# Patient Record
Sex: MALE | Race: WHITE | NOT HISPANIC OR LATINO | URBAN - METROPOLITAN AREA
[De-identification: names, ages, dates, MRNs, and addresses within clinical notes are randomized per-mention and may not be internally consistent; named-entity substitution may affect disease eponyms.]

---

## 2021-12-06 ENCOUNTER — EMERGENCY (EMERGENCY)
Facility: HOSPITAL | Age: 66
LOS: 1 days | Discharge: HOME | End: 2021-12-06
Attending: EMERGENCY MEDICINE
Payer: COMMERCIAL

## 2021-12-06 VITALS
OXYGEN SATURATION: 95 % | HEART RATE: 98 BPM | HEIGHT: 74 IN | SYSTOLIC BLOOD PRESSURE: 184 MMHG | RESPIRATION RATE: 16 BRPM | TEMPERATURE: 100 F | WEIGHT: 240.08 LBS | DIASTOLIC BLOOD PRESSURE: 102 MMHG

## 2021-12-06 PROCEDURE — 73140 X-RAY EXAM OF FINGER(S): CPT

## 2021-12-06 PROCEDURE — 12001 RPR S/N/AX/GEN/TRNK 2.5CM/<: CPT

## 2021-12-06 PROCEDURE — 73140 X-RAY EXAM OF FINGER(S): CPT | Mod: 26,LT

## 2021-12-06 PROCEDURE — 99283 EMERGENCY DEPT VISIT LOW MDM: CPT | Mod: 25

## 2021-12-06 NOTE — ED STATDOCS - PATIENT PORTAL LINK FT
You can access the FollowMyHealth Patient Portal offered by [facility] by registering at the following website: [email or web address]. By joining [email or web address]’s FollowMyHealth portal, you will also be able to view your health information using other applications (apps) compatible with our system.

## 2021-12-06 NOTE — ED ADULT TRIAGE NOTE - NS ED NURSE BANDS TYPE
-s/p CABG  -NSTEMI c/b V Fib arrest s/p ROSC after 20mins of CPR.  -Continue Amiodarone  -cont medical management as above Name band;

## 2021-12-06 NOTE — ED STATDOCS - OBJECTIVE STATEMENT
crush injury to left 5th finger resulting in laceration earlier today.  RHD.  last tet <10 yrs.  Denies numbness of fingertip

## 2021-12-20 ENCOUNTER — EMERGENCY (EMERGENCY)
Facility: HOSPITAL | Age: 66
LOS: 1 days | Discharge: HOME | End: 2021-12-20
Attending: STUDENT IN AN ORGANIZED HEALTH CARE EDUCATION/TRAINING PROGRAM
Payer: COMMERCIAL

## 2021-12-20 VITALS
WEIGHT: 240.08 LBS | HEIGHT: 74 IN | TEMPERATURE: 99 F | OXYGEN SATURATION: 97 % | HEART RATE: 85 BPM | RESPIRATION RATE: 18 BRPM | DIASTOLIC BLOOD PRESSURE: 94 MMHG | SYSTOLIC BLOOD PRESSURE: 186 MMHG

## 2021-12-20 PROCEDURE — 99053 MED SERV 10PM-8AM 24 HR FAC: CPT

## 2021-12-20 PROCEDURE — 99281 EMR DPT VST MAYX REQ PHY/QHP: CPT

## 2021-12-20 PROCEDURE — 99282 EMERGENCY DEPT VISIT SF MDM: CPT

## 2021-12-20 NOTE — ED PROVIDER NOTE - PHYSICAL EXAMINATION
Const: Awake, alert and oriented. In no acute distress. Well appearing.  HEENT: NC/AT. Moist mucous membranes.  Eyes: No scleral icterus. EOMI.  Neck:. Soft and supple. Full ROM without pain.  Cardiac: +S1/S2. No murmurs. Peripheral pulses 2+ and symmetric. No LE edema.  Resp: Speaking in full sentences. No evidence of respiratory distress. No wheezes, rales or rhonchi.  Abd: Soft, non-tender, non-distended. Normal bowel sounds in all 4 quadrants. No guarding or rebound.  Back: Spine midline and non-tender. No CVAT.  MSK: FROM in all extremities neurovasculary intact radial pulse 2+ hand  5/5   Skin: Wound noted to left 5th finger 2. 5 cm - 7 sutures in place, C/D/I, no erythema or sign of infection no drainage   Lymph: No cervical lymphadenopathy.  Neuro: Awake, alert & oriented x 3. Moves all extremities symmetrically.

## 2021-12-20 NOTE — ED PROVIDER NOTE - PATIENT PORTAL LINK FT
You can access the FollowMyHealth Patient Portal offered by [facility] by registering at the following website: [email or web address]. By joining [facility]’s FollowMyHealth portal, you will also be able to view your health information using other applications (apps) compatible with our system.

## 2021-12-20 NOTE — ED PROVIDER NOTE - ATTENDING CONTRIBUTION TO CARE
67 yo male presents for suture removal. Wound appears to be healing well. I personally saw the patient with the PA, and completed the key components of the history and physical exam. I then discussed the management plan with the PA.

## 2021-12-20 NOTE — ED PROVIDER NOTE - OBJECTIVE STATEMENT
pt is a 65 y/o male presenting to the ed for suture removal. pt stating had laceration to his left 5th finger - sutured at [facility] on december 6th. pt states wound is healing well, no pus drainage. pt denies fevers. pt denies cp sob abd pain back pain numbness or loss of sensation

## 2024-11-24 ENCOUNTER — INPATIENT (INPATIENT)
Facility: HOSPITAL | Age: 69
LOS: 15 days | Discharge: NURSING, REHAB OR LONG-TERM CARE | DRG: 282 | End: 2024-12-10
Attending: STUDENT IN AN ORGANIZED HEALTH CARE EDUCATION/TRAINING PROGRAM | Admitting: STUDENT IN AN ORGANIZED HEALTH CARE EDUCATION/TRAINING PROGRAM
Payer: COMMERCIAL

## 2024-11-24 VITALS
DIASTOLIC BLOOD PRESSURE: 83 MMHG | SYSTOLIC BLOOD PRESSURE: 137 MMHG | HEART RATE: 73 BPM | OXYGEN SATURATION: 96 % | RESPIRATION RATE: 18 BRPM | WEIGHT: 164.91 LBS | TEMPERATURE: 98 F | HEIGHT: 65 IN

## 2024-11-24 DIAGNOSIS — Z98.890 OTHER SPECIFIED POSTPROCEDURAL STATES: Chronic | ICD-10-CM

## 2024-11-24 DIAGNOSIS — R79.89 OTHER SPECIFIED ABNORMAL FINDINGS OF BLOOD CHEMISTRY: ICD-10-CM

## 2024-11-24 DIAGNOSIS — I21.4 NON-ST ELEVATION (NSTEMI) MYOCARDIAL INFARCTION: ICD-10-CM

## 2024-11-24 LAB
ALBUMIN SERPL ELPH-MCNC: 3.4 G/DL — SIGNIFICANT CHANGE UP (ref 3.3–5.2)
ALP SERPL-CCNC: 86 U/L — SIGNIFICANT CHANGE UP (ref 40–120)
ALT FLD-CCNC: 27 U/L — SIGNIFICANT CHANGE UP
ANION GAP SERPL CALC-SCNC: 11 MMOL/L — SIGNIFICANT CHANGE UP (ref 5–17)
APPEARANCE UR: ABNORMAL
APTT BLD: 28 SEC — SIGNIFICANT CHANGE UP (ref 24.5–35.6)
AST SERPL-CCNC: 26 U/L — SIGNIFICANT CHANGE UP
BACTERIA # UR AUTO: NEGATIVE /HPF — SIGNIFICANT CHANGE UP
BASOPHILS # BLD AUTO: 0.04 K/UL — SIGNIFICANT CHANGE UP (ref 0–0.2)
BASOPHILS NFR BLD AUTO: 0.5 % — SIGNIFICANT CHANGE UP (ref 0–2)
BILIRUB SERPL-MCNC: 0.7 MG/DL — SIGNIFICANT CHANGE UP (ref 0.4–2)
BILIRUB UR-MCNC: NEGATIVE — SIGNIFICANT CHANGE UP
BUN SERPL-MCNC: 17.1 MG/DL — SIGNIFICANT CHANGE UP (ref 8–20)
CALCIUM SERPL-MCNC: 8.9 MG/DL — SIGNIFICANT CHANGE UP (ref 8.4–10.5)
CAST: 2 /LPF — SIGNIFICANT CHANGE UP (ref 0–4)
CHLORIDE SERPL-SCNC: 103 MMOL/L — SIGNIFICANT CHANGE UP (ref 96–108)
CO2 SERPL-SCNC: 26 MMOL/L — SIGNIFICANT CHANGE UP (ref 22–29)
COLOR SPEC: YELLOW — SIGNIFICANT CHANGE UP
CREAT SERPL-MCNC: 0.61 MG/DL — SIGNIFICANT CHANGE UP (ref 0.5–1.3)
DIFF PNL FLD: ABNORMAL
EGFR: 105 ML/MIN/1.73M2 — SIGNIFICANT CHANGE UP
EOSINOPHIL # BLD AUTO: 0.06 K/UL — SIGNIFICANT CHANGE UP (ref 0–0.5)
EOSINOPHIL NFR BLD AUTO: 0.7 % — SIGNIFICANT CHANGE UP (ref 0–6)
GLUCOSE SERPL-MCNC: 121 MG/DL — HIGH (ref 70–99)
GLUCOSE UR QL: NEGATIVE MG/DL — SIGNIFICANT CHANGE UP
HCT VFR BLD CALC: 36 % — LOW (ref 39–50)
HGB BLD-MCNC: 12.1 G/DL — LOW (ref 13–17)
IMM GRANULOCYTES NFR BLD AUTO: 0.6 % — SIGNIFICANT CHANGE UP (ref 0–0.9)
INR BLD: 1.07 RATIO — SIGNIFICANT CHANGE UP (ref 0.85–1.16)
KETONES UR-MCNC: NEGATIVE MG/DL — SIGNIFICANT CHANGE UP
LEUKOCYTE ESTERASE UR-ACNC: NEGATIVE — SIGNIFICANT CHANGE UP
LYMPHOCYTES # BLD AUTO: 1.3 K/UL — SIGNIFICANT CHANGE UP (ref 1–3.3)
LYMPHOCYTES # BLD AUTO: 14.7 % — SIGNIFICANT CHANGE UP (ref 13–44)
MCHC RBC-ENTMCNC: 30 PG — SIGNIFICANT CHANGE UP (ref 27–34)
MCHC RBC-ENTMCNC: 33.6 G/DL — SIGNIFICANT CHANGE UP (ref 32–36)
MCV RBC AUTO: 89.3 FL — SIGNIFICANT CHANGE UP (ref 80–100)
MONOCYTES # BLD AUTO: 0.7 K/UL — SIGNIFICANT CHANGE UP (ref 0–0.9)
MONOCYTES NFR BLD AUTO: 7.9 % — SIGNIFICANT CHANGE UP (ref 2–14)
NEUTROPHILS # BLD AUTO: 6.67 K/UL — SIGNIFICANT CHANGE UP (ref 1.8–7.4)
NEUTROPHILS NFR BLD AUTO: 75.6 % — SIGNIFICANT CHANGE UP (ref 43–77)
NITRITE UR-MCNC: NEGATIVE — SIGNIFICANT CHANGE UP
PH UR: 7.5 — SIGNIFICANT CHANGE UP (ref 5–8)
PLATELET # BLD AUTO: 307 K/UL — SIGNIFICANT CHANGE UP (ref 150–400)
POTASSIUM SERPL-MCNC: 3.3 MMOL/L — LOW (ref 3.5–5.3)
POTASSIUM SERPL-SCNC: 3.3 MMOL/L — LOW (ref 3.5–5.3)
PROT SERPL-MCNC: 6.1 G/DL — LOW (ref 6.6–8.7)
PROT UR-MCNC: SIGNIFICANT CHANGE UP MG/DL
PROTHROM AB SERPL-ACNC: 12.1 SEC — SIGNIFICANT CHANGE UP (ref 9.9–13.4)
RBC # BLD: 4.03 M/UL — LOW (ref 4.2–5.8)
RBC # FLD: 13.4 % — SIGNIFICANT CHANGE UP (ref 10.3–14.5)
RBC CASTS # UR COMP ASSIST: 9 /HPF — HIGH (ref 0–4)
SODIUM SERPL-SCNC: 140 MMOL/L — SIGNIFICANT CHANGE UP (ref 135–145)
SP GR SPEC: >1.03 — HIGH (ref 1–1.03)
SQUAMOUS # UR AUTO: 0 /HPF — SIGNIFICANT CHANGE UP (ref 0–5)
TROPONIN T, HIGH SENSITIVITY RESULT: 51 NG/L — SIGNIFICANT CHANGE UP (ref 0–51)
TROPONIN T, HIGH SENSITIVITY RESULT: 53 NG/L — HIGH (ref 0–51)
TROPONIN T, HIGH SENSITIVITY RESULT: 56 NG/L — HIGH (ref 0–51)
UROBILINOGEN FLD QL: 1 MG/DL — SIGNIFICANT CHANGE UP (ref 0.2–1)
WBC # BLD: 8.82 K/UL — SIGNIFICANT CHANGE UP (ref 3.8–10.5)
WBC # FLD AUTO: 8.82 K/UL — SIGNIFICANT CHANGE UP (ref 3.8–10.5)
WBC UR QL: 7 /HPF — HIGH (ref 0–5)

## 2024-11-24 PROCEDURE — 99285 EMERGENCY DEPT VISIT HI MDM: CPT

## 2024-11-24 PROCEDURE — 70486 CT MAXILLOFACIAL W/O DYE: CPT | Mod: 26,MC

## 2024-11-24 PROCEDURE — 70498 CT ANGIOGRAPHY NECK: CPT | Mod: 26,MC

## 2024-11-24 PROCEDURE — 99223 1ST HOSP IP/OBS HIGH 75: CPT

## 2024-11-24 PROCEDURE — 70496 CT ANGIOGRAPHY HEAD: CPT | Mod: 26,MC

## 2024-11-24 PROCEDURE — 70450 CT HEAD/BRAIN W/O DYE: CPT | Mod: 26,59,MC

## 2024-11-24 PROCEDURE — 99283 EMERGENCY DEPT VISIT LOW MDM: CPT

## 2024-11-24 PROCEDURE — 72125 CT NECK SPINE W/O DYE: CPT | Mod: 26,MC

## 2024-11-24 PROCEDURE — 71045 X-RAY EXAM CHEST 1 VIEW: CPT | Mod: 26

## 2024-11-24 RX ORDER — ACETAMINOPHEN 500MG 500 MG/1
650 TABLET, COATED ORAL EVERY 6 HOURS
Refills: 0 | Status: DISCONTINUED | OUTPATIENT
Start: 2024-11-24 | End: 2024-12-10

## 2024-11-24 RX ORDER — POTASSIUM CHLORIDE 600 MG/1
10 TABLET, EXTENDED RELEASE ORAL
Refills: 0 | Status: COMPLETED | OUTPATIENT
Start: 2024-11-24 | End: 2024-11-24

## 2024-11-24 RX ORDER — TETANUS TOXOID, REDUCED DIPHTHERIA TOXOID AND ACELLULAR PERTUSSIS VACCINE, ADSORBED 5; 2.5; 8; 8; 2.5 [IU]/.5ML; [IU]/.5ML; UG/.5ML; UG/.5ML; UG/.5ML
0.5 SUSPENSION INTRAMUSCULAR ONCE
Refills: 0 | Status: COMPLETED | OUTPATIENT
Start: 2024-11-24 | End: 2024-11-24

## 2024-11-24 RX ORDER — OLANZAPINE 20 MG/1
5 TABLET ORAL EVERY 6 HOURS
Refills: 0 | Status: DISCONTINUED | OUTPATIENT
Start: 2024-11-24 | End: 2024-12-10

## 2024-11-24 RX ORDER — OLANZAPINE 20 MG/1
5 TABLET ORAL ONCE
Refills: 0 | Status: COMPLETED | OUTPATIENT
Start: 2024-11-24 | End: 2024-11-24

## 2024-11-24 RX ORDER — MAGNESIUM, ALUMINUM HYDROXIDE 200-225/5
30 SUSPENSION, ORAL (FINAL DOSE FORM) ORAL EVERY 4 HOURS
Refills: 0 | Status: DISCONTINUED | OUTPATIENT
Start: 2024-11-24 | End: 2024-12-10

## 2024-11-24 RX ORDER — KETOROLAC TROMETHAMINE 30 MG/ML
30 INJECTION INTRAMUSCULAR; INTRAVENOUS ONCE
Refills: 0 | Status: DISCONTINUED | OUTPATIENT
Start: 2024-11-24 | End: 2024-11-24

## 2024-11-24 RX ORDER — SODIUM CHLORIDE 9 MG/ML
1000 INJECTION, SOLUTION INTRAMUSCULAR; INTRAVENOUS; SUBCUTANEOUS
Refills: 0 | Status: DISCONTINUED | OUTPATIENT
Start: 2024-11-24 | End: 2024-11-29

## 2024-11-24 RX ORDER — ACETAMINOPHEN, DIPHENHYDRAMINE HCL, PHENYLEPHRINE HCL 325; 25; 5 MG/1; MG/1; MG/1
3 TABLET ORAL AT BEDTIME
Refills: 0 | Status: DISCONTINUED | OUTPATIENT
Start: 2024-11-24 | End: 2024-12-10

## 2024-11-24 RX ORDER — ACETAMINOPHEN 500MG 500 MG/1
1000 TABLET, COATED ORAL ONCE
Refills: 0 | Status: COMPLETED | OUTPATIENT
Start: 2024-11-24 | End: 2024-11-24

## 2024-11-24 RX ORDER — ONDANSETRON HYDROCHLORIDE 4 MG/1
4 TABLET, FILM COATED ORAL EVERY 8 HOURS
Refills: 0 | Status: DISCONTINUED | OUTPATIENT
Start: 2024-11-24 | End: 2024-12-10

## 2024-11-24 RX ADMIN — KETOROLAC TROMETHAMINE 30 MILLIGRAM(S): 30 INJECTION INTRAMUSCULAR; INTRAVENOUS at 15:32

## 2024-11-24 RX ADMIN — OLANZAPINE 5 MILLIGRAM(S): 20 TABLET ORAL at 20:41

## 2024-11-24 RX ADMIN — TETANUS TOXOID, REDUCED DIPHTHERIA TOXOID AND ACELLULAR PERTUSSIS VACCINE, ADSORBED 0.5 MILLILITER(S): 5; 2.5; 8; 8; 2.5 SUSPENSION INTRAMUSCULAR at 15:14

## 2024-11-24 RX ADMIN — KETOROLAC TROMETHAMINE 30 MILLIGRAM(S): 30 INJECTION INTRAMUSCULAR; INTRAVENOUS at 15:45

## 2024-11-24 RX ADMIN — Medication 2 SPRAY(S): at 16:11

## 2024-11-24 RX ADMIN — ACETAMINOPHEN 500MG 1000 MILLIGRAM(S): 500 TABLET, COATED ORAL at 15:43

## 2024-11-24 RX ADMIN — POTASSIUM CHLORIDE 100 MILLIEQUIVALENT(S): 600 TABLET, EXTENDED RELEASE ORAL at 23:18

## 2024-11-24 RX ADMIN — ACETAMINOPHEN 500MG 400 MILLIGRAM(S): 500 TABLET, COATED ORAL at 15:13

## 2024-11-24 RX ADMIN — POTASSIUM CHLORIDE 100 MILLIEQUIVALENT(S): 600 TABLET, EXTENDED RELEASE ORAL at 20:02

## 2024-11-24 RX ADMIN — POTASSIUM CHLORIDE 100 MILLIEQUIVALENT(S): 600 TABLET, EXTENDED RELEASE ORAL at 18:15

## 2024-11-24 RX ADMIN — OLANZAPINE 5 MILLIGRAM(S): 20 TABLET ORAL at 17:50

## 2024-11-24 RX ADMIN — SODIUM CHLORIDE 70 MILLILITER(S): 9 INJECTION, SOLUTION INTRAMUSCULAR; INTRAVENOUS; SUBCUTANEOUS at 18:15

## 2024-11-24 NOTE — H&P ADULT - NSHPLABSRESULTS_GEN_ALL_CORE
12.1   8.82  )-----------( 307      ( 24 Nov 2024 09:18 )             36.0         11-24    140  |  103  |  17.1  ----------------------------<  121[H]  3.3[L]   |  26.0  |  0.61    Ca    8.9      24 Nov 2024 09:18    TPro  6.1[L]  /  Alb  3.4  /  TBili  0.7  /  DBili  x   /  AST  26  /  ALT  27  /  AlkPhos  86  11-24        < from: CT Angio Neck w/ IV Cont (11.24.24 @ 09:39) >    IMPRESSION:  HEAD CT: No evidence of an acute traumatic intracranial injury.  CERVICAL SPINE CT:  No evidence of an acute cervical spine fracture.   Large anterior bridging osteophytes. 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 Left-sided thyroid nodules measuring up to 2.2 cm. Correlate   with nonemergent ultrasonography.  FACIAL CT: Acute nondisplaced bilateral nasal bone fractures. Soft tissue   swelling involves the nose and cartilaginous nasal septum.  NECK CTA: No hemodynamic significant narowing within the neck.  BRAIN CTA: No proximal large vessel occlusion. 2 overlapping stents   within the mid and distal basilar artery which appears to treat a 1.9 x   2.4 x 2.0 cm aneurysm. Tracefilling of the aneurysm towards the upper   aspect of the stent. Correlate with history and prior outside imaging   findings.    --- End of Report ---    < end of copied text >

## 2024-11-24 NOTE — ED PROVIDER NOTE - OBJECTIVE STATEMENT
69-year-old male, history of recent stroke, delirium, significant cerebral dysfunction; presenting for fall and change in mental status.  Patient is nonverbal, but discussed with facility staff at [facility] and stable.  Over the last 24 hours, patient has become increasingly agitated, threw feces at 1 point.  Multiple attempts to get out of wheelchair and bed without proper supervision.  Patient is normally nonverbal and with significant resistance to care at baseline.  However was more agitated last night.  Received a as needed dose of Ativan, but proceeded to fall and strike his head earlier this morning.  Patient arrives to the ED unable to contribute to history.  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  Staff at outpatient facility also note that patient was started on Zosyn 24 hours ago for an unclear leukocytosis.

## 2024-11-24 NOTE — CONSULT NOTE ADULT - TIME BILLING
History, vitals, exam, meds, labs, cardiac images, ECG, and tele were reviewed.  Patient was agreeable and appreciative of the care provided.

## 2024-11-24 NOTE — H&P ADULT - HISTORY OF PRESENT ILLNESS
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  Pt is a poor historian but as per family he has had worsening decine in mentation since stroke and significantly agitated and impulsive the past few days.  He was restless trying to get out of wheelchair yesterday and fell landing on his face/head this morning and was subsequently sent to hospital for evaluation.

## 2024-11-24 NOTE — CONSULT NOTE ADULT - SUBJECTIVE AND OBJECTIVE BOX
[facility]                                              CARDIOLOGY AT [facility]                                                   [address], [place] [address]                                             Telephone: [phone]. Fax:[phone]                                                       CARDIOLOGY CONSULTATION NOTE                                                                                             History obtained by: Patient and medical record  Community Cardiologist:  Ross Germain (doctor unknown)   obtained: Yes [  ] No [x  ]  Reason for Consultation:    Elevated trops  Available out pt records reviewed: Yes [x  ] No [  ]    Chief complaint:    Patient is a 69y old  Male who presents with a chief complaint of s/p fall    HPI:  69-yo, M/  PMH:  Hypertension, R eye implant (not MR compatible), recent hospital admission at [facility] (6/14-7/17/24) for stroke, 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    Patient is nonverbal, but discussed that over the last 24 hours, patient has become increasingly agitated, threw feces at 1 point.  Multiple attempts to get out of wheelchair and bed without proper supervision.    Patient is normally nonverbal and with significant resistance to care at baseline.    However was more agitated last night.  Received a as needed dose of Ativan, but proceeded to fall and strike his head earlier this morning. .    A cardiac consult was placed due to elevated troponins.    Patient complaining of jaw pain.  EKG has TWI in leads v2 - v6.    Denies dizziness palpitations, sob, pnd, orthopnea, n/v/d/c/abd pain or any other pain.        CARDIAC TESTING   ECHO:  7/21 Echo - EF 75%, normal LVSF/RVSF, mild MR, trace TR  STRESS:    CATH:     ELECTROPHYSIOLOGY:     PAST MEDICAL HISTORY      PAST SURGICAL HISTORY      SOCIAL HISTORY:  Denies smoking/alcohol/drugs  CIGARETTES:     ALCOHOL:  DRUGS:    FAMILY HISTORY:    Family History of Cardiovascular Disease:  Yes [  ] No [  ]  Coronary Artery Disease in first degree relative: Yes [  ] No [  ]  Sudden Cardiac Death in First degree relative: Yes [  ] No [  ]    CURRENT OTHER MEDICATIONS:  acetaminophen   IVPB .. 1000 milliGRAM(s) IV Intermittent Once, Stop order after: 1 Doses  ketorolac   Injectable 30 milliGRAM(s) IV Push Once, Stop order after: 1 Doses  OLANZapine Injectable 5 milliGRAM(s) IntraMuscular Once, Stop order after: 1 Doses  diphtheria/tetanus/pertussis (acellular) Vaccine (Adacel) 0.5 milliLiter(s) IntraMuscular once, Stop order after: 1 Doses  oxymetazoline 0.05% Nasal Spray 2 Spray(s) Alternating Nostrils Once, Stop order after: 1 Doses    ALLERGIES:   No Known Allergies    REVIEW OF SYMPTOMS:   CONSTITUTIONAL: No fever, no chills, no weight loss, no weight gain, no fatigue   ENMT:  No vertigo; No sinus or throat pain  NECK: No pain or stiffness  CARDIOVASCULAR: No chest pain, no dyspnea, no syncope/presyncope, no palpitations, no dizziness, no Orthopnea, no Paroxsymal nocturnal dyspnea  RESPIRATORY: no Shortness of breath, no cough, no wheezing  : No dysuria, no hematuria   GI: No dark color stool, no nausea, no diarrhea, no constipation, no abdominal pain   NEURO: No headache, no slurred speech   MUSCULOSKELETAL: No joint pain or swelling; No muscle, back, or extremity pain  PSYCH: No agitation, no anxiety.    ALL OTHER REVIEW OF SYSTEMS ARE NEGATIVE.    VITAL SIGNS:  T(C): 36.6 (11-24-24 @ 09:06), Max: 36.6 (11-24-24 @ 09:06)  T(F): 97.8 (11-24-24 @ 09:06), Max: 97.8 (11-24-24 @ 09:06)  HR: 73 (11-24-24 @ 09:06) (73 - 73)  BP: 137/83 (11-24-24 @ 09:06) (137/83 - 137/83)  RR: 18 (11-24-24 @ 09:06) (18 - 18)  SpO2: 96% (11-24-24 @ 09:06) (96% - 96%)    INTAKE AND OUTPUT:  PHYSICAL EXAM:  Constitutional: Comfortable. No acute distress.   Makes needs known with nodding head.    HEENT: Facial bruising and normocephalic , neck is supple . no JVD. No carotid bruit.  CNS:   Awake and follows commands, nonverbal, paralyzed on the right.    Respiratory: CTAB, unlabored   Cardiovascular: RRR normal s1 s2. No murmur. No rubs or gallop.  Gastrointestinal: Soft, non-tender. +Bowel sounds.   Extremities:+ Peripheral Pulses, No clubbing, cyanosis, or edema  Psychiatric: Calm . no agitation.   Skin: Warm and dry, no ulcers on extremities     LABS:                         12.1   8.82  )-----------( 307      ( 24 Nov 2024 09:18 )             36.0     11-24    140  |  103  |  17.1  ----------------------------<  121[H]  3.3[L]   |  26.0  |  0.61    Ca    8.9      24 Nov 2024 09:18    TPro  6.1[L]  /  Alb  3.4  /  TBili  0.7  /  DBili  x   /  AST  26  /  ALT  27  /  AlkPhos  86  11-24    PT/INR - ( 24 Nov 2024 09:18 )   PT: 12.1 sec;   INR: 1.07 ratio         PTT - ( 24 Nov 2024 09:18 )  PTT:28.0 sec  Urinalysis Basic - ( 24 Nov 2024 09:18 )    Color: x / Appearance: x / SG: x / pH: x  Gluc: 121 mg/dL / Ketone: x  / Bili: x / Urobili: x   Blood: x / Protein: x / Nitrite: x   Leuk Esterase: x / RBC: x / WBC x   Sq Epi: x / Non Sq Epi: x / Bacteria: x    INTERPRETATION OF TELEMETRY:   Sr/SB TWI v2-v5    ECG:   Ventricular Rate 57 BPM  Atrial Rate 57 BPM  P-R Interval 182 ms  QRS Duration 100 ms  Q-T Interval 448 ms  QTC Calculation(Bazett) 436 ms  P Axis 55 degrees  R Axs 25 degrees  T Axis 120 degrees  Diagnosis Line Sinus bradycardia with marked sinus arrhythmia  ST & T wave abnormality, consider anterolateral ischemia  Abnormal ECG    Prior ECG: Yes [  ] No [x  ]                                                  [facility]                                              CARDIOLOGY AT [facility]                                                   [address], [place] [address]                                             Telephone: [phone]. Fax:[phone]                                                       CARDIOLOGY CONSULTATION NOTE                                                                                             History obtained by: Patient and medical record  Community Cardiologist:  Ross Germain (doctor unknown)   obtained: Yes [  ] No [x  ]  Reason for Consultation:    Elevated trops  Available out pt records reviewed: Yes [x  ] No [  ]    Chief complaint:    Patient is a 69y old  Male who presents with a chief complaint of s/p fall    HPI:  69-yo, M/  PMH:  Hypertension, R eye implant (not MR compatible), recent hospital admission at [facility] (6/14-7/17/24) for stroke, 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    Patient is nonverbal, but discussed that over the last 24 hours, patient has become increasingly agitated, threw feces at 1 point.  Multiple attempts to get out of wheelchair and bed without proper supervision.    Patient is normally nonverbal and with significant resistance to care at baseline.    However was more agitated last night.  Received a as needed dose of Ativan, but proceeded to fall and strike his head earlier this morning. .    A cardiac consult was placed due to elevated troponins.    Patient complaining of jaw pain.  EKG has TWI in leads v2 - v6.    Denies dizziness palpitations, sob, pnd, orthopnea, n/v/d/c/abd pain or any other pain.      CARDIAC TESTING   ECHO:  7/21 Echo - EF 75%, normal LVSF/RVSF, mild MR, trace TR  STRESS:    CATH:     ELECTROPHYSIOLOGY:     PAST MEDICAL HISTORY      PAST SURGICAL HISTORY      SOCIAL HISTORY:  Denies smoking/alcohol/drugs  CIGARETTES:     ALCOHOL:  DRUGS:    FAMILY HISTORY:    Family History of Cardiovascular Disease:  Yes [  ] No [  ]  Coronary Artery Disease in first degree relative: Yes [  ] No [  ]  Sudden Cardiac Death in First degree relative: Yes [  ] No [  ]    CURRENT OTHER MEDICATIONS:  acetaminophen   IVPB .. 1000 milliGRAM(s) IV Intermittent Once, Stop order after: 1 Doses  ketorolac   Injectable 30 milliGRAM(s) IV Push Once, Stop order after: 1 Doses  OLANZapine Injectable 5 milliGRAM(s) IntraMuscular Once, Stop order after: 1 Doses  diphtheria/tetanus/pertussis (acellular) Vaccine (Adacel) 0.5 milliLiter(s) IntraMuscular once, Stop order after: 1 Doses  oxymetazoline 0.05% Nasal Spray 2 Spray(s) Alternating Nostrils Once, Stop order after: 1 Doses    ALLERGIES:   No Known Allergies    REVIEW OF SYMPTOMS:   CONSTITUTIONAL: No fever, no chills, no weight loss, no weight gain, no fatigue   ENMT:  No vertigo; No sinus or throat pain  NECK: No pain or stiffness  CARDIOVASCULAR: No chest pain, no dyspnea, no syncope/presyncope, no palpitations, no dizziness, no Orthopnea, no Paroxsymal nocturnal dyspnea  RESPIRATORY: no Shortness of breath, no cough, no wheezing  : No dysuria, no hematuria   GI: No dark color stool, no nausea, no diarrhea, no constipation, no abdominal pain   NEURO: No headache, no slurred speech   MUSCULOSKELETAL: No joint pain or swelling; No muscle, back, or extremity pain  PSYCH: No agitation, no anxiety.    ALL OTHER REVIEW OF SYSTEMS ARE NEGATIVE.    VITAL SIGNS:  T(C): 36.6 (11-24-24 @ 09:06), Max: 36.6 (11-24-24 @ 09:06)  T(F): 97.8 (11-24-24 @ 09:06), Max: 97.8 (11-24-24 @ 09:06)  HR: 73 (11-24-24 @ 09:06) (73 - 73)  BP: 137/83 (11-24-24 @ 09:06) (137/83 - 137/83)  RR: 18 (11-24-24 @ 09:06) (18 - 18)  SpO2: 96% (11-24-24 @ 09:06) (96% - 96%)    INTAKE AND OUTPUT:  PHYSICAL EXAM:  Constitutional: Comfortable. No acute distress.   Makes needs known with nodding head.    HEENT: Facial bruising and normocephalic , neck is supple . no JVD. No carotid bruit.  CNS:   Awake and follows commands, nonverbal, paralyzed on the right.    Respiratory: CTAB, unlabored   Cardiovascular: RRR normal s1 s2. Grade II/VI systolic murmur. No rubs or gallop.  Gastrointestinal: Soft, non-tender. +Bowel sounds.   Extremities:+ Peripheral Pulses, No clubbing, cyanosis, or edema  Psychiatric: Calm . no agitation.   Skin: Warm and dry, no ulcers on extremities     LABS:                         12.1   8.82  )-----------( 307      ( 24 Nov 2024 09:18 )             36.0     11-24    140  |  103  |  17.1  ----------------------------<  121[H]  3.3[L]   |  26.0  |  0.61    Ca    8.9      24 Nov 2024 09:18    TPro  6.1[L]  /  Alb  3.4  /  TBili  0.7  /  DBili  x   /  AST  26  /  ALT  27  /  AlkPhos  86  11-24    PT/INR - ( 24 Nov 2024 09:18 )   PT: 12.1 sec;   INR: 1.07 ratio         PTT - ( 24 Nov 2024 09:18 )  PTT:28.0 sec  Urinalysis Basic - ( 24 Nov 2024 09:18 )    Color: x / Appearance: x / SG: x / pH: x  Gluc: 121 mg/dL / Ketone: x  / Bili: x / Urobili: x   Blood: x / Protein: x / Nitrite: x   Leuk Esterase: x / RBC: x / WBC x   Sq Epi: x / Non Sq Epi: x / Bacteria: x    INTERPRETATION OF TELEMETRY:   Sr/SB TWI v2-v5    ECG:   Ventricular Rate 57 BPM  Atrial Rate 57 BPM  P-R Interval 182 ms  QRS Duration 100 ms  Q-T Interval 448 ms  QTC Calculation(Bazett) 436 ms  P Axis 55 degrees  R Axs 25 degrees  T Axis 120 degrees  Diagnosis Line Sinus bradycardia with marked sinus arrhythmia  ST & T wave abnormality, consider anterolateral ischemia  Abnormal ECG    Prior ECG: Yes [  ] No [x  ]

## 2024-11-24 NOTE — ED ADULT NURSE NOTE - NSFALLRISKINTERV_ED_ALL_ED
Assistance OOB with selected safe patient handling equipment if applicable/Assistance with ambulation/Communicate fall risk and risk factors to all staff, patient, and family/Monitor gait and stability/Monitor for mental status changes and reorient to person, place, and time, as needed/Move patient closer to nursing station/within visual sight of ED staff/Provide visual cue: yellow wristband, yellow gown, etc/Reinforce activity limits and safety measures with patient and family/Toileting schedule using arm’s reach rule for commode and bathroom/Use of alarms - bed, stretcher, chair and/or video monitoring/Call bell, personal items and telephone in reach/Instruct patient to call for assistance before getting out of bed/chair/stretcher/Non-slip footwear applied when patient is off stretcher/[place] to call system/Physically safe environment - no spills, clutter or unnecessary equipment/Purposeful Proactive Rounding/Room/bathroom lighting operational, light cord in reach

## 2024-11-24 NOTE — ED ADULT NURSE REASSESSMENT NOTE - NS ED NURSE REASSESS COMMENT FT1
Assumed care of pt at this time. Patient arousable to stimulation, no acute distress, resp nonlabored, resting comfortably in bed.  C collar removed by MD Barriga. Pt remains on continuos cardiac monitoring NSR HR 68,  97% RA. Pt refusing to answer RN questions at this time. Safety maintained with bed locked and in lowest position.

## 2024-11-24 NOTE — PATIENT PROFILE ADULT - FUNCTIONAL ASSESSMENT - BASIC MOBILITY 6.
1-calculated by average/Not able to assess (calculate score using [facility] averaging method)

## 2024-11-24 NOTE — ED PROVIDER NOTE - SECONDARY DIAGNOSIS.
Primary Care - Behavioral Health Intake     Vania Boggs MD as PCP - General (Family Practice)    Referral Information:   Patient referred to [facility] by her primary care physician, Vania Boggs MD, on October 31, 2023 for concerns related to anxious mood, concentration issues, excessive over-eating/snacking, grief, motivation to things that would normally do, socialization and work/work schedule.    Contacted patient via phone to discuss the integration of behavioral health services into the primary care clinic and the provision of care coordination and/or short-term, brief interventions to improve overall health and functioning.     BHI program was explained and patient agreed.  Reason for decline: N/A    Current Outpatient Therapist/Psychiatry:  No    Behavioral Health Medication Review:  Current: Yes  History:  Yes  Side effects:  none at this time    Assessment:  Patient was normal and alert. Mood was normal and affect appropriate. Thought process appeared Appropriate and Normal.     PHQ 9 Scores Last four PHQ 2/9 Test Results  Last four PHQ 2/9 Test Results  0: Not at all  1: Several days  2: More than half the days  3: Nearly every day          11/1/2023     9:58 AM 10/30/2023     3:42 PM 8/17/2023     3:43 PM 6/5/2023     8:36 AM   PHQ 2 Score   Adult PHQ 2 Score 1 0 0 0   Adult PHQ 2 Interpretation No further screening needed No further screening needed No further screening needed No further screening needed   Little interest or pleasure in activity? 1 0 0 0         11/1/2023     9:58 AM 4/4/2022     2:34 PM 2/2/2021     9:48 PM   PHQ 9 Score   Adult PHQ 9 Score 8 0 0   Adult PHQ 9 Interpretation Mild Depression               CARLOTA 7 Scores Last four GAD7 Assessments           11/1/2023     1:00 PM 11/1/2023    12:00 PM 10/30/2023     3:30 PM 8/17/2023     3:30 PM   GAD7 Screening   GAD7 Score  15 16 10   Feeling nervous, anxious or on edge  Nearly every day Nearly every day Not at all   Not being able to stop  or control worrying  Nearly every day Nearly every day More than half the days   Worrying too much about different things  Nearly every day Nearly every day More than half the days   Trouble relaxing  More than half the days Nearly every day More than half the days   Being so restless that it's hard to sit still  Several days Several days More than half the days   Becoming easily annoyed or irritable  More than half the days Nearly every day More than half the days   Feeling afraid as if something awful might happen  Several days Not at all Not at all   Ability to handle work, home and other people  Very difficult Very difficult Very difficult   Date/time completed by patient via [email or web address] 11/1/2023 12:07 PM 11/1/2023  9:57 AM    11/1/2023  9:57 AM    11/1/2023  9:58 AM         SDOH Screen completed in Epic No    Resources given: Yes, crisis    Patient Goal (one thing patient would change):  Patient would like to gain tools to increase concentration. Gain coping skills, gain confidence and tools to manage anxiety.     Recommendations:   Patient was referred to the Integrated Behavioral Health Clinician, Johnathon Augustine LPC, for evaluation and treatment recommendations    Provided patient with contact information for this writer as well as the [facility] emergency after hours contact number [phone]. Patient was encouraged to contact Behavioral Health Integration team with any questions or concerns. Will continue collaboration with patient's primary care provider, Vania Boggs MD, regarding patient's care and treatment plan.   Nasal bone fracture Antibiotics received only prior to arrival

## 2024-11-24 NOTE — ED ADULT NURSE REASSESSMENT NOTE - NS ED NURSE REASSESS COMMENT FT1
Pt desat to 75%. MD Barriga made aware and at bedside. Pt placed on nonrebreather and O2 sat back up to 98%.

## 2024-11-24 NOTE — PATIENT PROFILE ADULT - FALL HARM RISK - HARM RISK INTERVENTIONS
Assistance with ambulation/Assistance OOB with selected safe patient handling equipment/Communicate Risk of Fall with Harm to all staff/Discuss with provider need for PT consult/Monitor for mental status changes/Monitor gait and stability/Move patient closer to nurses' station/Provide patient with walking aids - walker, cane, crutches/Reinforce activity limits and safety measures with patient and family/Reorient to person, place and time as needed/Tailored Fall Risk Interventions/Toileting schedule using arm’s reach rule for commode and bathroom/Use of alarms - bed, chair and/or voice tab/Visual Cue: Yellow wristband and red socks/Bed in lowest position, wheels locked, appropriate side rails in place/Call bell, personal items and telephone in reach/Instruct patient to call for assistance before getting out of bed or chair/Non-slip footwear when patient is out of bed/[place] to call system/Physically safe environment - no spills, clutter or unnecessary equipment/Purposeful Proactive Rounding/Room/bathroom lighting operational, light cord in reach

## 2024-11-24 NOTE — ED PROVIDER NOTE - CARE PLAN
Principal Discharge DX:	NSTEMI (non-ST elevation myocardial infarction)  Secondary Diagnosis:	Nasal bone fracture   1 Principal Discharge DX:	NSTEMI (non-ST elevation myocardial infarction)  Secondary Diagnosis:	Nasal bone fracture  Secondary Diagnosis:	Antibiotics received only prior to arrival

## 2024-11-24 NOTE — ED ADULT NURSE REASSESSMENT NOTE - NS ED NURSE REASSESS COMMENT FT1
Pt HR 51. Patient placed on 2L nasal cannula and EKG currently in progress. MD Barriga made aware and no further orders at this time.

## 2024-11-24 NOTE — PATIENT PROFILE ADULT - DO YOU EVER NEED HELP READING HOSPITAL MATERIALS?
Patient ID:   Connor Morgan no

## 2024-11-24 NOTE — H&P ADULT - ASSESSMENT
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  Pt reported by family to have continued decline in mentation since stroke but significantly agitated the past few days.  VS stable overall but had 1 short episode of desaturation reportedly in to 70's but quickly resolved and currenly satting >94% ORA.  Clinically appears dehydrated, is altered, restless and only intermittently follows simple commands but not answering questions appropriately.  Initial w/u significant for Hb 12.1, trops 56-->53, EKG w/ TWI in V2-V6.  CT maxillofacial significant for acute nondisplaced bilateral nasal bone fractures.  CTH and Cspine negative.  CTA neck and brain negative for acute findings.  s/p zyprexa for restlessness/agitaiton in ED.  Cardiology consulted.  Will admit for type II MI and mechanical fall.       Type II MI, possibley demand ischemia   - Trops 56-->53  - EKG has TWIs in V2 - V6  - Will order repeat EKG to assess for dynamic changes  - TTE ordered to assess LVfxn, valves and WMA  - Check lipid panel and A1c   - Per  first pt is on ASA and brillinta likely from recent intracranial stent placement but on hold awaiting repeat CTH is done to ensure no ICH and pending SLP clearance for diet   - Monitor on telemetry   - Cardiology consulted       Mechanical fall w/ subsequent nasal bone fractures  - Pt reported to be agitated at facility and fell out of bed   - CTH and C-spine negative   - CTA neck w/ no hemodynamic significant narrowing within the neck  - CTA brain w/ no large vessel occlusion but noted to have 2 overlapping stents within the mid and distal basilar artery which appears to treat a 1.9 x 2.4 x 2.0 cm aneurysm.  Trace filling of the aneurysm towards the upper aspect of the stent noted.   - CT maxillofacial significant for acute nondisplaced bilateral nasal bone fractures   - Family reports pt is on blood thinners therefore will order 2nd CTH to ensure no ICH   - Out pt follow up w/ oral facial maxially surgery but no acute interventions required   - Analgesics as needed   - Fall and aspiration precautions       Worsening mentation w/ behavioral disturbances  - Suspect part of decline in mental status related to recent stroke/aneurysm repair  - Per family pts mental status has been declining over the past few weeks but significantly worse recently   - No evidence of acute infection and UA negative for UTI   - Pt s/p fall w/ head trauma and initial CTH negative for ICH   - Stat repeat CTH ordered as pt is on blood thinners and requires 2 CTHs at least 6hrs appart to ensure no ICH   - CXR w/out consolidation/infiltrate   - Dehydrated on exam which can contribute to delirium therefore will give gentle IVFs   - Concern for dysphagia given mental status  - Will make NPO, place on IVFs and consult SLP for evaluation   - Once cleared by speech for diet resume home meds  - Fall and aspiration precautions   - PRN IM zyprexa if needed for severe agitation but reorient when possible and can consider starting depakote IV as well if remains very agitated   - Would avoid benzo's if possible   - Given concern for impulsivity and fall risk will place on 1:1 for now but would benefit from camera room   - Once cleared for diet resume home sertraline  -  consulted for further recs       Hypokalemia  - Will supplement  - Maintain K>4      Mild normocytic anemia  - Baseline H/H unknown   - Reported to be on 3 blood thinners as per family but awaiting med rec from [facility] to be fax'ed over to confirm (per  first pt is on ASA and brillinta)  - Dry blood in and around nares noted   - Hemodynamically stable   - Will repeat CBC in AM to reassess  - Transfuse for Hb<8      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  Trace filling of the aneurysm towards the upper aspect of the stent noted.   - Can not have MRI as per family bc pt has Rt eye implant that is not MRI compatible  - CXR negative  - As per family pt is on 3 blood thinners but do not know which ones or doses (per Dr. balbuena pt is on ASA and brillinta)  [facility]         VTE ppx:  SCDs pending repeat CTH to ensure no ICH.      Dispo: Acute.  Anticipate will need MAURICIO on d/c and will need PT consult but awaiting repeat CTH prior to ordering consult.

## 2024-11-24 NOTE — CONSULT NOTE ADULT - NS ATTEND AMEND GEN_ALL_CORE FT
68 yo, M PMH basilar artery aneurysm with angio and thrombolysis at [facility] in June, had a second stroke also treated at [facility], htn, right hemiparesis, presents today for fall out of wheel chair and broke his nose.  ?Severe AS    Outside records 7/21 Echo LVEF 75%, normal LVSF/RVSF, mild MR, trace TR. [facility] 9/2022, mild to moderate nonobstructive CAD 50% ostial circumflex stenosis, 45% pLAD, serve AS, mildly elevated capillary wedge pressure.     A cardiac consult placed due to elevated troponin levels 53,58    -Ct trend trops.  -Repeat TTE to assess aortic valve and LVEF. We will follow up on the results.  -Ct tele.

## 2024-11-24 NOTE — CONSULT NOTE ADULT - ASSESSMENT
69-yo, M/  PMH:  Hypertension, R eye implant (not MR compatible), recent hospital admission at [facility] (6/14-7/17/24) for stroke, 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    Patient is nonverbal, but discussed that over the last 24 hours, patient has become increasingly agitated, threw feces at 1 point.  Multiple attempts to get out of wheelchair and bed without proper supervision.    Patient is normally nonverbal and with significant resistance to care at baseline.    However was more agitated last night.  Received a as needed dose of Ativan, but proceeded to fall and strike his head earlier this morning. .    A cardiac consult was placed due to elevated troponins.    Patient complaining of jaw pain.  EKG has TWI in leads v2 - v6.    Denies dizziness palpitations, sob, pnd, orthopnea, n/v/d/c/abd pain or any other pain.

## 2024-11-24 NOTE — ED ADULT NURSE NOTE - OBJECTIVE STATEMENT
Pt A&Ox4 presenting to the ED s/p fall at nursing home. PMH stroke in June, brain aneurysm, HTN. Patient is nonverbal at baseline. As per family, pt has gotten increasingly more agitated and confused over the past few days. As per family, pt was sitting in a chair and slid out at [facility]. Pt +use of blood thinners and +head strike. Patient remains on continuos cardiac monitoring NSR HR 70,  97% RA.

## 2024-11-24 NOTE — CONSULT NOTE ADULT - PROBLEM SELECTOR RECOMMENDATION 9
68 yo, M PMH basilar artery aneurysm with angio and thrombolysis at [facility] in June, had a second stroke also treated at [facility], htn, right hemiparesis, presents today for fall out of wheel chair and broke his nose.      A cardiac consult placed due to elevated troponins 53,58  Ct trend trops  7/21 Echo - EF 75%, normal LVSF/RVSF, mild MR, trace TR  [facility] September 2022, mild to moderate nonobstructive CAD 50% ostial circumflex stenosis, 45% p LAD,  AS, mildly elevated capillary wedge pressure.  Repeat TTE  Ct telemetry and pulse monitoring.

## 2024-11-24 NOTE — ED ADULT TRIAGE NOTE - CHIEF COMPLAINT QUOTE
pt BIBA from NH s/p fall. EMS reports + head stirke, no LOC. + Ac use. pt alerted in ED, lac noted to nose, bleeding controlled, MD Linus at bedside for eval, priority CT

## 2024-11-24 NOTE — ED PROVIDER NOTE - ENMT, MLM
right eye chronic pupilary deformity; tenderness and swelling to nasal bridge with abrasion, but no charlene laceration; dried blood in nares, but no septal hematoma

## 2024-11-24 NOTE — H&P ADULT - NSHPPHYSICALEXAM_GEN_ALL_CORE
GENERAL: pt examined bedside, restless and uncomfortable appearing  HEENT: NC/AT, dry oral mucosa, clear conjunctiva, sclera nonicteric  RESPIRATORY: Normal respiratory effort, no wheezing, rhonchi, rales  CARDIOVASCULAR: RRR, normal S1 and S2, no murmur/rub/gallop  ABDOMEN: soft, NT/ND, + bowel sounds, no rebound/guarding  MSK: No joint deformities, edema, erythema  EXTREMITIES: No cynaosis, no clubbing, no lower extremity edema  PSYCH: agitated, restless, impulsive, intermittently cooperative but overall not following commands well   NEUROLOGY: Awake but unable to assess if oriented, pt nonverbal, Rt sided paralysis, moving LUE and LLE spontaneously, strength difficult to assess as only intermittently following simple commands  SKIN: poor turgor

## 2024-11-24 NOTE — ED PROVIDER NOTE - CLINICAL SUMMARY MEDICAL DECISION MAKING FREE TEXT BOX
elevated troponin; admit for tele, serial trops, echo;    consult for ongoing agitation in setting of chronic brain injury  d/w hospitalist for admission  pending straight cath for urine.

## 2024-11-25 ENCOUNTER — RESULT REVIEW (OUTPATIENT)
Age: 69
End: 2024-11-25

## 2024-11-25 LAB
A1C WITH ESTIMATED AVERAGE GLUCOSE RESULT: 5.9 % — HIGH (ref 4–5.6)
ALBUMIN SERPL ELPH-MCNC: 3.4 G/DL — SIGNIFICANT CHANGE UP (ref 3.3–5.2)
ALP SERPL-CCNC: 91 U/L — SIGNIFICANT CHANGE UP (ref 40–120)
ALT FLD-CCNC: 23 U/L — SIGNIFICANT CHANGE UP
ANION GAP SERPL CALC-SCNC: 12 MMOL/L — SIGNIFICANT CHANGE UP (ref 5–17)
AST SERPL-CCNC: 19 U/L — SIGNIFICANT CHANGE UP
BASOPHILS # BLD AUTO: 0.05 K/UL — SIGNIFICANT CHANGE UP (ref 0–0.2)
BASOPHILS NFR BLD AUTO: 0.5 % — SIGNIFICANT CHANGE UP (ref 0–2)
BILIRUB SERPL-MCNC: 0.8 MG/DL — SIGNIFICANT CHANGE UP (ref 0.4–2)
BUN SERPL-MCNC: 12.5 MG/DL — SIGNIFICANT CHANGE UP (ref 8–20)
CALCIUM SERPL-MCNC: 9.1 MG/DL — SIGNIFICANT CHANGE UP (ref 8.4–10.5)
CHLORIDE SERPL-SCNC: 106 MMOL/L — SIGNIFICANT CHANGE UP (ref 96–108)
CHOLEST SERPL-MCNC: 88 MG/DL — SIGNIFICANT CHANGE UP
CO2 SERPL-SCNC: 23 MMOL/L — SIGNIFICANT CHANGE UP (ref 22–29)
CREAT SERPL-MCNC: 0.45 MG/DL — LOW (ref 0.5–1.3)
EGFR: 115 ML/MIN/1.73M2 — SIGNIFICANT CHANGE UP
EOSINOPHIL # BLD AUTO: 0.07 K/UL — SIGNIFICANT CHANGE UP (ref 0–0.5)
EOSINOPHIL NFR BLD AUTO: 0.7 % — SIGNIFICANT CHANGE UP (ref 0–6)
ESTIMATED AVERAGE GLUCOSE: 123 MG/DL — HIGH (ref 68–114)
GLUCOSE SERPL-MCNC: 80 MG/DL — SIGNIFICANT CHANGE UP (ref 70–99)
HCT VFR BLD CALC: 37.8 % — LOW (ref 39–50)
HDLC SERPL-MCNC: 34 MG/DL — LOW
HGB BLD-MCNC: 12.3 G/DL — LOW (ref 13–17)
IMM GRANULOCYTES NFR BLD AUTO: 0.3 % — SIGNIFICANT CHANGE UP (ref 0–0.9)
LIPID PNL WITH DIRECT LDL SERPL: 35 MG/DL — SIGNIFICANT CHANGE UP
LYMPHOCYTES # BLD AUTO: 1.7 K/UL — SIGNIFICANT CHANGE UP (ref 1–3.3)
LYMPHOCYTES # BLD AUTO: 17.8 % — SIGNIFICANT CHANGE UP (ref 13–44)
MAGNESIUM SERPL-MCNC: 2.1 MG/DL — SIGNIFICANT CHANGE UP (ref 1.6–2.6)
MCHC RBC-ENTMCNC: 30.2 PG — SIGNIFICANT CHANGE UP (ref 27–34)
MCHC RBC-ENTMCNC: 32.5 G/DL — SIGNIFICANT CHANGE UP (ref 32–36)
MCV RBC AUTO: 92.9 FL — SIGNIFICANT CHANGE UP (ref 80–100)
MONOCYTES # BLD AUTO: 0.78 K/UL — SIGNIFICANT CHANGE UP (ref 0–0.9)
MONOCYTES NFR BLD AUTO: 8.2 % — SIGNIFICANT CHANGE UP (ref 2–14)
NEUTROPHILS # BLD AUTO: 6.91 K/UL — SIGNIFICANT CHANGE UP (ref 1.8–7.4)
NEUTROPHILS NFR BLD AUTO: 72.5 % — SIGNIFICANT CHANGE UP (ref 43–77)
NON HDL CHOLESTEROL: 54 MG/DL — SIGNIFICANT CHANGE UP
PHOSPHATE SERPL-MCNC: 3 MG/DL — SIGNIFICANT CHANGE UP (ref 2.4–4.7)
PLATELET # BLD AUTO: 301 K/UL — SIGNIFICANT CHANGE UP (ref 150–400)
POTASSIUM SERPL-MCNC: 3.5 MMOL/L — SIGNIFICANT CHANGE UP (ref 3.5–5.3)
POTASSIUM SERPL-SCNC: 3.5 MMOL/L — SIGNIFICANT CHANGE UP (ref 3.5–5.3)
PROT SERPL-MCNC: 6.2 G/DL — LOW (ref 6.6–8.7)
RBC # BLD: 4.07 M/UL — LOW (ref 4.2–5.8)
RBC # FLD: 13.2 % — SIGNIFICANT CHANGE UP (ref 10.3–14.5)
SODIUM SERPL-SCNC: 141 MMOL/L — SIGNIFICANT CHANGE UP (ref 135–145)
TRIGL SERPL-MCNC: 93 MG/DL — SIGNIFICANT CHANGE UP
WBC # BLD: 9.54 K/UL — SIGNIFICANT CHANGE UP (ref 3.8–10.5)
WBC # FLD AUTO: 9.54 K/UL — SIGNIFICANT CHANGE UP (ref 3.8–10.5)

## 2024-11-25 PROCEDURE — 93306 TTE W/DOPPLER COMPLETE: CPT | Mod: 26

## 2024-11-25 PROCEDURE — 99232 SBSQ HOSP IP/OBS MODERATE 35: CPT | Mod: 25

## 2024-11-25 PROCEDURE — 70450 CT HEAD/BRAIN W/O DYE: CPT | Mod: 26

## 2024-11-25 PROCEDURE — 99233 SBSQ HOSP IP/OBS HIGH 50: CPT

## 2024-11-25 RX ORDER — TICAGRELOR 90 MG/1
90 TABLET ORAL EVERY 12 HOURS
Refills: 0 | Status: DISCONTINUED | OUTPATIENT
Start: 2024-11-25 | End: 2024-12-10

## 2024-11-25 RX ORDER — AMLODIPINE BESYLATE 10 MG/1
5 TABLET ORAL DAILY
Refills: 0 | Status: DISCONTINUED | OUTPATIENT
Start: 2024-11-25 | End: 2024-11-30

## 2024-11-25 RX ORDER — OLANZAPINE 20 MG/1
2.5 TABLET ORAL ONCE
Refills: 0 | Status: COMPLETED | OUTPATIENT
Start: 2024-11-25 | End: 2024-11-25

## 2024-11-25 RX ORDER — PANTOPRAZOLE SODIUM 40 MG/1
40 TABLET, DELAYED RELEASE ORAL
Refills: 0 | Status: DISCONTINUED | OUTPATIENT
Start: 2024-11-25 | End: 2024-12-10

## 2024-11-25 RX ORDER — OXYCODONE HYDROCHLORIDE 30 MG/1
5 TABLET ORAL
Refills: 0 | Status: DISCONTINUED | OUTPATIENT
Start: 2024-11-25 | End: 2024-11-28

## 2024-11-25 RX ORDER — FAMOTIDINE 20 MG/1
20 TABLET, FILM COATED ORAL
Refills: 0 | Status: DISCONTINUED | OUTPATIENT
Start: 2024-11-25 | End: 2024-12-10

## 2024-11-25 RX ORDER — BACLOFEN 100 %
5 POWDER (GRAM) MISCELLANEOUS EVERY 12 HOURS
Refills: 0 | Status: DISCONTINUED | OUTPATIENT
Start: 2024-11-25 | End: 2024-12-10

## 2024-11-25 RX ORDER — LOSARTAN POTASSIUM 100 MG/1
25 TABLET, FILM COATED ORAL DAILY
Refills: 0 | Status: DISCONTINUED | OUTPATIENT
Start: 2024-11-25 | End: 2024-12-10

## 2024-11-25 RX ORDER — METOPROLOL TARTRATE 100 MG/1
25 TABLET, FILM COATED ORAL DAILY
Refills: 0 | Status: DISCONTINUED | OUTPATIENT
Start: 2024-11-25 | End: 2024-12-10

## 2024-11-25 RX ORDER — SERTRALINE HYDROCHLORIDE 100 MG/1
50 TABLET, FILM COATED ORAL DAILY
Refills: 0 | Status: DISCONTINUED | OUTPATIENT
Start: 2024-11-25 | End: 2024-12-10

## 2024-11-25 RX ORDER — AMLODIPINE BESYLATE 10 MG/1
1 TABLET ORAL
Refills: 0 | DISCHARGE

## 2024-11-25 RX ADMIN — AMLODIPINE BESYLATE 5 MILLIGRAM(S): 10 TABLET ORAL at 13:37

## 2024-11-25 RX ADMIN — METOPROLOL TARTRATE 25 MILLIGRAM(S): 100 TABLET, FILM COATED ORAL at 13:42

## 2024-11-25 RX ADMIN — SERTRALINE HYDROCHLORIDE 50 MILLIGRAM(S): 100 TABLET, FILM COATED ORAL at 13:37

## 2024-11-25 RX ADMIN — TICAGRELOR 90 MILLIGRAM(S): 90 TABLET ORAL at 18:48

## 2024-11-25 RX ADMIN — OXYCODONE HYDROCHLORIDE 5 MILLIGRAM(S): 30 TABLET ORAL at 14:13

## 2024-11-25 RX ADMIN — ACETAMINOPHEN 500MG 650 MILLIGRAM(S): 500 TABLET, COATED ORAL at 21:28

## 2024-11-25 RX ADMIN — OXYCODONE HYDROCHLORIDE 5 MILLIGRAM(S): 30 TABLET ORAL at 13:43

## 2024-11-25 RX ADMIN — ACETAMINOPHEN 500MG 650 MILLIGRAM(S): 500 TABLET, COATED ORAL at 21:58

## 2024-11-25 RX ADMIN — OLANZAPINE 2.5 MILLIGRAM(S): 20 TABLET ORAL at 00:22

## 2024-11-25 RX ADMIN — Medication 5 MILLIGRAM(S): at 18:49

## 2024-11-25 RX ADMIN — FAMOTIDINE 20 MILLIGRAM(S): 20 TABLET, FILM COATED ORAL at 23:05

## 2024-11-25 RX ADMIN — Medication 81 MILLIGRAM(S): at 13:37

## 2024-11-25 RX ADMIN — Medication 40 MILLIGRAM(S): at 21:29

## 2024-11-25 NOTE — SWALLOW BEDSIDE ASSESSMENT ADULT - ORAL PHASE
piecemeal deglutition suspected/Decreased anterior-posterior movement of the bolus/Delayed oral transit time trace lingual stasis/Delayed oral transit time

## 2024-11-25 NOTE — PROGRESS NOTE ADULT - SUBJECTIVE AND OBJECTIVE BOX
[facility]                                                         CARDIOLOGY AT [facility]                                                                  [address], [place] [address]                                                         Telephone: [phone]. Fax:[phone]                                                                             PROGRESS NOTE    Reason for follow up:   Elevated trops  Update:   Resting comfortably arousalable to verbal stimuli.      Review of symptoms:   Cardiac:  No chest pain. No dyspnea. No palpitations.  Respiratory: no cough. No dyspnea  Gastrointestinal: No diarrhea. No abdominal pain. No bleeding.   Neuro: No focal neuro complaints.    Vitals:  T(C): 36.7 (11-25-24 @ 08:09), Max: 37.2 (11-24-24 @ 20:37)  HR: 92 (11-25-24 @ 08:09) (75 - 92)  BP: 167/97 (11-25-24 @ 08:09) (160/78 - 184/113)  RR: 19 (11-25-24 @ 08:09) (19 - 21)  SpO2: 100% (11-25-24 @ 08:09) (95% - 100%)  I&O's Summary    Weight (kg): 74.8 (11-24 @ 09:06)    PHYSICAL EXAM:  Appearance: Comfortable. No acute distress  HEENT:  Atraumatic. Normocephalic.  Normal oral mucosa  Neurologic: A & O x 1, responsive to tactile and verbal stimuli.  Right weakness noted.    Cardiovascular: RRR S1 S2, No murmur, no rubs/gallops. No JVD  Respiratory: Lungs clear to auscultation, unlabored   Gastrointestinal:  Soft, Non-tender, + BS  Lower Extremities: + Peripheral Pulses, No clubbing, cyanosis, or edema  Psychiatry: Patient is calm. No agitation.   Skin: warm and dry.    CURRENT OTHER MEDICATIONS:  acetaminophen     Tablet .. 650 milliGRAM(s) Oral every 6 hours PRN Temp greater or equal to 38C (100.4F), Mild Pain (1 - 3)  melatonin 3 milliGRAM(s) Oral at bedtime PRN Insomnia  OLANZapine Injectable 5 milliGRAM(s) IntraMuscular every 6 hours PRN agitation  ondansetron Injectable 4 milliGRAM(s) IV Push every 8 hours PRN Nausea and/or Vomiting  aluminum hydroxide/magnesium hydroxide/simethicone Suspension 30 milliLiter(s) Oral every 4 hours PRN Dyspepsia  sodium chloride 0.9%. 1000 milliLiter(s) (70 mL/Hr) IV Continuous <Continuous>, Stop order after: 24 Hours    LABS:	 	                          12.3   9.54  )-----------( 301      ( 25 Nov 2024 06:33 )             37.8     11-25    141  |  106  |  12.5  ----------------------------<  80  3.5   |  23.0  |  0.45[L]    Ca    9.1      25 Nov 2024 06:33  Phos  3.0     11-25  Mg     2.1     11-25    TPro  6.2[L]  /  Alb  3.4  /  TBili  0.8  /  DBili  x   /  AST  19  /  ALT  23  /  AlkPhos  91  11-25    PT/INR/PTT ( 24 Nov 2024 09:18 )                       :                       :      12.1         :       28.0                  .        .                   .              .           .       1.07        .                                       Lipid Profile: Date: 11-25 @ 06:33  Total cholesterol 88; Direct LDL: --; HDL: 34; Triglycerides:93    TELEMETRY:   Sr 80's no acute alarms noted

## 2024-11-25 NOTE — PROGRESS NOTE ADULT - ASSESSMENT
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  Pt reported by family to have continued decline in mentation since stroke but significantly agitated the past few days.  VS stable overall but had 1 short episode of desaturation reportedly in to 70's but quickly resolved and currenly satting >94% ORA.  Clinically appears dehydrated, is altered, restless and only intermittently follows simple commands but not answering questions appropriately.  Initial w/u significant for Hb 12.1, trops 56-->53, EKG w/ TWI in V2-V6.  CT maxillofacial significant for acute nondisplaced bilateral nasal bone fractures.  CTH and Cspine negative.  CTA neck and brain negative for acute findings.  s/p zyprexa for restlessness/agitaiton in ED.  Cardiology consulted.  Will admit for type II MI and mechanical fall.       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  - Suspect part of decline in mental status related to recent stroke/aneurysm repair  - Per family pts mental status has been declining over the past few weeks but significantly worse recently   - No evidence of acute infection and UA negative for UTI   - Pt s/p fall w/ head trauma and initial CTH negative for ICH   - CXR w/out consolidation/infiltrate   - Dehydrated on exam which can contribute to delirium therefore will give gentle IVFs   - Concern for dysphagia given mental status  - puree diet  - Fall and aspiration precautions   - PRN IM zyprexa if needed for severe agitation but reorient when possible and can consider starting depakote IV as well if remains very agitated   - Would avoid benzo's if possible   - adhere to strict hospital delirium precautions, escalate admission to room  - Once cleared for diet resume home sertraline  -  consultation      Hypokalemia  - resolved      Mild normocytic anemia  - Baseline H/H unknown   - Reported to be on 3 blood thinners as per family but awaiting med rec from [place] to be fax'ed over to confirm (per Dr. 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  Trace filling of the aneurysm towards the upper aspect of the stent noted.   - Can not have MRI as per family bc pt has Rt eye implant that is not MRI compatible  - CXR negative  - restarted DAPT  - home meds restarted        VTE ppx:  SCDs pending repeat CTH to ensure no ICH.      Dispo: Medically acute, needs 3-4 days prior to DC. pending PT, likely needs MAURICIO

## 2024-11-25 NOTE — PROGRESS NOTE ADULT - SUBJECTIVE AND OBJECTIVE BOX
Hospitalist Progress Note    Chief Complaint:  AMS    SUBJECTIVE / OVERNIGHT EVENTS:  No events overnight, patient seen at bedside, in NAD, unable to obtain ROS due to mental status.    MEDICATIONS  (STANDING):  amLODIPine   Tablet 5 milliGRAM(s) Oral daily  aspirin  chewable 81 milliGRAM(s) Oral daily  atorvastatin 40 milliGRAM(s) Oral at bedtime  baclofen 5 milliGRAM(s) Oral every 12 hours  famotidine    Tablet 20 milliGRAM(s) Oral two times a day  losartan 25 milliGRAM(s) Oral daily  metoprolol succinate ER 25 milliGRAM(s) Oral daily  pantoprazole    Tablet 40 milliGRAM(s) Oral before breakfast  sertraline 50 milliGRAM(s) Oral daily  sodium chloride 0.9%. 1000 milliLiter(s) (70 mL/Hr) IV Continuous <Continuous>  ticagrelor 90 milliGRAM(s) Oral every 12 hours    MEDICATIONS  (PRN):  acetaminophen     Tablet .. 650 milliGRAM(s) Oral every 6 hours PRN Temp greater or equal to 38C (100.4F), Mild Pain (1 - 3)  aluminum hydroxide/magnesium hydroxide/simethicone Suspension 30 milliLiter(s) Oral every 4 hours PRN Dyspepsia  melatonin 3 milliGRAM(s) Oral at bedtime PRN Insomnia  OLANZapine Injectable 5 milliGRAM(s) IntraMuscular every 6 hours PRN agitation  ondansetron Injectable 4 milliGRAM(s) IV Push every 8 hours PRN Nausea and/or Vomiting  oxyCODONE    IR 5 milliGRAM(s) Oral four times a day PRN for severe pain        I&O's Summary      PHYSICAL EXAM:  Vital Signs Last 24 Hrs  T(C): 36.3 (25 Nov 2024 11:44), Max: 37.2 (24 Nov 2024 20:37)  T(F): 97.4 (25 Nov 2024 11:44), Max: 99 (24 Nov 2024 20:37)  HR: 88 (25 Nov 2024 11:44) (75 - 92)  BP: 172/85 (25 Nov 2024 11:44) (160/78 - 184/113)  BP(mean): --  RR: 18 (25 Nov 2024 11:44) (18 - 21)  SpO2: 98% (25 Nov 2024 11:44) (95% - 100%)    Parameters below as of 25 Nov 2024 11:44  Patient On (Oxygen Delivery Method): room air            GENERAL: pt examined bedside, restless and uncomfortable appearing  HEENT: NC/AT, dry oral mucosa, clear conjunctiva, sclera nonicteric  RESPIRATORY: Normal respiratory effort, no wheezing, rhonchi, rales  CARDIOVASCULAR: RRR, normal S1 and S2, no murmur/rub/gallop  ABDOMEN: soft, NT/ND, + bowel sounds, no rebound/guarding  MSK: No joint deformities, edema, erythema  EXTREMITIES: No cynaosis, no clubbing, no lower extremity edema  PSYCH: agitated, restless, impulsive, intermittently cooperative but overall not following commands well   NEUROLOGY: Awake but unable to assess if oriented, pt nonverbal, Rt sided paralysis, moving LUE and LLE spontaneously, strength difficult to assess as only intermittently following simple commands  SKIN: poor turgor    LABS:                        12.3   9.54  )-----------( 301      ( 25 Nov 2024 06:33 )             37.8     11-25    141  |  106  |  12.5  ----------------------------<  80  3.5   |  23.0  |  0.45[L]    Ca    9.1      25 Nov 2024 06:33  Phos  3.0     11-25  Mg     2.1     11-25    TPro  6.2[L]  /  Alb  3.4  /  TBili  0.8  /  DBili  x   /  AST  19  /  ALT  23  /  AlkPhos  91  11-25    PT/INR - ( 24 Nov 2024 09:18 )   PT: 12.1 sec;   INR: 1.07 ratio         PTT - ( 24 Nov 2024 09:18 )  PTT:28.0 sec      Urinalysis Basic - ( 25 Nov 2024 06:33 )    Color: x / Appearance: x / SG: x / pH: x  Gluc: 80 mg/dL / Ketone: x  / Bili: x / Urobili: x   Blood: x / Protein: x / Nitrite: x   Leuk Esterase: x / RBC: x / WBC x   Sq Epi: x / Non Sq Epi: x / Bacteria: x        CAPILLARY BLOOD GLUCOSE            RADIOLOGY & ADDITIONAL TESTS:  Results Reviewed: Y  Imaging Personally Reviewed: N  Electrocardiogram Personally Reviewed: ALBINO

## 2024-11-25 NOTE — PROGRESS NOTE ADULT - PROBLEM SELECTOR PLAN 1
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      A cardiac consult placed due to elevated troponin 53,58, 51   Ct trend trops  7/21 Echo - EF 75%, normal LVSF/RVSF, mild MR, trace TR  [facility] September 2022, mild to moderate nonobstructive CAD 50% ostial circumflex stenosis, 45% p LAD,  AS, mildly elevated capillary wedge pressure.  Repeat TTE pending  Ct telemetry and pulse monitoring.

## 2024-11-25 NOTE — SWALLOW BEDSIDE ASSESSMENT ADULT - COMMENTS
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  Pt reported by family to have continued decline in mentation since stroke but significantly agitated the past few days.  VS stable overall but had 1 short episode of desaturation reportedly in to 70's but quickly resolved and currenly satting >94% ORA.  Clinically appears dehydrated, is altered, restless and only intermittently follows simple commands but not answering questions appropriately.  Initial w/u significant for Hb 12.1, trops 56-->53, EKG w/ TWI in V2-V6.  CT maxillofacial significant for acute nondisplaced bilateral nasal bone fractures.  CTH and Cspine negative.  CTA neck and brain negative for acute findings.  s/p zyprexa for restlessness/agitaiton in ED.  Cardiology consulted.  Will admit for type II MI and mechanical fall."

## 2024-11-25 NOTE — SWALLOW BEDSIDE ASSESSMENT ADULT - SWALLOW EVAL: DIAGNOSIS
Mild to moderate oral dysphagia for assessed trials. Pharyngeal stage of swallow clinically unremarkable for puree. Pharyngeal dysphagia suspected for moderately thick fluids with +overt s/s aspiration noted post intake

## 2024-11-25 NOTE — SWALLOW BEDSIDE ASSESSMENT ADULT - SLP GENERAL OBSERVATIONS
Pt received & seen seated upright in bed, awake/alert, dysarthric with reduced speech intelligibility, decreased cognition, 0/10 pain pre/post

## 2024-11-26 LAB
ANION GAP SERPL CALC-SCNC: 13 MMOL/L — SIGNIFICANT CHANGE UP (ref 5–17)
BUN SERPL-MCNC: 10.2 MG/DL — SIGNIFICANT CHANGE UP (ref 8–20)
CALCIUM SERPL-MCNC: 8.9 MG/DL — SIGNIFICANT CHANGE UP (ref 8.4–10.5)
CHLORIDE SERPL-SCNC: 104 MMOL/L — SIGNIFICANT CHANGE UP (ref 96–108)
CO2 SERPL-SCNC: 25 MMOL/L — SIGNIFICANT CHANGE UP (ref 22–29)
CREAT SERPL-MCNC: 0.54 MG/DL — SIGNIFICANT CHANGE UP (ref 0.5–1.3)
CULTURE RESULTS: NO GROWTH — SIGNIFICANT CHANGE UP
EGFR: 109 ML/MIN/1.73M2 — SIGNIFICANT CHANGE UP
GLUCOSE SERPL-MCNC: 92 MG/DL — SIGNIFICANT CHANGE UP (ref 70–99)
HCT VFR BLD CALC: 37.1 % — LOW (ref 39–50)
HGB BLD-MCNC: 12.7 G/DL — LOW (ref 13–17)
LACTATE SERPL-SCNC: 1.3 MMOL/L — SIGNIFICANT CHANGE UP (ref 0.5–2)
MAGNESIUM SERPL-MCNC: 2 MG/DL — SIGNIFICANT CHANGE UP (ref 1.6–2.6)
MCHC RBC-ENTMCNC: 30.5 PG — SIGNIFICANT CHANGE UP (ref 27–34)
MCHC RBC-ENTMCNC: 34.2 G/DL — SIGNIFICANT CHANGE UP (ref 32–36)
MCV RBC AUTO: 89 FL — SIGNIFICANT CHANGE UP (ref 80–100)
PHOSPHATE SERPL-MCNC: 3.3 MG/DL — SIGNIFICANT CHANGE UP (ref 2.4–4.7)
PLATELET # BLD AUTO: 346 K/UL — SIGNIFICANT CHANGE UP (ref 150–400)
POTASSIUM SERPL-MCNC: 4.5 MMOL/L — SIGNIFICANT CHANGE UP (ref 3.5–5.3)
POTASSIUM SERPL-SCNC: 4.5 MMOL/L — SIGNIFICANT CHANGE UP (ref 3.5–5.3)
RBC # BLD: 4.17 M/UL — LOW (ref 4.2–5.8)
RBC # FLD: 13.4 % — SIGNIFICANT CHANGE UP (ref 10.3–14.5)
SODIUM SERPL-SCNC: 141 MMOL/L — SIGNIFICANT CHANGE UP (ref 135–145)
SPECIMEN SOURCE: SIGNIFICANT CHANGE UP
TROPONIN T, HIGH SENSITIVITY RESULT: 50 NG/L — SIGNIFICANT CHANGE UP (ref 0–51)
WBC # BLD: 8.29 K/UL — SIGNIFICANT CHANGE UP (ref 3.8–10.5)
WBC # FLD AUTO: 8.29 K/UL — SIGNIFICANT CHANGE UP (ref 3.8–10.5)

## 2024-11-26 PROCEDURE — 99232 SBSQ HOSP IP/OBS MODERATE 35: CPT | Mod: 25

## 2024-11-26 PROCEDURE — 99232 SBSQ HOSP IP/OBS MODERATE 35: CPT

## 2024-11-26 RX ORDER — MINERAL OIL
133 OIL (ML) ORAL ONCE
Refills: 0 | Status: COMPLETED | OUTPATIENT
Start: 2024-11-26 | End: 2024-11-26

## 2024-11-26 RX ORDER — OLANZAPINE 20 MG/1
2.5 TABLET ORAL ONCE
Refills: 0 | Status: COMPLETED | OUTPATIENT
Start: 2024-11-26 | End: 2024-11-26

## 2024-11-26 RX ADMIN — Medication 133 MILLILITER(S): at 16:37

## 2024-11-26 RX ADMIN — METOPROLOL TARTRATE 25 MILLIGRAM(S): 100 TABLET, FILM COATED ORAL at 06:44

## 2024-11-26 RX ADMIN — OXYCODONE HYDROCHLORIDE 5 MILLIGRAM(S): 30 TABLET ORAL at 02:03

## 2024-11-26 RX ADMIN — Medication 5 MILLIGRAM(S): at 06:45

## 2024-11-26 RX ADMIN — ACETAMINOPHEN 500MG 650 MILLIGRAM(S): 500 TABLET, COATED ORAL at 11:55

## 2024-11-26 RX ADMIN — SERTRALINE HYDROCHLORIDE 50 MILLIGRAM(S): 100 TABLET, FILM COATED ORAL at 11:56

## 2024-11-26 RX ADMIN — Medication 40 MILLIGRAM(S): at 21:33

## 2024-11-26 RX ADMIN — FAMOTIDINE 20 MILLIGRAM(S): 20 TABLET, FILM COATED ORAL at 17:21

## 2024-11-26 RX ADMIN — OXYCODONE HYDROCHLORIDE 5 MILLIGRAM(S): 30 TABLET ORAL at 03:00

## 2024-11-26 RX ADMIN — FAMOTIDINE 20 MILLIGRAM(S): 20 TABLET, FILM COATED ORAL at 06:44

## 2024-11-26 RX ADMIN — AMLODIPINE BESYLATE 5 MILLIGRAM(S): 10 TABLET ORAL at 06:44

## 2024-11-26 RX ADMIN — OLANZAPINE 5 MILLIGRAM(S): 20 TABLET ORAL at 02:03

## 2024-11-26 RX ADMIN — Medication 5 MILLIGRAM(S): at 17:20

## 2024-11-26 RX ADMIN — PANTOPRAZOLE SODIUM 40 MILLIGRAM(S): 40 TABLET, DELAYED RELEASE ORAL at 06:56

## 2024-11-26 RX ADMIN — TICAGRELOR 90 MILLIGRAM(S): 90 TABLET ORAL at 06:47

## 2024-11-26 RX ADMIN — ACETAMINOPHEN 500MG 650 MILLIGRAM(S): 500 TABLET, COATED ORAL at 12:55

## 2024-11-26 RX ADMIN — LOSARTAN POTASSIUM 25 MILLIGRAM(S): 100 TABLET, FILM COATED ORAL at 06:44

## 2024-11-26 RX ADMIN — TICAGRELOR 90 MILLIGRAM(S): 90 TABLET ORAL at 17:20

## 2024-11-26 RX ADMIN — Medication 81 MILLIGRAM(S): at 11:56

## 2024-11-26 NOTE — PROGRESS NOTE ADULT - SUBJECTIVE AND OBJECTIVE BOX
SUBJECTIVE  BRIEF HOSPITAL COURSE SUMMARY: Pt is a 69yMale admitted with chief complaint of     LAST 24 HOURS: Patient nodded in response to chest pain, later denied having chest pain. EKG ordered but not taken due to patient non compliance.   TODAY: Seen and examined bedside. Patient can speak in 2-3 word sentences. When asked where he is states he does not know. States he does not know where he is. Patient nodded when asked if he had chest pain, but later shook his head in response to the question. Patient unable to complete review of systems.     OBJECTIVE  PHYSICAL EXAM:  GENERAL: no acute distress, comfortably in bed  HEAD: NC/AT  EYES: PERRLA, EOMI, Non-icteric  ENT: Mucous membranes moist, neck supple  NEURO: No focal deficits, moving left extremities spontaneously, A&Ox0, slurred speach, 2-3 words at a time. Patient with deceits in right side with left facial droop.   PSYCH: Non fluent speech  RESP: CTAB, no wrr, non-labored breathing  CVS: RRR, no murmur appreciated  GI: Soft, non-tender, non-distended, no organomegaly, no appreciable masses, +bs all 4 quadrants  : No salmeron, no suprapubic tenderness  EXTREMITIES: Nontender, no clubbing, cyanosis, or edema  SKIN: No rashes or lesions   Vital Signs Last 24 Hrs  T(C): 36.9 (26 Nov 2024 09:32), Max: 36.9 (26 Nov 2024 09:32)  T(F): 98.4 (26 Nov 2024 09:32), Max: 98.4 (26 Nov 2024 09:32)  HR: 68 (26 Nov 2024 09:32) (57 - 83)  BP: 156/99 (26 Nov 2024 09:32) (137/66 - 156/99)  BP(mean): --  RR: 18 (26 Nov 2024 09:32) (17 - 18)  SpO2: 95% (26 Nov 2024 09:32) (95% - 99%)    Parameters below as of 26 Nov 2024 09:32  Patient On (Oxygen Delivery Method): room air            MEDICATIONS  (STANDING):  amLODIPine   Tablet 5 milliGRAM(s) Oral daily  aspirin  chewable 81 milliGRAM(s) Oral daily  atorvastatin 40 milliGRAM(s) Oral at bedtime  baclofen 5 milliGRAM(s) Oral every 12 hours  famotidine    Tablet 20 milliGRAM(s) Oral two times a day  losartan 25 milliGRAM(s) Oral daily  metoprolol succinate ER 25 milliGRAM(s) Oral daily  pantoprazole    Tablet 40 milliGRAM(s) Oral before breakfast  sertraline 50 milliGRAM(s) Oral daily  sodium chloride 0.9%. 1000 milliLiter(s) (70 mL/Hr) IV Continuous <Continuous>  ticagrelor 90 milliGRAM(s) Oral every 12 hours    MEDICATIONS  (PRN):  acetaminophen     Tablet .. 650 milliGRAM(s) Oral every 6 hours PRN Temp greater or equal to 38C (100.4F), Mild Pain (1 - 3)  aluminum hydroxide/magnesium hydroxide/simethicone Suspension 30 milliLiter(s) Oral every 4 hours PRN Dyspepsia  melatonin 3 milliGRAM(s) Oral at bedtime PRN Insomnia  OLANZapine Injectable 5 milliGRAM(s) IntraMuscular every 6 hours PRN agitation  ondansetron Injectable 4 milliGRAM(s) IV Push every 8 hours PRN Nausea and/or Vomiting  oxyCODONE    IR 5 milliGRAM(s) Oral four times a day PRN for severe pain    Allergies    No Known Allergies    Intolerances        LABS:                        12.7   8.29  )-----------( 346      ( 26 Nov 2024 08:49 )             37.1     11-26    141  |  104  |  10.2  ----------------------------<  92  4.5   |  25.0  |  0.54    Ca    8.9      26 Nov 2024 08:49  Phos  3.3     11-26  Mg     2.0     11-26    TPro  6.2[L]  /  Alb  3.4  /  TBili  0.8  /  DBili  x   /  AST  19  /  ALT  23  /  AlkPhos  91  11-25      Urinalysis Basic - ( 26 Nov 2024 08:49 )    Color: x / Appearance: x / SG: x / pH: x  Gluc: 92 mg/dL / Ketone: x  / Bili: x / Urobili: x   Blood: x / Protein: x / Nitrite: x   Leuk Esterase: x / RBC: x / WBC x   Sq Epi: x / Non Sq Epi: x / Bacteria: x      CAPILLARY BLOOD GLUCOSE          CULTURE DATA:    Culture - Urine (collected 11-24-24 @ 14:50)  Source: Clean Catch Clean Catch (Midstream)  Final Report (11-26-24 @ 08:02):    No growth        RADIOLOGY & ADDITIONAL TESTS:

## 2024-11-26 NOTE — PROGRESS NOTE ADULT - ATTENDING COMMENTS
69M with HTN, Rt eye implant, Recent CVA with right side paralysis presenting from [facility] with fall a, nasal fracture, t2 nstemi      T2 NSTEMI  No indication for LHC    Dysphagia   puree no liquid pending slp re-eval

## 2024-11-26 NOTE — PROGRESS NOTE ADULT - NS ATTEND AMEND GEN_ALL_CORE FT
Patient seen and examined by me.    Awaiting repeat TTE  I have discussed my recommendation with the PA which are outlined above.  Will follow.
Patient seen and examined by me.  Patients echo shows normal EF, No RWMA  No AS  Patients is not able to report any symtpoms because of his neuro event  Mental status- He is not able comprhend and understand.  Elevated trop is unlikely from NSTEMI  Patient is not a candidate for any stress given his poor mental condition and he is best served with continued medical therapy  Discussed with Dr Collins  I have discussed my recommendation with the PA which are outlined above.  Will sign off.

## 2024-11-26 NOTE — PROGRESS NOTE ADULT - ASSESSMENT
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  Pt reported by family to have continued decline in mentation since stroke but significantly agitated the past few days.  VS stable overall but had 1 short episode of desaturation reportedly in to 70's but quickly resolved and currenly satting >94% ORA.  Clinically appears dehydrated, is altered, restless and only intermittently follows simple commands but not answering questions appropriately.  Initial w/u significant for Hb 12.1, trops 56-->53, EKG w/ TWI in V2-V6.  CT maxillofacial significant for acute nondisplaced bilateral nasal bone fractures.  CTH and Cspine negative.  CTA neck and brain negative for acute findings.  s/p zyprexa for restlessness/agitaiton in ED.  Cardiology consulted.  Will admit for type II MI and mechanical fall.       Type II MI, possibley demand ischemia   - Trops 56-->53 --> 51 --> 50  - EKG shows changes in T wave in V2-V6 t wave inversion only seen in 1 complex on EKG not seen consistently. Possible PVC during QRS complex given appearance of T wave inversion. QRS complex in beats with T wave inversion look different than other QRS complexes in same lead.   - TTE shows EF of 54% with no wall motion abnormalities and grade 1 dystolic dysfunction. Previous Echo in 7.24 with EF of 74 %  - Check lipid panel and A1c   - restarted home cardiac meds   - Monitor on telemetry   - Cardiology on board - repeat TTE      Mechanical fall w/ subsequent nasal bone fractures  - CT reviewed  - PT  - Analgesics as needed   - Fall and aspiration precautions       Acute metabolic encephalopathy likely 2/2 Vascular Dementia  - Suspect part of decline in mental status related to recent stroke/aneurysm repair  - Per family pts mental status has been declining over the past few weeks but significantly worse recently   - No evidence of acute infection and UA negative for UTI   - Pt s/p fall w/ head trauma and initial CTH negative for ICH   - CXR w/out consolidation/infiltrate   - Dehydrated on exam which can contribute to delirium therefore will give gentle IVFs   - Concern for dysphagia given mental status  - puree diet  - Fall and aspiration precautions   - PRN IM zyprexa if needed for severe agitation but reorient when possible and can consider starting depakote IV as well if remains very agitated   - Would avoid benzo's if possible   - adhere to strict hospital delirium precautions, escalate admission to room  - Once cleared for diet resume home sertraline  -  consultation      Hypokalemia  - resolved      Mild normocytic anemia  - Baseline H/H unknown   - Reported to be on 3 blood thinners as per family but awaiting med rec from [place] to be fax'ed over to confirm (per  first pt is on ASA and brillinta)  - Dry blood in and around nares noted   - Hemodynamically stable   - stable  - Transfuse for Hb<8      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  Trace filling of the aneurysm towards the upper aspect of the stent noted.   - Can not have MRI as per family bc pt has Rt eye implant that is not MRI compatible  - CXR negative  - restarted DAPT  - home meds restarted        VTE ppx:  SCDs pending repeat CTH to ensure no ICH.      Dispo: Medically acute, needs 3-4 days prior to DC. 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  Pt reported by family to have continued decline in mentation since stroke but significantly agitated the past few days.  VS stable overall but had 1 short episode of desaturation reportedly in to 70's but quickly resolved and currenly satting >94% ORA.  Clinically appears dehydrated, is altered, restless and only intermittently follows simple commands but not answering questions appropriately.  Initial w/u significant for Hb 12.1, trops 56-->53, EKG w/ TWI in V2-V6.  CT maxillofacial significant for acute nondisplaced bilateral nasal bone fractures.  CTH and Cspine negative.  CTA neck and brain negative for acute findings.  s/p zyprexa for restlessness/agitaiton in ED.  Cardiology consulted.  Will admit for type II MI and mechanical fall.       Type II MI, possibley demand ischemia   - Trops 56-->53 --> 51 --> 50  - EKG shows V2-V6 t wave inversion   - TTE shows EF of 54% with no wall motion abnormalities and grade 1 dystolic dysfunction. Previous Echo in 7.24 with EF of 74 %  - Check lipid panel and A1c   - restarted home cardiac meds   - Monitor on telemetry   - Cardiology on board - repeat TTE      Mechanical fall w/ subsequent nasal bone fractures  - CT reviewed  - PT  - Analgesics as needed   - Fall and aspiration precautions       Acute metabolic encephalopathy likely 2/2 Vascular Dementia  - Suspect part of decline in mental status related to recent stroke/aneurysm repair  - Per family pts mental status has been declining over the past few weeks but significantly worse recently   - No evidence of acute infection and UA negative for UTI   - Pt s/p fall w/ head trauma and initial CTH negative for ICH   - CXR w/out consolidation/infiltrate   - Dehydrated on exam which can contribute to delirium therefore will give gentle IVFs   - Concern for dysphagia given mental status  - puree diet  - Fall and aspiration precautions   - PRN IM zyprexa if needed for severe agitation but reorient when possible and can consider starting depakote IV as well if remains very agitated   - Would avoid benzo's if possible   - adhere to strict hospital delirium precautions, escalate admission to room  - Once cleared for diet resume home sertraline  -  consultation      Hypokalemia  - resolved      Mild normocytic anemia  - Baseline H/H unknown   - Reported to be on 3 blood thinners as per family but awaiting med rec from [place] to be fax'ed over to confirm (per  first pt is on ASA and brillinta)  - Dry blood in and around nares noted   - Hemodynamically stable   - stable  - Transfuse for Hb<8      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  Trace filling of the aneurysm towards the upper aspect of the stent noted.   - Can not have MRI as per family bc pt has Rt eye implant that is not MRI compatible  - CXR negative  - restarted DAPT  - home meds restarted        VTE ppx:  SCDs pending repeat CTH to ensure no ICH.      Dispo: Medically acute, needs 3-4 days prior to DC. pending PT, likely needs [facility]

## 2024-11-26 NOTE — PROGRESS NOTE ADULT - PROBLEM SELECTOR PLAN 1
Cardiac consult placed due to elevated troponin 53,58, 51   Continue trend trops  7/21 Echo - EF 75%, normal LVSF/RVSF, mild MR, trace TR  [facility] September 2022, mild to moderate nonobstructive CAD 50% ostial circumflex stenosis, 45% p LAD,  AS, mildly elevated capillary wedge pressure  Repeat TTE with EF 54%, Grade I Diastolic dysfunction, no regional wall abnormalities  - Patient is not a candidate for stress testing or invasive procedure  - Patient is medically optimized  Ct telemetry and pulse monitoring    - Will sign off, please consult again if necessary

## 2024-11-26 NOTE — PROGRESS NOTE ADULT - SUBJECTIVE AND OBJECTIVE BOX
[facility]                                                         CARDIOLOGY AT [facility]                                                                  [address] [place], [place] [address]                                                         Telephone: [phone]. Fax:[phone]                                                                             PROGRESS NOTE    Reason for follow up:   Elevated trops  Update: Resting comfortably arousalable to verbal stimuli, patient non-verbal, slightly agitated this AM  Overnight Events: No acute overnight events  Last 24h Telemetry: No acute overnight events on telemetry    Review of symptoms:   Cardiac:  No chest pain. No dyspnea. No palpitations.  Respiratory: no cough. No dyspnea  Gastrointestinal: No diarrhea. No abdominal pain. No bleeding.   Neuro: No focal neuro complaints.    Vitals:  T(C): 36.9 (11-26-24 @ 09:32), Max: 36.9 (11-26-24 @ 09:32)  HR: 68 (11-26-24 @ 09:32) (57 - 83)  BP: 156/99 (11-26-24 @ 09:32) (137/66 - 156/99)  RR: 18 (11-26-24 @ 09:32) (17 - 18)  SpO2: 95% (11-26-24 @ 09:32) (95% - 99%)  Wt(kg): --  I&O's Summary    25 Nov 2024 07:01  -  26 Nov 2024 07:00  --------------------------------------------------------  IN: 0 mL / OUT: 700 mL / NET: -700 mL      Weight (kg): 74.8 (11-24 @ 09:06)    PHYSICAL EXAM:  Appearance: Comfortable. No acute distress  HEENT:  Atraumatic. Normocephalic.  Normal oral mucosa  Neurologic: A & O x 3, no gross focal deficits.  Cardiovascular: RRR S1 S2, No murmur, no rubs/gallops. No JVD  Respiratory: Lungs clear to auscultation, unlabored   Gastrointestinal:  Soft, Non-tender, + BS  Lower Extremities: 2+ Peripheral Pulses, No clubbing, cyanosis, or edema  Psychiatry: Patient is calm. No agitation.   Skin: warm and dry.    CURRENT CARDIAC MEDICATIONS:  amLODIPine   Tablet 5 milliGRAM(s) Oral daily  losartan 25 milliGRAM(s) Oral daily  metoprolol succinate ER 25 milliGRAM(s) Oral daily      CURRENT OTHER MEDICATIONS:  acetaminophen     Tablet .. 650 milliGRAM(s) Oral every 6 hours PRN Temp greater or equal to 38C (100.4F), Mild Pain (1 - 3)  baclofen 5 milliGRAM(s) Oral every 12 hours  melatonin 3 milliGRAM(s) Oral at bedtime PRN Insomnia  OLANZapine Injectable 5 milliGRAM(s) IntraMuscular every 6 hours PRN agitation  ondansetron Injectable 4 milliGRAM(s) IV Push every 8 hours PRN Nausea and/or Vomiting  oxyCODONE    IR 5 milliGRAM(s) Oral four times a day PRN for severe pain  sertraline 50 milliGRAM(s) Oral daily  aluminum hydroxide/magnesium hydroxide/simethicone Suspension 30 milliLiter(s) Oral every 4 hours PRN Dyspepsia  famotidine    Tablet 20 milliGRAM(s) Oral two times a day  pantoprazole    Tablet 40 milliGRAM(s) Oral before breakfast  atorvastatin 40 milliGRAM(s) Oral at bedtime  aspirin  chewable 81 milliGRAM(s) Oral daily  sodium chloride 0.9%. 1000 milliLiter(s) (70 mL/Hr) IV Continuous <Continuous>, Stop order after: 24 Hours  ticagrelor 90 milliGRAM(s) Oral every 12 hours      LABS:	 	                            12.7   8.29  )-----------( 346      ( 26 Nov 2024 08:49 )             37.1     11-26    141  |  104  |  10.2  ----------------------------<  92  4.5   |  25.0  |  0.54    Ca    8.9      26 Nov 2024 08:49  Phos  3.3     11-26  Mg     2.0     11-26    TPro  6.2[L]  /  Alb  3.4  /  TBili  0.8  /  DBili  x   /  AST  19  /  ALT  23  /  AlkPhos  91  11-25    PT/INR/PTT ( 24 Nov 2024 09:18 )                       :                       :      12.1         :       28.0                  .        .                   .              .           .       1.07        .                                       Lipid Profile: Date: 11-25 @ 06:33  Total cholesterol 88; Direct LDL: --; HDL: 34; Triglycerides:93    HgA1c:   TSH:

## 2024-11-26 NOTE — PROGRESS NOTE ADULT - ASSESSMENT
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 Patient is nonverbal, but discussed that over the last 24 hours, patient has become increasingly agitated, threw feces at 1 point. Multiple attempts to get out of wheelchair and bed without proper supervision.    Patient is normally nonverbal and with significant resistance to care at baseline. Received a as needed dose of Ativan, but proceeded to fall and strike his head earlier this morning.  A cardiac consult was placed due to elevated troponins. Patient complaining of jaw pain. EKG has TWI in leads v2-v6

## 2024-11-27 LAB
ANION GAP SERPL CALC-SCNC: 14 MMOL/L — SIGNIFICANT CHANGE UP (ref 5–17)
BUN SERPL-MCNC: 11.8 MG/DL — SIGNIFICANT CHANGE UP (ref 8–20)
CALCIUM SERPL-MCNC: 9.5 MG/DL — SIGNIFICANT CHANGE UP (ref 8.4–10.5)
CHLORIDE SERPL-SCNC: 102 MMOL/L — SIGNIFICANT CHANGE UP (ref 96–108)
CO2 SERPL-SCNC: 22 MMOL/L — SIGNIFICANT CHANGE UP (ref 22–29)
CREAT SERPL-MCNC: 0.52 MG/DL — SIGNIFICANT CHANGE UP (ref 0.5–1.3)
EGFR: 110 ML/MIN/1.73M2 — SIGNIFICANT CHANGE UP
GLUCOSE SERPL-MCNC: 84 MG/DL — SIGNIFICANT CHANGE UP (ref 70–99)
HCT VFR BLD CALC: 42.3 % — SIGNIFICANT CHANGE UP (ref 39–50)
HGB BLD-MCNC: 14.2 G/DL — SIGNIFICANT CHANGE UP (ref 13–17)
MAGNESIUM SERPL-MCNC: 2.1 MG/DL — SIGNIFICANT CHANGE UP (ref 1.6–2.6)
MCHC RBC-ENTMCNC: 29.9 PG — SIGNIFICANT CHANGE UP (ref 27–34)
MCHC RBC-ENTMCNC: 33.6 G/DL — SIGNIFICANT CHANGE UP (ref 32–36)
MCV RBC AUTO: 89.1 FL — SIGNIFICANT CHANGE UP (ref 80–100)
PHOSPHATE SERPL-MCNC: 3.1 MG/DL — SIGNIFICANT CHANGE UP (ref 2.4–4.7)
PLATELET # BLD AUTO: 358 K/UL — SIGNIFICANT CHANGE UP (ref 150–400)
POTASSIUM SERPL-MCNC: 4.3 MMOL/L — SIGNIFICANT CHANGE UP (ref 3.5–5.3)
POTASSIUM SERPL-SCNC: 4.3 MMOL/L — SIGNIFICANT CHANGE UP (ref 3.5–5.3)
RBC # BLD: 4.75 M/UL — SIGNIFICANT CHANGE UP (ref 4.2–5.8)
RBC # FLD: 13.4 % — SIGNIFICANT CHANGE UP (ref 10.3–14.5)
SODIUM SERPL-SCNC: 138 MMOL/L — SIGNIFICANT CHANGE UP (ref 135–145)
WBC # BLD: 7.03 K/UL — SIGNIFICANT CHANGE UP (ref 3.8–10.5)
WBC # FLD AUTO: 7.03 K/UL — SIGNIFICANT CHANGE UP (ref 3.8–10.5)

## 2024-11-27 PROCEDURE — 99232 SBSQ HOSP IP/OBS MODERATE 35: CPT

## 2024-11-27 RX ADMIN — LOSARTAN POTASSIUM 25 MILLIGRAM(S): 100 TABLET, FILM COATED ORAL at 05:43

## 2024-11-27 RX ADMIN — Medication 40 MILLIGRAM(S): at 22:55

## 2024-11-27 RX ADMIN — Medication 5 MILLIGRAM(S): at 05:44

## 2024-11-27 RX ADMIN — TICAGRELOR 90 MILLIGRAM(S): 90 TABLET ORAL at 05:43

## 2024-11-27 RX ADMIN — AMLODIPINE BESYLATE 5 MILLIGRAM(S): 10 TABLET ORAL at 05:43

## 2024-11-27 RX ADMIN — FAMOTIDINE 20 MILLIGRAM(S): 20 TABLET, FILM COATED ORAL at 05:43

## 2024-11-27 RX ADMIN — METOPROLOL TARTRATE 25 MILLIGRAM(S): 100 TABLET, FILM COATED ORAL at 05:43

## 2024-11-27 RX ADMIN — TICAGRELOR 90 MILLIGRAM(S): 90 TABLET ORAL at 22:55

## 2024-11-27 NOTE — PROGRESS NOTE ADULT - SUBJECTIVE AND OBJECTIVE BOX
SUBJECTIVE  BRIEF HOSPITAL COURSE SUMMARY: Pt is a 69yMale admitted with chief complaint of encephalopathy, dysphagia, NSTEMI type II    LAST 24 HOURS:   TODAY: Patient nods does not respond appropriately to questions. Patient has been sleeping and difficult to arouse per nursing assistant. Patient nods that he does well but moans during exam. Patient is non-verbal at baseline. ROS unable to be obtained.     OBJECTIVE  PHYSICAL EXAM:  GENERAL: no acute distress, comfortably in bed  HEAD: NC/AT  EYES: PERRLA, EOMI, Non-icteric  ENT: Mucous membranes moist, neck supple  NEURO: No focal deficits, moving left extremities spontaneously, A&Ox0, slurred speach, 2-3 words at a time. Patient with deceits in right side with left facial droop.   PSYCH: Non fluent speech  RESP: CTAB, no wrr, non-labored breathing  CVS: RRR, no murmur appreciated  GI: Soft, non-tender, non-distended, no organomegaly, no appreciable masses, +bs all 4 quadrants  : No aslmeron, no suprapubic tenderness  EXTREMITIES: Nontender, no clubbing, cyanosis, or edema     Vital Signs Last 24 Hrs  T(C): 36.5 (27 Nov 2024 09:20), Max: 36.9 (26 Nov 2024 17:00)  T(F): 97.7 (27 Nov 2024 09:20), Max: 98.5 (26 Nov 2024 17:00)  HR: 61 (27 Nov 2024 09:20) (60 - 68)  BP: 142/71 (27 Nov 2024 09:20) (133/75 - 153/86)  BP(mean): --  RR: 18 (27 Nov 2024 09:20) (18 - 18)  SpO2: 98% (27 Nov 2024 09:20) (94% - 98%)    Parameters below as of 27 Nov 2024 09:20  Patient On (Oxygen Delivery Method): room air            MEDICATIONS  (STANDING):  amLODIPine   Tablet 5 milliGRAM(s) Oral daily  aspirin  chewable 81 milliGRAM(s) Oral daily  atorvastatin 40 milliGRAM(s) Oral at bedtime  baclofen 5 milliGRAM(s) Oral every 12 hours  famotidine    Tablet 20 milliGRAM(s) Oral two times a day  losartan 25 milliGRAM(s) Oral daily  metoprolol succinate ER 25 milliGRAM(s) Oral daily  pantoprazole    Tablet 40 milliGRAM(s) Oral before breakfast  sertraline 50 milliGRAM(s) Oral daily  sodium chloride 0.9%. 1000 milliLiter(s) (70 mL/Hr) IV Continuous <Continuous>  ticagrelor 90 milliGRAM(s) Oral every 12 hours    MEDICATIONS  (PRN):  acetaminophen     Tablet .. 650 milliGRAM(s) Oral every 6 hours PRN Temp greater or equal to 38C (100.4F), Mild Pain (1 - 3)  aluminum hydroxide/magnesium hydroxide/simethicone Suspension 30 milliLiter(s) Oral every 4 hours PRN Dyspepsia  melatonin 3 milliGRAM(s) Oral at bedtime PRN Insomnia  OLANZapine Injectable 5 milliGRAM(s) IntraMuscular every 6 hours PRN agitation  ondansetron Injectable 4 milliGRAM(s) IV Push every 8 hours PRN Nausea and/or Vomiting  oxyCODONE    IR 5 milliGRAM(s) Oral four times a day PRN for severe pain    Allergies    No Known Allergies    Intolerances        LABS:                        14.2   7.03  )-----------( 358      ( 27 Nov 2024 06:07 )             42.3     11-27    138  |  102  |  11.8  ----------------------------<  84  4.3   |  22.0  |  0.52    Ca    9.5      27 Nov 2024 06:07  Phos  3.1     11-27  Mg     2.1     11-27        Urinalysis Basic - ( 27 Nov 2024 06:07 )    Color: x / Appearance: x / SG: x / pH: x  Gluc: 84 mg/dL / Ketone: x  / Bili: x / Urobili: x   Blood: x / Protein: x / Nitrite: x   Leuk Esterase: x / RBC: x / WBC x   Sq Epi: x / Non Sq Epi: x / Bacteria: x      CAPILLARY BLOOD GLUCOSE          CULTURE DATA:    Culture - Urine (collected 11-24-24 @ 14:50)  Source: Clean Catch Clean Catch (Midstream)  Final Report (11-26-24 @ 08:02):    No growth        RADIOLOGY & ADDITIONAL TESTS:

## 2024-11-27 NOTE — PROGRESS NOTE ADULT - ASSESSMENT
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 after fall earlier this morning.  Pt reported by family to have continued decline in mentation since stroke but significantly agitated the past few days.  VS stable overall but had 1 short episode of desaturation reportedly in to 70's but quickly resolved and currenly satting >94% ORA.  Clinically appears dehydrated, is altered, restless and only intermittently follows simple commands but not answering questions appropriately.  Initial w/u significant for Hb 12.1, trops 56-->53, EKG w/ TWI in V2-V6.  CT maxillofacial significant for acute nondisplaced bilateral nasal bone fractures.  CTH and Cspine negative.  CTA neck and brain negative for acute findings.  s/p zyprexa for restlessness/agitaiton in ED.  Cardiology consulted.  Will admit for type II MI and mechanical fall.       Type II MI, possibley demand ischemia   - Trops 56-->53 --> 51 --> 50  - EKG shows V2-V6 t wave inversion   - TTE shows EF of 54% with no wall motion abnormalities and grade 1 dystolic dysfunction. Previous Echo in 7.24 with EF of 74 %  - Check lipid panel and A1c   - restarted home cardiac meds   - Monitor on telemetry   - Cardiology on board - repeat TTE      Mechanical fall w/ subsequent nasal bone fractures  - CT reviewed  - PT  - Analgesics as needed   - Fall and aspiration precautions       Acute metabolic encephalopathy likely 2/2 Vascular Dementia  - Suspect part of decline in mental status related to recent stroke/aneurysm repair  - Per family pts mental status has been declining over the past few weeks but significantly worse recently   - No evidence of acute infection and UA negative for UTI   - Pt s/p fall w/ head trauma and initial CTH negative for ICH   - CXR w/out consolidation/infiltrate   - Dehydrated on exam which can contribute to delirium therefore will give gentle IVFs   - Concern for dysphagia given mental status  - puree diet  - Fall and aspiration precautions   - PRN IM zyprexa if needed for severe agitation but reorient when possible and can consider starting depakote IV as well if remains very agitated   - Would avoid benzo's if possible   - adhere to strict hospital delirium precautions, escalate admission to room  - Once cleared for diet resume home sertraline  -  consultation    Dysphagia  -patient on puree diet no liquids    Hypokalemia  - resolved      Mild normocytic anemia  - Baseline H/H unknown   - Reported to be on 3 blood thinners as per family but awaiting med rec from [facility] to be fax'ed over to confirm (per Dr. 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  Trace filling of the aneurysm towards the upper aspect of the stent noted.   - Can not have MRI as per family bc pt has Rt eye implant that is not MRI compatible  - CXR negative  - restarted DAPT  - home meds restarted        VTE ppx:  SCDs pending repeat CTH to ensure no ICH.      Dispo: Medically acute, needs 3-4 days prior to DC. pending PT, likely needs [facility]

## 2024-11-27 NOTE — PROGRESS NOTE ADULT - ATTENDING COMMENTS
69M with HTN, Rt eye implant, Recent CVA with right side paralysis presenting from [facility] with fall a, nasal fracture, t2 nstemi      T2 NSTEMI  No indication for [facility]    Dysphagia   puree no liquid pending slp re-eval . Possible MBS Friday

## 2024-11-28 LAB
ANION GAP SERPL CALC-SCNC: 18 MMOL/L — HIGH (ref 5–17)
BUN SERPL-MCNC: 14.8 MG/DL — SIGNIFICANT CHANGE UP (ref 8–20)
CALCIUM SERPL-MCNC: 9.6 MG/DL — SIGNIFICANT CHANGE UP (ref 8.4–10.5)
CHLORIDE SERPL-SCNC: 103 MMOL/L — SIGNIFICANT CHANGE UP (ref 96–108)
CO2 SERPL-SCNC: 19 MMOL/L — LOW (ref 22–29)
CREAT SERPL-MCNC: 0.55 MG/DL — SIGNIFICANT CHANGE UP (ref 0.5–1.3)
EGFR: 108 ML/MIN/1.73M2 — SIGNIFICANT CHANGE UP
GLUCOSE SERPL-MCNC: 66 MG/DL — LOW (ref 70–99)
HCT VFR BLD CALC: 44.2 % — SIGNIFICANT CHANGE UP (ref 39–50)
HGB BLD-MCNC: 14.6 G/DL — SIGNIFICANT CHANGE UP (ref 13–17)
MAGNESIUM SERPL-MCNC: 2.3 MG/DL — SIGNIFICANT CHANGE UP (ref 1.6–2.6)
MCHC RBC-ENTMCNC: 29.9 PG — SIGNIFICANT CHANGE UP (ref 27–34)
MCHC RBC-ENTMCNC: 33 G/DL — SIGNIFICANT CHANGE UP (ref 32–36)
MCV RBC AUTO: 90.6 FL — SIGNIFICANT CHANGE UP (ref 80–100)
PHOSPHATE SERPL-MCNC: 3.9 MG/DL — SIGNIFICANT CHANGE UP (ref 2.4–4.7)
PLATELET # BLD AUTO: 340 K/UL — SIGNIFICANT CHANGE UP (ref 150–400)
POTASSIUM SERPL-MCNC: 4.7 MMOL/L — SIGNIFICANT CHANGE UP (ref 3.5–5.3)
POTASSIUM SERPL-SCNC: 4.7 MMOL/L — SIGNIFICANT CHANGE UP (ref 3.5–5.3)
RBC # BLD: 4.88 M/UL — SIGNIFICANT CHANGE UP (ref 4.2–5.8)
RBC # FLD: 13.8 % — SIGNIFICANT CHANGE UP (ref 10.3–14.5)
SODIUM SERPL-SCNC: 140 MMOL/L — SIGNIFICANT CHANGE UP (ref 135–145)
WBC # BLD: 8.91 K/UL — SIGNIFICANT CHANGE UP (ref 3.8–10.5)
WBC # FLD AUTO: 8.91 K/UL — SIGNIFICANT CHANGE UP (ref 3.8–10.5)

## 2024-11-28 PROCEDURE — 99232 SBSQ HOSP IP/OBS MODERATE 35: CPT

## 2024-11-28 RX ORDER — 0.9 % SODIUM CHLORIDE 0.9 %
1000 INTRAVENOUS SOLUTION INTRAVENOUS
Refills: 0 | Status: DISCONTINUED | OUTPATIENT
Start: 2024-11-28 | End: 2024-11-30

## 2024-11-28 RX ADMIN — AMLODIPINE BESYLATE 5 MILLIGRAM(S): 10 TABLET ORAL at 07:03

## 2024-11-28 RX ADMIN — Medication 40 MILLIGRAM(S): at 21:15

## 2024-11-28 RX ADMIN — Medication 81 MILLIGRAM(S): at 11:52

## 2024-11-28 RX ADMIN — FAMOTIDINE 20 MILLIGRAM(S): 20 TABLET, FILM COATED ORAL at 17:24

## 2024-11-28 RX ADMIN — METOPROLOL TARTRATE 25 MILLIGRAM(S): 100 TABLET, FILM COATED ORAL at 07:03

## 2024-11-28 RX ADMIN — SERTRALINE HYDROCHLORIDE 50 MILLIGRAM(S): 100 TABLET, FILM COATED ORAL at 11:52

## 2024-11-28 RX ADMIN — LOSARTAN POTASSIUM 25 MILLIGRAM(S): 100 TABLET, FILM COATED ORAL at 07:03

## 2024-11-28 RX ADMIN — TICAGRELOR 90 MILLIGRAM(S): 90 TABLET ORAL at 17:24

## 2024-11-28 RX ADMIN — OXYCODONE HYDROCHLORIDE 5 MILLIGRAM(S): 30 TABLET ORAL at 19:30

## 2024-11-28 RX ADMIN — Medication 5 MILLIGRAM(S): at 17:24

## 2024-11-28 RX ADMIN — OXYCODONE HYDROCHLORIDE 5 MILLIGRAM(S): 30 TABLET ORAL at 12:01

## 2024-11-28 RX ADMIN — FAMOTIDINE 20 MILLIGRAM(S): 20 TABLET, FILM COATED ORAL at 07:03

## 2024-11-28 RX ADMIN — OXYCODONE HYDROCHLORIDE 5 MILLIGRAM(S): 30 TABLET ORAL at 18:31

## 2024-11-28 RX ADMIN — OXYCODONE HYDROCHLORIDE 5 MILLIGRAM(S): 30 TABLET ORAL at 11:52

## 2024-11-28 RX ADMIN — Medication 5 MILLIGRAM(S): at 07:02

## 2024-11-28 RX ADMIN — Medication 75 MILLILITER(S): at 16:35

## 2024-11-28 RX ADMIN — PANTOPRAZOLE SODIUM 40 MILLIGRAM(S): 40 TABLET, DELAYED RELEASE ORAL at 07:02

## 2024-11-28 RX ADMIN — OLANZAPINE 5 MILLIGRAM(S): 20 TABLET ORAL at 12:11

## 2024-11-28 RX ADMIN — TICAGRELOR 90 MILLIGRAM(S): 90 TABLET ORAL at 07:03

## 2024-11-28 NOTE — PROGRESS NOTE ADULT - ATTENDING COMMENTS
69M with HTN, Rt eye implant, Recent CVA with right side paralysis presenting from [facility] with fall a, nasal fracture, t2 nstemi      T2 NSTEMI - No indication for [facility]    Dysphagia  - puree no liquid pending slp re-eval . Possible MBS Friday ?

## 2024-11-28 NOTE — PROGRESS NOTE ADULT - SUBJECTIVE AND OBJECTIVE BOX
SUBJECTIVE  BRIEF HOSPITAL COURSE SUMMARY: Pt is a 69yMale admitted with chief complaint of encephalopathy, dysphagia, NSTEMI type II    LAST 24 HOURS:  TODAY: Patient calmer today nodding to questions. Patient does not nod to questions appropriately. Patient does follow commands while prompted.     OBJECTIVE  PHYSICAL EXAM:  GENERAL: no acute distress, comfortably in bed  HEAD: NC/AT  EYES: PERRLA, EOMI, Non-icteric  ENT: Mucous membranes moist, neck supple  NEURO: No focal deficits, moving left extremities spontaneously, A&Ox0, slurred speach, 2-3 words at a time. Patient with deceits in right side with left facial droop.   PSYCH: Non fluent speech  RESP: CTAB, no wrr, non-labored breathing  CVS: RRR, no murmur appreciated  GI: Soft, non-tender, non-distended, no organomegaly, no appreciable masses, +bs all 4 quadrants  : No salmeron, no suprapubic tenderness  EXTREMITIES: Nontender, no clubbing, cyanosis, or edema   Vital Signs Last 24 Hrs  T(C): 36.3 (28 Nov 2024 08:35), Max: 36.5 (28 Nov 2024 04:39)  T(F): 97.3 (28 Nov 2024 08:35), Max: 97.7 (28 Nov 2024 04:39)  HR: 77 (28 Nov 2024 08:35) (55 - 77)  BP: 138/83 (28 Nov 2024 08:35) (130/80 - 147/84)  BP(mean): --  RR: 18 (28 Nov 2024 08:35) (18 - 18)  SpO2: 99% (28 Nov 2024 08:35) (97% - 99%)    Parameters below as of 28 Nov 2024 08:35  Patient On (Oxygen Delivery Method): room air            MEDICATIONS  (STANDING):  amLODIPine   Tablet 5 milliGRAM(s) Oral daily  aspirin  chewable 81 milliGRAM(s) Oral daily  atorvastatin 40 milliGRAM(s) Oral at bedtime  baclofen 5 milliGRAM(s) Oral every 12 hours  famotidine    Tablet 20 milliGRAM(s) Oral two times a day  losartan 25 milliGRAM(s) Oral daily  metoprolol succinate ER 25 milliGRAM(s) Oral daily  pantoprazole    Tablet 40 milliGRAM(s) Oral before breakfast  sertraline 50 milliGRAM(s) Oral daily  sodium chloride 0.9%. 1000 milliLiter(s) (70 mL/Hr) IV Continuous <Continuous>  ticagrelor 90 milliGRAM(s) Oral every 12 hours    MEDICATIONS  (PRN):  acetaminophen     Tablet .. 650 milliGRAM(s) Oral every 6 hours PRN Temp greater or equal to 38C (100.4F), Mild Pain (1 - 3)  aluminum hydroxide/magnesium hydroxide/simethicone Suspension 30 milliLiter(s) Oral every 4 hours PRN Dyspepsia  melatonin 3 milliGRAM(s) Oral at bedtime PRN Insomnia  OLANZapine Injectable 5 milliGRAM(s) IntraMuscular every 6 hours PRN agitation  ondansetron Injectable 4 milliGRAM(s) IV Push every 8 hours PRN Nausea and/or Vomiting  oxyCODONE    IR 5 milliGRAM(s) Oral four times a day PRN for severe pain    Allergies    No Known Allergies    Intolerances        LABS:                        14.6   8.91  )-----------( 340      ( 28 Nov 2024 04:30 )             44.2     11-28    140  |  103  |  14.8  ----------------------------<  66[L]  4.7   |  19.0[L]  |  0.55    Ca    9.6      28 Nov 2024 04:30  Phos  3.9     11-28  Mg     2.3     11-28        Urinalysis Basic - ( 28 Nov 2024 04:30 )    Color: x / Appearance: x / SG: x / pH: x  Gluc: 66 mg/dL / Ketone: x  / Bili: x / Urobili: x   Blood: x / Protein: x / Nitrite: x   Leuk Esterase: x / RBC: x / WBC x   Sq Epi: x / Non Sq Epi: x / Bacteria: x      CAPILLARY BLOOD GLUCOSE          CULTURE DATA:    Culture - Urine (collected 11-24-24 @ 14:50)  Source: Clean Catch Clean Catch (Midstream)  Final Report (11-26-24 @ 08:02):    No growth        RADIOLOGY & ADDITIONAL TESTS:

## 2024-11-28 NOTE — PROGRESS NOTE ADULT - ASSESSMENT
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  Pt reported by family to have continued decline in mentation since stroke but significantly agitated the past few days.  VS stable overall but had 1 short episode of desaturation reportedly in to 70's but quickly resolved and currenly satting >94% ORA.  Clinically appears dehydrated, is altered, restless and only intermittently follows simple commands but not answering questions appropriately.  Initial w/u significant for Hb 12.1, trops 56-->53, EKG w/ TWI in V2-V6.  CT maxillofacial significant for acute nondisplaced bilateral nasal bone fractures.  CTH and Cspine negative.  CTA neck and brain negative for acute findings.  s/p zyprexa for restlessness/agitaiton in ED.  Cardiology consulted.  Will admit for type II MI and mechanical fall.       Type II MI, possibley demand ischemia   - Trops 56-->53 --> 51 --> 50  - EKG shows V2-V6 t wave inversion   - TTE shows EF of 54% with no wall motion abnormalities and grade 1 dystolic dysfunction. Previous Echo in 7.24 with EF of 74 %  - Check lipid panel and A1c   - restarted home cardiac meds   - Monitor on telemetry   - Cardiology on board - repeat TTE      Mechanical fall w/ subsequent nasal bone fractures  - CT reviewed  - PT  - Analgesics as needed   - Fall and aspiration precautions       Acute metabolic encephalopathy likely 2/2 Vascular Dementia  - Suspect part of decline in mental status related to recent stroke/aneurysm repair  - Per family pts mental status has been declining over the past few weeks but significantly worse recently   - No evidence of acute infection and UA negative for UTI   - Pt s/p fall w/ head trauma and initial CTH negative for ICH   - CXR w/out consolidation/infiltrate   - Dehydrated on exam which can contribute to delirium therefore will give gentle IVFs   - Concern for dysphagia given mental status  - puree diet  - Fall and aspiration precautions   - PRN IM zyprexa if needed for severe agitation but reorient when possible and can consider starting depakote IV as well if remains very agitated   - Would avoid benzo's if possible   - adhere to strict hospital delirium precautions, escalate admission to room  - Once cleared for diet resume home sertraline  -  consultation    Dysphagia  -patient on puree diet no liquids    Hypokalemia  - resolved      Mild normocytic anemia  - Baseline H/H unknown   - Reported to be on 3 blood thinners as per family but awaiting med rec from [place] to be fax'ed over to confirm (per Dr. 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  Trace filling of the aneurysm towards the upper aspect of the stent noted.   - Can not have MRI as per family bc pt has Rt eye implant that is not MRI compatible  - CXR negative  - restarted DAPT  - home meds restarted

## 2024-11-29 LAB
ANION GAP SERPL CALC-SCNC: 15 MMOL/L — SIGNIFICANT CHANGE UP (ref 5–17)
BUN SERPL-MCNC: 19.4 MG/DL — SIGNIFICANT CHANGE UP (ref 8–20)
CALCIUM SERPL-MCNC: 9.4 MG/DL — SIGNIFICANT CHANGE UP (ref 8.4–10.5)
CHLORIDE SERPL-SCNC: 102 MMOL/L — SIGNIFICANT CHANGE UP (ref 96–108)
CO2 SERPL-SCNC: 21 MMOL/L — LOW (ref 22–29)
CREAT SERPL-MCNC: 0.58 MG/DL — SIGNIFICANT CHANGE UP (ref 0.5–1.3)
EGFR: 106 ML/MIN/1.73M2 — SIGNIFICANT CHANGE UP
GLUCOSE SERPL-MCNC: 75 MG/DL — SIGNIFICANT CHANGE UP (ref 70–99)
HCT VFR BLD CALC: 41.9 % — SIGNIFICANT CHANGE UP (ref 39–50)
HGB BLD-MCNC: 13.9 G/DL — SIGNIFICANT CHANGE UP (ref 13–17)
MAGNESIUM SERPL-MCNC: 2.2 MG/DL — SIGNIFICANT CHANGE UP (ref 1.6–2.6)
MCHC RBC-ENTMCNC: 30 PG — SIGNIFICANT CHANGE UP (ref 27–34)
MCHC RBC-ENTMCNC: 33.2 G/DL — SIGNIFICANT CHANGE UP (ref 32–36)
MCV RBC AUTO: 90.5 FL — SIGNIFICANT CHANGE UP (ref 80–100)
PHOSPHATE SERPL-MCNC: 3.3 MG/DL — SIGNIFICANT CHANGE UP (ref 2.4–4.7)
PLATELET # BLD AUTO: 306 K/UL — SIGNIFICANT CHANGE UP (ref 150–400)
POTASSIUM SERPL-MCNC: 4.6 MMOL/L — SIGNIFICANT CHANGE UP (ref 3.5–5.3)
POTASSIUM SERPL-SCNC: 4.6 MMOL/L — SIGNIFICANT CHANGE UP (ref 3.5–5.3)
RBC # BLD: 4.63 M/UL — SIGNIFICANT CHANGE UP (ref 4.2–5.8)
RBC # FLD: 14 % — SIGNIFICANT CHANGE UP (ref 10.3–14.5)
SODIUM SERPL-SCNC: 138 MMOL/L — SIGNIFICANT CHANGE UP (ref 135–145)
TROPONIN T, HIGH SENSITIVITY RESULT: 34 NG/L — SIGNIFICANT CHANGE UP (ref 0–51)
WBC # BLD: 9.12 K/UL — SIGNIFICANT CHANGE UP (ref 3.8–10.5)
WBC # FLD AUTO: 9.12 K/UL — SIGNIFICANT CHANGE UP (ref 3.8–10.5)

## 2024-11-29 PROCEDURE — 99232 SBSQ HOSP IP/OBS MODERATE 35: CPT

## 2024-11-29 PROCEDURE — 99222 1ST HOSP IP/OBS MODERATE 55: CPT

## 2024-11-29 PROCEDURE — 93010 ELECTROCARDIOGRAM REPORT: CPT

## 2024-11-29 RX ADMIN — TICAGRELOR 90 MILLIGRAM(S): 90 TABLET ORAL at 17:29

## 2024-11-29 RX ADMIN — LOSARTAN POTASSIUM 25 MILLIGRAM(S): 100 TABLET, FILM COATED ORAL at 05:41

## 2024-11-29 RX ADMIN — TICAGRELOR 90 MILLIGRAM(S): 90 TABLET ORAL at 06:02

## 2024-11-29 RX ADMIN — Medication 5 MILLIGRAM(S): at 21:34

## 2024-11-29 RX ADMIN — FAMOTIDINE 20 MILLIGRAM(S): 20 TABLET, FILM COATED ORAL at 05:42

## 2024-11-29 RX ADMIN — AMLODIPINE BESYLATE 5 MILLIGRAM(S): 10 TABLET ORAL at 05:42

## 2024-11-29 RX ADMIN — PANTOPRAZOLE SODIUM 40 MILLIGRAM(S): 40 TABLET, DELAYED RELEASE ORAL at 05:41

## 2024-11-29 RX ADMIN — SERTRALINE HYDROCHLORIDE 50 MILLIGRAM(S): 100 TABLET, FILM COATED ORAL at 11:11

## 2024-11-29 RX ADMIN — Medication 40 MILLIGRAM(S): at 21:33

## 2024-11-29 RX ADMIN — Medication 81 MILLIGRAM(S): at 11:11

## 2024-11-29 RX ADMIN — FAMOTIDINE 20 MILLIGRAM(S): 20 TABLET, FILM COATED ORAL at 17:29

## 2024-11-29 RX ADMIN — Medication 5 MILLIGRAM(S): at 05:42

## 2024-11-29 NOTE — PROGRESS NOTE ADULT - SUBJECTIVE AND OBJECTIVE BOX
SUBJECTIVE  BRIEF HOSPITAL COURSE SUMMARY: Pt is a 69yMale with a PMH of - who presented with -. In the ED, -. Now, -.    LAST 24 HOURS:  TODAY:    OBJECTIVE  PHYSICAL EXAM:  PHYSICAL EXAM:  GENERAL: no acute distress, comfortably in bed  HEAD: NC/AT  EYES: PERRLA, EOMI, Non-icteric  ENT: Mucous membranes moist, neck supple  NEURO: No focal deficits, moving left extremities spontaneously, A&Ox0, slurred speach, 2-3 words at a time. Patient with deceits in right side with left facial droop.   PSYCH: Non fluent speech  RESP: CTAB, no wrr, non-labored breathing  CVS: RRR, no murmur appreciated  GI: Soft, non-tender, non-distended, no organomegaly, no appreciable masses, +bs all 4 quadrants  : No salmeron, no suprapubic tenderness  EXTREMITIES: Nontender, no clubbing, cyanosis, or edema  Vital Signs Last 24 Hrs  T(C): 36.4 (29 Nov 2024 09:38), Max: 36.4 (29 Nov 2024 00:58)  T(F): 97.6 (29 Nov 2024 09:38), Max: 97.6 (29 Nov 2024 09:38)  HR: 70 (29 Nov 2024 09:38) (59 - 78)  BP: 140/85 (29 Nov 2024 09:38) (115/70 - 173/81)  BP(mean): --  RR: 18 (29 Nov 2024 09:38) (18 - 18)  SpO2: 99% (29 Nov 2024 09:38) (96% - 99%)    Parameters below as of 29 Nov 2024 09:38  Patient On (Oxygen Delivery Method): room air            MEDICATIONS  (STANDING):  amLODIPine   Tablet 5 milliGRAM(s) Oral daily  aspirin  chewable 81 milliGRAM(s) Oral daily  atorvastatin 40 milliGRAM(s) Oral at bedtime  baclofen 5 milliGRAM(s) Oral every 12 hours  famotidine    Tablet 20 milliGRAM(s) Oral two times a day  lactated ringers. 1000 milliLiter(s) (75 mL/Hr) IV Continuous <Continuous>  losartan 25 milliGRAM(s) Oral daily  metoprolol succinate ER 25 milliGRAM(s) Oral daily  pantoprazole    Tablet 40 milliGRAM(s) Oral before breakfast  sertraline 50 milliGRAM(s) Oral daily  ticagrelor 90 milliGRAM(s) Oral every 12 hours    MEDICATIONS  (PRN):  acetaminophen     Tablet .. 650 milliGRAM(s) Oral every 6 hours PRN Temp greater or equal to 38C (100.4F), Mild Pain (1 - 3)  aluminum hydroxide/magnesium hydroxide/simethicone Suspension 30 milliLiter(s) Oral every 4 hours PRN Dyspepsia  melatonin 3 milliGRAM(s) Oral at bedtime PRN Insomnia  OLANZapine Injectable 5 milliGRAM(s) IntraMuscular every 6 hours PRN agitation  ondansetron Injectable 4 milliGRAM(s) IV Push every 8 hours PRN Nausea and/or Vomiting  oxyCODONE    IR 5 milliGRAM(s) Oral four times a day PRN for severe pain    Allergies    No Known Allergies    Intolerances        LABS:                        13.9   9.12  )-----------( 306      ( 29 Nov 2024 06:10 )             41.9     11-29    138  |  102  |  19.4  ----------------------------<  75  4.6   |  21.0[L]  |  0.58    Ca    9.4      29 Nov 2024 06:10  Phos  3.3     11-29  Mg     2.2     11-29        Urinalysis Basic - ( 29 Nov 2024 06:10 )    Color: x / Appearance: x / SG: x / pH: x  Gluc: 75 mg/dL / Ketone: x  / Bili: x / Urobili: x   Blood: x / Protein: x / Nitrite: x   Leuk Esterase: x / RBC: x / WBC x   Sq Epi: x / Non Sq Epi: x / Bacteria: x      CAPILLARY BLOOD GLUCOSE          CULTURE DATA:    Culture - Urine (collected 11-24-24 @ 14:50)  Source: Clean Catch Clean Catch (Midstream)  Final Report (11-26-24 @ 08:02):    No growth        RADIOLOGY & ADDITIONAL TESTS: SUBJECTIVE  BRIEF HOSPITAL COURSE SUMMARY: Pt is a 69yMale admitted with chief complaint of encephalopathy, dysphagia, NSTEMI type II      LAST 24 HOURS: Patient Dysphagic. Patient choking on moderately thick liquids.   TODAY: Patient nodding to questions following commands    OBJECTIVE  PHYSICAL EXAM:    GENERAL: no acute distress, comfortably in bed  HEAD: NC/AT  EYES: PERRLA, EOMI, Non-icteric  ENT: Mucous membranes moist, neck supple  NEURO: No focal deficits, moving left extremities spontaneously, A&Ox0, slurred speach, 2-3 words at a time. Patient with deceits in right side with left facial droop.   PSYCH: Non fluent speech  RESP: CTAB, no wrr, non-labored breathing  CVS: RRR, no murmur appreciated  GI: Soft, non-tender, non-distended, no organomegaly, no appreciable masses, +bs all 4 quadrants  : No salmeron, no suprapubic tenderness  EXTREMITIES: Nontender, no clubbing, cyanosis, or edema  Vital Signs Last 24 Hrs  T(C): 36.4 (29 Nov 2024 09:38), Max: 36.4 (29 Nov 2024 00:58)  T(F): 97.6 (29 Nov 2024 09:38), Max: 97.6 (29 Nov 2024 09:38)  HR: 70 (29 Nov 2024 09:38) (59 - 78)  BP: 140/85 (29 Nov 2024 09:38) (115/70 - 173/81)  BP(mean): --  RR: 18 (29 Nov 2024 09:38) (18 - 18)  SpO2: 99% (29 Nov 2024 09:38) (96% - 99%)    Parameters below as of 29 Nov 2024 09:38  Patient On (Oxygen Delivery Method): room air            MEDICATIONS  (STANDING):  amLODIPine   Tablet 5 milliGRAM(s) Oral daily  aspirin  chewable 81 milliGRAM(s) Oral daily  atorvastatin 40 milliGRAM(s) Oral at bedtime  baclofen 5 milliGRAM(s) Oral every 12 hours  famotidine    Tablet 20 milliGRAM(s) Oral two times a day  lactated ringers. 1000 milliLiter(s) (75 mL/Hr) IV Continuous <Continuous>  losartan 25 milliGRAM(s) Oral daily  metoprolol succinate ER 25 milliGRAM(s) Oral daily  pantoprazole    Tablet 40 milliGRAM(s) Oral before breakfast  sertraline 50 milliGRAM(s) Oral daily  ticagrelor 90 milliGRAM(s) Oral every 12 hours    MEDICATIONS  (PRN):  acetaminophen     Tablet .. 650 milliGRAM(s) Oral every 6 hours PRN Temp greater or equal to 38C (100.4F), Mild Pain (1 - 3)  aluminum hydroxide/magnesium hydroxide/simethicone Suspension 30 milliLiter(s) Oral every 4 hours PRN Dyspepsia  melatonin 3 milliGRAM(s) Oral at bedtime PRN Insomnia  OLANZapine Injectable 5 milliGRAM(s) IntraMuscular every 6 hours PRN agitation  ondansetron Injectable 4 milliGRAM(s) IV Push every 8 hours PRN Nausea and/or Vomiting  oxyCODONE    IR 5 milliGRAM(s) Oral four times a day PRN for severe pain    Allergies    No Known Allergies    Intolerances        LABS:                        13.9   9.12  )-----------( 306      ( 29 Nov 2024 06:10 )             41.9     11-29    138  |  102  |  19.4  ----------------------------<  75  4.6   |  21.0[L]  |  0.58    Ca    9.4      29 Nov 2024 06:10  Phos  3.3     11-29  Mg     2.2     11-29        Urinalysis Basic - ( 29 Nov 2024 06:10 )    Color: x / Appearance: x / SG: x / pH: x  Gluc: 75 mg/dL / Ketone: x  / Bili: x / Urobili: x   Blood: x / Protein: x / Nitrite: x   Leuk Esterase: x / RBC: x / WBC x   Sq Epi: x / Non Sq Epi: x / Bacteria: x      CAPILLARY BLOOD GLUCOSE          CULTURE DATA:    Culture - Urine (collected 11-24-24 @ 14:50)  Source: Clean Catch Clean Catch (Midstream)  Final Report (11-26-24 @ 08:02):    No growth        RADIOLOGY & ADDITIONAL TESTS:

## 2024-11-29 NOTE — PROGRESS NOTE ADULT - ASSESSMENT
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  Pt reported by family to have continued decline in mentation since stroke but significantly agitated the past few days.  VS stable overall but had 1 short episode of desaturation reportedly in to 70's but quickly resolved and currenly satting >94% ORA.  Clinically appears dehydrated, is altered, restless and only intermittently follows simple commands but not answering questions appropriately.  Initial w/u significant for Hb 12.1, trops 56-->53, EKG w/ TWI in V2-V6.  CT maxillofacial significant for acute nondisplaced bilateral nasal bone fractures.  CTH and Cspine negative.  CTA neck and brain negative for acute findings.  s/p zyprexa for restlessness/agitaiton in ED.  Cardiology consulted.  Will admit for type II MI and mechanical fall.       Type II MI, possibley demand ischemia   - Trops 56-->53 --> 51 --> 50  - EKG shows V2-V6 t wave inversion   - TTE shows EF of 54% with no wall motion abnormalities and grade 1 dystolic dysfunction. Previous Echo in 7.24 with EF of 74 %  - Check lipid panel and A1c   - restarted home cardiac meds   - Monitor on telemetry   - Cardiology on board - repeat TTE    Bradycardia  -patient bradycardic overnight  -EKG similar to previous  -continue metoprolol      Mechanical fall w/ subsequent nasal bone fractures  - CT reviewed  - PT  - Analgesics as needed   - Fall and aspiration precautions       Acute metabolic encephalopathy likely 2/2 Vascular Dementia  - Suspect part of decline in mental status related to recent stroke/aneurysm repair  - Per family pts mental status has been declining over the past few weeks but significantly worse recently   - No evidence of acute infection and UA negative for UTI   - Pt s/p fall w/ head trauma and initial CTH negative for ICH   - CXR w/out consolidation/infiltrate   - Dehydrated on exam which can contribute to delirium therefore will give gentle IVFs   - Concern for dysphagia given mental status  - puree diet  - Fall and aspiration precautions   - PRN IM zyprexa if needed for severe agitation but reorient when possible and can consider starting depakote IV as well if remains very agitated   - Would avoid benzo's if possible   - adhere to strict hospital delirium precautions, escalate admission to room  - Once cleared for diet resume home sertraline  -  consultation    Dysphagia  -patient on puree diet no liquids  -Cleared for liquid from speech but per RN patient did not tolerate moderately thick liquids.     Hypokalemia  - resolved      Mild normocytic anemia  - Baseline H/H unknown   - Reported to be on 3 blood thinners as per family but awaiting med rec from [place] to be fax'ed over to confirm (per Dr. 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  Trace filling of the aneurysm towards the upper aspect of the stent noted.   - Can not have MRI as per family bc pt has Rt eye implant that is not MRI compatible  - CXR negative  - restarted DAPT  - home meds restarted

## 2024-11-29 NOTE — CONSULT NOTE ADULT - SUBJECTIVE AND OBJECTIVE BOX
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  Pt is a poor historian but as per family he has had worsening decine in mentation since stroke and significantly agitated and impulsive the past few days.  He was restless trying to get out of wheelchair yesterday and fell landing on his face/head this morning and was subsequently sent to hospital for evaluation.    (24 Nov 2024 17:47)>end of copied text      PERTINENT PMH REVIEWED: Yes     PAST MEDICAL & SURGICAL HISTORY:  CVA (cerebrovascular accident)      HTN (hypertension)      H/O aneurysm      S/P cerebral aneurysm repair          SOCIAL HISTORY:                      Substance history:none                    Admitted from:  [facility]                    Jehovah's witness/spirituality:unknown                    Cultural concerns: none                      Surrogate/HCP/Guardian: Phone#:Kisha Solo son [phone]    FAMILY HISTORY:  No family history related to admission diagonsis    Allergies  No Known Allergies      ADVANCE DIRECTIVES/TREATMENT PREFERENCES:  Full Code    Baseline ADLs (prior to admission):  Independent    Karnofsky/Palliative Performance Status Version 2:  %40  http://npcrc.org/files/news/palliative_performance_scale_[email or web address].pdf    Present Symptoms: nods to questions  Dyspnea: no  Nausea/Vomiting: No  Anxiety:  No  Depression: No  Fatigue: No  Loss of appetite: No  Constipation: no    Pain: no, nods no - non verbal cues of pain absent            Character-            Duration-            Effect-            Factors-            Frequency-            Location-            Severity-    Pain AD Score:0  [email or web address].[place].[email or web address]/[email or web address] (press ctrl + left click to view)    Review of Systems: Reviewed  Difficult to obtain due to poor mentation   All others negative    MEDICATIONS  (STANDING):  amLODIPine   Tablet 5 milliGRAM(s) Oral daily  aspirin  chewable 81 milliGRAM(s) Oral daily  atorvastatin 40 milliGRAM(s) Oral at bedtime  baclofen 5 milliGRAM(s) Oral every 12 hours  famotidine    Tablet 20 milliGRAM(s) Oral two times a day  lactated ringers. 1000 milliLiter(s) (75 mL/Hr) IV Continuous <Continuous>  losartan 25 milliGRAM(s) Oral daily  metoprolol succinate ER 25 milliGRAM(s) Oral daily  pantoprazole    Tablet 40 milliGRAM(s) Oral before breakfast  sertraline 50 milliGRAM(s) Oral daily  ticagrelor 90 milliGRAM(s) Oral every 12 hours    MEDICATIONS  (PRN):  acetaminophen     Tablet .. 650 milliGRAM(s) Oral every 6 hours PRN Temp greater or equal to 38C (100.4F), Mild Pain (1 - 3)  aluminum hydroxide/magnesium hydroxide/simethicone Suspension 30 milliLiter(s) Oral every 4 hours PRN Dyspepsia  melatonin 3 milliGRAM(s) Oral at bedtime PRN Insomnia  OLANZapine Injectable 5 milliGRAM(s) IntraMuscular every 6 hours PRN agitation  ondansetron Injectable 4 milliGRAM(s) IV Push every 8 hours PRN Nausea and/or Vomiting  oxyCODONE    IR 5 milliGRAM(s) Oral four times a day PRN for severe pain      PHYSICAL EXAM:    Vital Signs Last 24 Hrs  T(C): 36.3 (29 Nov 2024 04:59), Max: 36.4 (29 Nov 2024 00:58)  T(F): 97.3 (29 Nov 2024 04:59), Max: 97.5 (29 Nov 2024 00:58)  HR: 68 (29 Nov 2024 04:59) (59 - 78)  BP: 173/81 (29 Nov 2024 04:59) (115/70 - 173/81)  BP(mean): --  RR: 18 (29 Nov 2024 04:59) (18 - 18)  SpO2: 98% (29 Nov 2024 04:59) (96% - 98%)    Parameters below as of 29 Nov 2024 04:59  Patient On (Oxygen Delivery Method): room air      General: alert , aphasia     HEENT: normal      Lungs: comfortable    CV: normal      GI: normal       : normal      MSK: weakness     Neuro: cognitive impairment  ,  defecits in right side with left facial droop    Skin: normal     LABS:                        13.9   9.12  )-----------( 306      ( 29 Nov 2024 06:10 )             41.9     11-29    138  |  102  |  19.4  ----------------------------<  75  4.6   |  21.0[L]  |  0.58    Ca    9.4      29 Nov 2024 06:10  Phos  3.3     11-29  Mg     2.2     11-29        Urinalysis Basic - ( 29 Nov 2024 06:10 )    Color: x / Appearance: x / SG: x / pH: x  Gluc: 75 mg/dL / Ketone: x  / Bili: x / Urobili: x   Blood: x / Protein: x / Nitrite: x   Leuk Esterase: x / RBC: x / WBC x   Sq Epi: x / Non Sq Epi: x / Bacteria: x      I&O's Summary      RADIOLOGY & ADDITIONAL STUDIES:  CT head 11-25-24  IMPRESSION:  No evidence of an acute intracranial hemorrhage.

## 2024-11-29 NOTE — CHART NOTE - NSCHARTNOTEFT_GEN_A_CORE
Nutrition Note: Calorie count ordered 11/29-12/1. Sheets taped to wall and nursing staff made aware of the same. RD to follow up with Day 1 results.

## 2024-11-29 NOTE — CONSULT NOTE ADULT - ASSESSMENT
69 year old male hx of recent stroke, aneurysm repair admitted with AMS, dysphagia, NSTEMI  Palliative consulted for GOC    Problem/Recommendation 1:AMS  Likely related to vascular dementia - pt had recent stroke/aneurysm repair and been declining prior to hospitalization   Noted CT head neg acute ICH  Consider trying to avoid/minimize deliriogenic drugs such as benzodiazepines and anticholinergics   Non pharmacologic options for delirium: Frequently orient patient, identify self, maintain consistent staffing and location   Limit stimulation, soft voice, soft lighting, gentle handling  Sleep disturbance support  Provide adequate sensory aides such as hearing aids, glasses, calendar, clock  Soft warm blankets  Bed alarm for safety    Problem/Recommendation 2:Dysphagia  Now on pureed diet  maintain HOB >/= 90 degrees while feeding   small bites   aspiration precautions    Problem/Recommendation 3: Debility  Assist in ADLS  Maintain safety, fall, aspiration precautions  Set bed alarm, chair alarm for safety and fall prevention  Turn and Position in bed     Problem/Recommendation 4: Palliative Care Encounter  Met with patient at bedside, patient nods yes and no to questions , following commands to open and close eyes   Spoke to Dr. Chaparro  Will reach out to family for further GOC conversation   Palliative to follow    Total Time Spent__55__ minutes  Total time also includes discussion during interdisciplinary team rounds, chart review including but limited to prior admissions/   review of medications/ labs/ imaging, examination, care coordination with other health care professionals, documentation EXCLUDING advance care planning discussions.       Thank you for the opportunity to assist with the care of this patient.   [facility] Palliative Medicine Consult Service [phone].        69 year old male hx of recent stroke, aneurysm repair admitted with AMS, dysphagia, NSTEMI  Palliative consulted for GOC    Problem/Recommendation 1:AMS  Likely related to vascular dementia - pt had recent stroke/aneurysm repair and been declining prior to hospitalization   Noted CT head neg acute ICH  Consider trying to avoid/minimize deliriogenic drugs such as benzodiazepines and anticholinergics   Non pharmacologic options for delirium: Frequently orient patient, identify self, maintain consistent staffing and location   Limit stimulation, soft voice, soft lighting, gentle handling  Sleep disturbance support  Provide adequate sensory aides such as hearing aids, glasses, calendar, clock  Soft warm blankets  Bed alarm for safety    Problem/Recommendation 2:Dysphagia  Now on pureed diet not tolerating liquids  maintain HOB >/= 90 degrees while feeding   small bites   aspiration precautions  Barium swallow pending    Problem/Recommendation 3: Debility  Assist in ADLS  Maintain safety, fall, aspiration precautions  Set bed alarm, chair alarm for safety and fall prevention  Turn and Position in bed     Problem/Recommendation 4: Palliative Care Encounter  Met with patient at bedside, patient nods yes and no to questions , following commands to open and close eyes   Spoke to Dr. Chaparro  Spoke to Kisha Solo son [phone] surrogate - prior to [facility] patient was staying with him and was starting to make progress with eating and rehabilitation family was hopeful but then he became symptomatic and arrived at our ER  Provided support as Kisha expressed this has been a difficult time for him and the family. Kisha is very active in his father's care. Discussed with him concern over dysphagia and possibility of needing NGT vs PEG vs comfort feeds if continuation of failing. Kisha expressed understanding he is hoping his father will return to swallowing liquids on his own with time but if needed he would consider PEG if necessary as he is not on the page of comfort feeds. Reassurred Kisha this was not a conversation intended for hospice but to let him know what avenues of care are potentially available. Overall, Kisha very versed in his father's care and advocating well on his behalf.   Palliative to follow  Updated Primary team  Remains Full Code     Total Time Spent__55__ minutes  Total time also includes discussion during interdisciplinary team rounds, chart review including but limited to prior admissions/   review of medications/ labs/ imaging, examination, care coordination with other health care professionals, documentation EXCLUDING advance care planning discussions.     Thank you for the opportunity to assist with the care of this patient.   [facility] Palliative Medicine Consult Service [phone].

## 2024-11-29 NOTE — PROGRESS NOTE ADULT - ATTENDING COMMENTS
69M with HTN, Rt eye implant, Recent CVA with right side paralysis presenting from [facility] with fall a, nasal fracture, t2 nstemi, behavioral disturbances. Was made NPO for dysphagia. SLP cleared for modified diet this morning but did not do well per RN during day. asked SLP to return for re-eval      T2 NSTEMI - No indication for LHC per cardio  Dysphagia  - diet ordered however not tolerating well per RN. Requyesting SLP re-eval  Palliative consulted for GOC. If patient not tolerating diet through weekend will need to have discussions with family next week about long term feeding.

## 2024-11-29 NOTE — CONSULT NOTE ADULT - CONVERSATION DETAILS
Spoke to Kisha Solo son [phone] surrogate - prior to [place] patient was staying with him and was starting to make progress with eating and rehabilitation family was hopeful but then he became symptomatic and arrived at our ER  Provided support as Kisha expressed this has been a difficult time for him and the family. Kisha is very active in his father's care. Discussed with him concern over dysphagia and possibility of needing NGT vs PEG vs comfort feeds if continuation of failing. Kisha expressed understanding he is hoping his father will return to swallowing liquids on his own with time but if needed he would consider PEG if necessary as he is not on the page of comfort feeds. Reassurred Kisha this was not a conversation intended for hospice but to let him know what avenues of care are potentially available. Overall, Kisha very versed in his father's care and advocating well on his behalf.   Remains Full Code

## 2024-11-30 LAB
ANION GAP SERPL CALC-SCNC: 14 MMOL/L — SIGNIFICANT CHANGE UP (ref 5–17)
BUN SERPL-MCNC: 17.4 MG/DL — SIGNIFICANT CHANGE UP (ref 8–20)
CALCIUM SERPL-MCNC: 9.3 MG/DL — SIGNIFICANT CHANGE UP (ref 8.4–10.5)
CHLORIDE SERPL-SCNC: 104 MMOL/L — SIGNIFICANT CHANGE UP (ref 96–108)
CO2 SERPL-SCNC: 24 MMOL/L — SIGNIFICANT CHANGE UP (ref 22–29)
CREAT SERPL-MCNC: 0.55 MG/DL — SIGNIFICANT CHANGE UP (ref 0.5–1.3)
EGFR: 108 ML/MIN/1.73M2 — SIGNIFICANT CHANGE UP
GLUCOSE SERPL-MCNC: 114 MG/DL — HIGH (ref 70–99)
HCT VFR BLD CALC: 38.5 % — LOW (ref 39–50)
HGB BLD-MCNC: 12.8 G/DL — LOW (ref 13–17)
MAGNESIUM SERPL-MCNC: 2 MG/DL — SIGNIFICANT CHANGE UP (ref 1.6–2.6)
MCHC RBC-ENTMCNC: 29.7 PG — SIGNIFICANT CHANGE UP (ref 27–34)
MCHC RBC-ENTMCNC: 33.2 G/DL — SIGNIFICANT CHANGE UP (ref 32–36)
MCV RBC AUTO: 89.3 FL — SIGNIFICANT CHANGE UP (ref 80–100)
PHOSPHATE SERPL-MCNC: 2.9 MG/DL — SIGNIFICANT CHANGE UP (ref 2.4–4.7)
PLATELET # BLD AUTO: 344 K/UL — SIGNIFICANT CHANGE UP (ref 150–400)
POTASSIUM SERPL-MCNC: 3.6 MMOL/L — SIGNIFICANT CHANGE UP (ref 3.5–5.3)
POTASSIUM SERPL-SCNC: 3.6 MMOL/L — SIGNIFICANT CHANGE UP (ref 3.5–5.3)
RBC # BLD: 4.31 M/UL — SIGNIFICANT CHANGE UP (ref 4.2–5.8)
RBC # FLD: 13.7 % — SIGNIFICANT CHANGE UP (ref 10.3–14.5)
SODIUM SERPL-SCNC: 142 MMOL/L — SIGNIFICANT CHANGE UP (ref 135–145)
WBC # BLD: 10.13 K/UL — SIGNIFICANT CHANGE UP (ref 3.8–10.5)
WBC # FLD AUTO: 10.13 K/UL — SIGNIFICANT CHANGE UP (ref 3.8–10.5)

## 2024-11-30 PROCEDURE — 99233 SBSQ HOSP IP/OBS HIGH 50: CPT

## 2024-11-30 RX ADMIN — Medication 81 MILLIGRAM(S): at 09:07

## 2024-11-30 RX ADMIN — OLANZAPINE 5 MILLIGRAM(S): 20 TABLET ORAL at 16:10

## 2024-11-30 RX ADMIN — OLANZAPINE 5 MILLIGRAM(S): 20 TABLET ORAL at 04:36

## 2024-11-30 RX ADMIN — PANTOPRAZOLE SODIUM 40 MILLIGRAM(S): 40 TABLET, DELAYED RELEASE ORAL at 05:21

## 2024-11-30 RX ADMIN — LOSARTAN POTASSIUM 25 MILLIGRAM(S): 100 TABLET, FILM COATED ORAL at 05:20

## 2024-11-30 RX ADMIN — TICAGRELOR 90 MILLIGRAM(S): 90 TABLET ORAL at 05:21

## 2024-11-30 RX ADMIN — Medication 5 MILLIGRAM(S): at 09:06

## 2024-11-30 RX ADMIN — SERTRALINE HYDROCHLORIDE 50 MILLIGRAM(S): 100 TABLET, FILM COATED ORAL at 09:07

## 2024-11-30 RX ADMIN — AMLODIPINE BESYLATE 5 MILLIGRAM(S): 10 TABLET ORAL at 05:21

## 2024-11-30 RX ADMIN — FAMOTIDINE 20 MILLIGRAM(S): 20 TABLET, FILM COATED ORAL at 05:20

## 2024-11-30 RX ADMIN — METOPROLOL TARTRATE 25 MILLIGRAM(S): 100 TABLET, FILM COATED ORAL at 05:21

## 2024-11-30 NOTE — DIETITIAN INITIAL EVALUATION ADULT - ORAL INTAKE PTA/DIET HISTORY
Pt confused and unable to interview. Pt with poor po intake on a calorie count. Pt not eating anything as per day #1 results.

## 2024-11-30 NOTE — DIETITIAN NUTRITION RISK NOTIFICATION - TREATMENT: THE FOLLOWING DIET HAS BEEN RECOMMENDED
Diet, Pureed:   DASH/TLC {Sodium & Cholesterol Restricted} (DASH)  Moderately Thick Liquids (MODTHICKLIQS)  Supplement Feeding Modality:  Oral  Ensure Enlive Cans or Servings Per Day:  1       Frequency:  Two Times a day (11-30-24 @ 11:32) [Active]

## 2024-11-30 NOTE — DIETITIAN INITIAL EVALUATION ADULT - PERTINENT LABORATORY DATA
11-30    142  |  104  |  17.4  ----------------------------<  114[H]  3.6   |  24.0  |  0.55    Ca    9.3      30 Nov 2024 04:33  Phos  2.9     11-30  Mg     2.0     11-30    A1C with Estimated Average Glucose Result: 5.9 % (11-25-24 @ 06:33)

## 2024-11-30 NOTE — PROGRESS NOTE ADULT - ASSESSMENT
69M with HTN, Rt eye implant, Recent CVA with right side paralysis presenting from [facility] with fall a, nasal fracture, t2 nstemi, behavioral disturbances. Patient seen by cardio and states not a candidate for lhc.       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   eventual tevin next week

## 2024-11-30 NOTE — DIETITIAN INITIAL EVALUATION ADULT - REASON INDICATOR FOR ASSESSMENT
Dizziness is improving/speech likely TIA  - A1c-5.1 LDL-76  -c/w telemetry monitoring, no events thus far  -Continue with aspirin 81m, Lipitor 80mg po QHS   -MRI brain-neg for CVA MRI IAC also neg   -PT - no skilled needs  - Echo w/bubble study-P  -neuro follow up appreciated ?TIA LOS, poor po intake

## 2024-11-30 NOTE — DIETITIAN INITIAL EVALUATION ADULT - PERTINENT MEDS FT
MEDICATIONS  (STANDING):  aspirin  chewable 81 milliGRAM(s) Oral daily  atorvastatin 40 milliGRAM(s) Oral at bedtime  baclofen 5 milliGRAM(s) Oral every 12 hours  famotidine    Tablet 20 milliGRAM(s) Oral two times a day  losartan 25 milliGRAM(s) Oral daily  metoprolol succinate ER 25 milliGRAM(s) Oral daily  pantoprazole    Tablet 40 milliGRAM(s) Oral before breakfast  sertraline 50 milliGRAM(s) Oral daily  ticagrelor 90 milliGRAM(s) Oral every 12 hours    MEDICATIONS  (PRN):  acetaminophen     Tablet .. 650 milliGRAM(s) Oral every 6 hours PRN Temp greater or equal to 38C (100.4F), Mild Pain (1 - 3)  aluminum hydroxide/magnesium hydroxide/simethicone Suspension 30 milliLiter(s) Oral every 4 hours PRN Dyspepsia  melatonin 3 milliGRAM(s) Oral at bedtime PRN Insomnia  OLANZapine Injectable 5 milliGRAM(s) IntraMuscular every 6 hours PRN agitation  ondansetron Injectable 4 milliGRAM(s) IV Push every 8 hours PRN Nausea and/or Vomiting  oxyCODONE    IR 5 milliGRAM(s) Oral four times a day PRN for severe pain

## 2024-11-30 NOTE — DIETITIAN INITIAL EVALUATION ADULT - ADD RECOMMEND
Ensure BID to optimize po intake and provide an additional 350 kcal, 20g protein per serving   Continue calorie count x 48 hours and follow up with results.   Diet as per SLP.   Encourage po intake, monitor diet tolerance, and provide assistance at meals as needed.   Rx: MVI daily, vitamin C (500mg daily)

## 2024-11-30 NOTE — DIETITIAN INITIAL EVALUATION ADULT - OTHER INFO
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

## 2024-11-30 NOTE — PROGRESS NOTE ADULT - SUBJECTIVE AND OBJECTIVE BOX
OMAR LAYNE    [id number]    69y      Male    INTERVAL HPI/OVERNIGHT EVENTS:  patient being seen for ams. patient seen at bedside and is in nad      Vital Signs Last 24 Hrs  T(C): 36.6 (30 Nov 2024 09:00), Max: 36.6 (29 Nov 2024 20:20)  T(F): 97.9 (30 Nov 2024 09:00), Max: 97.9 (30 Nov 2024 09:00)  HR: 94 (30 Nov 2024 09:00) (60 - 94)  BP: 123/71 (30 Nov 2024 09:00) (122/69 - 137/75)  BP(mean): --  RR: 18 (30 Nov 2024 09:00) (18 - 18)  SpO2: 95% (30 Nov 2024 09:00) (95% - 98%)    Parameters below as of 30 Nov 2024 09:00  Patient On (Oxygen Delivery Method): room air        PHYSICAL EXAM:    GENERAL: NAD, well-groomed  HEENT: PERRL, +EOMI  NECK: soft, Supple, No JVD,   CHEST/LUNG: Clear to auscultation bilaterally; No wheezing  HEART: S1S2+, Regular rate and rhythm; No murmurs, rubs, or gallops  ABDOMEN: Soft, Nontender, Nondistended; Bowel sounds present  EXTREMITIES:  2+ Peripheral Pulses,   SKIN: No rashes or lesions  NEURO:  aao x0,       LABS:                        12.8   10.13 )-----------( 344      ( 30 Nov 2024 04:33 )             38.5     11-30    142  |  104  |  17.4  ----------------------------<  114[H]  3.6   |  24.0  |  0.55    Ca    9.3      30 Nov 2024 04:33  Phos  2.9     11-30  Mg     2.0     11-30        Urinalysis Basic - ( 30 Nov 2024 04:33 )    Color: x / Appearance: x / SG: x / pH: x  Gluc: 114 mg/dL / Ketone: x  / Bili: x / Urobili: x   Blood: x / Protein: x / Nitrite: x   Leuk Esterase: x / RBC: x / WBC x   Sq Epi: x / Non Sq Epi: x / Bacteria: x          MEDICATIONS  (STANDING):  aspirin  chewable 81 milliGRAM(s) Oral daily  atorvastatin 40 milliGRAM(s) Oral at bedtime  baclofen 5 milliGRAM(s) Oral every 12 hours  famotidine    Tablet 20 milliGRAM(s) Oral two times a day  losartan 25 milliGRAM(s) Oral daily  metoprolol succinate ER 25 milliGRAM(s) Oral daily  pantoprazole    Tablet 40 milliGRAM(s) Oral before breakfast  sertraline 50 milliGRAM(s) Oral daily  ticagrelor 90 milliGRAM(s) Oral every 12 hours    MEDICATIONS  (PRN):  acetaminophen     Tablet .. 650 milliGRAM(s) Oral every 6 hours PRN Temp greater or equal to 38C (100.4F), Mild Pain (1 - 3)  aluminum hydroxide/magnesium hydroxide/simethicone Suspension 30 milliLiter(s) Oral every 4 hours PRN Dyspepsia  melatonin 3 milliGRAM(s) Oral at bedtime PRN Insomnia  OLANZapine Injectable 5 milliGRAM(s) IntraMuscular every 6 hours PRN agitation  ondansetron Injectable 4 milliGRAM(s) IV Push every 8 hours PRN Nausea and/or Vomiting  oxyCODONE    IR 5 milliGRAM(s) Oral four times a day PRN for severe pain      RADIOLOGY & ADDITIONAL TESTS:

## 2024-11-30 NOTE — DIETITIAN INITIAL EVALUATION ADULT - NS FNS DIET ORDER
Diet, Pureed:   DASH/TLC {Sodium & Cholesterol Restricted} (DASH)  Moderately Thick Liquids (MODTHICKLIQS) (11-30-24 @ 09:18)

## 2024-11-30 NOTE — DIETITIAN INITIAL EVALUATION ADULT - ETIOLOGY
related to inadequate energy intake in setting of NSTEMI, CVA, metabolic encephalopathy, swallowing difficulty

## 2024-12-01 LAB
ALBUMIN SERPL ELPH-MCNC: 3.6 G/DL — SIGNIFICANT CHANGE UP (ref 3.3–5.2)
ALP SERPL-CCNC: 96 U/L — SIGNIFICANT CHANGE UP (ref 40–120)
ALT FLD-CCNC: 21 U/L — SIGNIFICANT CHANGE UP
ANION GAP SERPL CALC-SCNC: 13 MMOL/L — SIGNIFICANT CHANGE UP (ref 5–17)
AST SERPL-CCNC: 24 U/L — SIGNIFICANT CHANGE UP
BASOPHILS # BLD AUTO: 0.08 K/UL — SIGNIFICANT CHANGE UP (ref 0–0.2)
BASOPHILS NFR BLD AUTO: 0.8 % — SIGNIFICANT CHANGE UP (ref 0–2)
BILIRUB SERPL-MCNC: 0.7 MG/DL — SIGNIFICANT CHANGE UP (ref 0.4–2)
BUN SERPL-MCNC: 16.9 MG/DL — SIGNIFICANT CHANGE UP (ref 8–20)
CALCIUM SERPL-MCNC: 9.6 MG/DL — SIGNIFICANT CHANGE UP (ref 8.4–10.5)
CHLORIDE SERPL-SCNC: 105 MMOL/L — SIGNIFICANT CHANGE UP (ref 96–108)
CO2 SERPL-SCNC: 25 MMOL/L — SIGNIFICANT CHANGE UP (ref 22–29)
CREAT SERPL-MCNC: 0.68 MG/DL — SIGNIFICANT CHANGE UP (ref 0.5–1.3)
EGFR: 101 ML/MIN/1.73M2 — SIGNIFICANT CHANGE UP
EOSINOPHIL # BLD AUTO: 0.11 K/UL — SIGNIFICANT CHANGE UP (ref 0–0.5)
EOSINOPHIL NFR BLD AUTO: 1.1 % — SIGNIFICANT CHANGE UP (ref 0–6)
GLUCOSE SERPL-MCNC: 82 MG/DL — SIGNIFICANT CHANGE UP (ref 70–99)
HCT VFR BLD CALC: 40.2 % — SIGNIFICANT CHANGE UP (ref 39–50)
HGB BLD-MCNC: 13.3 G/DL — SIGNIFICANT CHANGE UP (ref 13–17)
IMM GRANULOCYTES NFR BLD AUTO: 0.4 % — SIGNIFICANT CHANGE UP (ref 0–0.9)
LYMPHOCYTES # BLD AUTO: 2.44 K/UL — SIGNIFICANT CHANGE UP (ref 1–3.3)
LYMPHOCYTES # BLD AUTO: 23.8 % — SIGNIFICANT CHANGE UP (ref 13–44)
MAGNESIUM SERPL-MCNC: 2.1 MG/DL — SIGNIFICANT CHANGE UP (ref 1.6–2.6)
MCHC RBC-ENTMCNC: 29.9 PG — SIGNIFICANT CHANGE UP (ref 27–34)
MCHC RBC-ENTMCNC: 33.1 G/DL — SIGNIFICANT CHANGE UP (ref 32–36)
MCV RBC AUTO: 90.3 FL — SIGNIFICANT CHANGE UP (ref 80–100)
MONOCYTES # BLD AUTO: 0.93 K/UL — HIGH (ref 0–0.9)
MONOCYTES NFR BLD AUTO: 9.1 % — SIGNIFICANT CHANGE UP (ref 2–14)
NEUTROPHILS # BLD AUTO: 6.64 K/UL — SIGNIFICANT CHANGE UP (ref 1.8–7.4)
NEUTROPHILS NFR BLD AUTO: 64.8 % — SIGNIFICANT CHANGE UP (ref 43–77)
PLATELET # BLD AUTO: 419 K/UL — HIGH (ref 150–400)
POTASSIUM SERPL-MCNC: 4 MMOL/L — SIGNIFICANT CHANGE UP (ref 3.5–5.3)
POTASSIUM SERPL-SCNC: 4 MMOL/L — SIGNIFICANT CHANGE UP (ref 3.5–5.3)
PROT SERPL-MCNC: 6.6 G/DL — SIGNIFICANT CHANGE UP (ref 6.6–8.7)
RBC # BLD: 4.45 M/UL — SIGNIFICANT CHANGE UP (ref 4.2–5.8)
RBC # FLD: 13.9 % — SIGNIFICANT CHANGE UP (ref 10.3–14.5)
SODIUM SERPL-SCNC: 143 MMOL/L — SIGNIFICANT CHANGE UP (ref 135–145)
WBC # BLD: 10.24 K/UL — SIGNIFICANT CHANGE UP (ref 3.8–10.5)
WBC # FLD AUTO: 10.24 K/UL — SIGNIFICANT CHANGE UP (ref 3.8–10.5)

## 2024-12-01 PROCEDURE — 99232 SBSQ HOSP IP/OBS MODERATE 35: CPT

## 2024-12-01 RX ORDER — 0.9 % SODIUM CHLORIDE 0.9 %
1000 INTRAVENOUS SOLUTION INTRAVENOUS
Refills: 0 | Status: DISCONTINUED | OUTPATIENT
Start: 2024-12-01 | End: 2024-12-02

## 2024-12-01 RX ADMIN — Medication 40 MILLIGRAM(S): at 22:39

## 2024-12-01 RX ADMIN — LOSARTAN POTASSIUM 25 MILLIGRAM(S): 100 TABLET, FILM COATED ORAL at 09:17

## 2024-12-01 RX ADMIN — FAMOTIDINE 20 MILLIGRAM(S): 20 TABLET, FILM COATED ORAL at 09:16

## 2024-12-01 RX ADMIN — Medication 5 MILLIGRAM(S): at 09:16

## 2024-12-01 RX ADMIN — SERTRALINE HYDROCHLORIDE 50 MILLIGRAM(S): 100 TABLET, FILM COATED ORAL at 13:50

## 2024-12-01 RX ADMIN — Medication 81 MILLIGRAM(S): at 13:50

## 2024-12-01 RX ADMIN — Medication 75 MILLILITER(S): at 18:36

## 2024-12-01 RX ADMIN — FAMOTIDINE 20 MILLIGRAM(S): 20 TABLET, FILM COATED ORAL at 18:37

## 2024-12-01 RX ADMIN — Medication 5 MILLIGRAM(S): at 22:39

## 2024-12-01 RX ADMIN — PANTOPRAZOLE SODIUM 40 MILLIGRAM(S): 40 TABLET, DELAYED RELEASE ORAL at 09:17

## 2024-12-01 RX ADMIN — TICAGRELOR 90 MILLIGRAM(S): 90 TABLET ORAL at 09:16

## 2024-12-01 RX ADMIN — TICAGRELOR 90 MILLIGRAM(S): 90 TABLET ORAL at 18:37

## 2024-12-01 RX ADMIN — METOPROLOL TARTRATE 25 MILLIGRAM(S): 100 TABLET, FILM COATED ORAL at 09:16

## 2024-12-01 NOTE — PROGRESS NOTE ADULT - ASSESSMENT
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  Pt reported by family to have continued decline in mentation since stroke but significantly agitated the past few days.  VS stable overall but had 1 short episode of desaturation reportedly in to 70's but quickly resolved and currenly satting >94% ORA.  Clinically appears dehydrated, is altered, restless and only intermittently follows simple commands but not answering questions appropriately.  Initial w/u significant for Hb 12.1, trops 56-->53, EKG w/ TWI in V2-V6.  CT maxillofacial significant for acute nondisplaced bilateral nasal bone fractures.  CTH and Cspine negative.  CTA neck and brain negative for acute findings.  s/p zyprexa for restlessness/agitaiton in ED.  Cardiology consulted.  Will admit for type II MI and mechanical fall.       Type II MI, possibley demand ischemia, resolved  - Trops 56-->53 --> 51 --> 50  - EKG shows V2-V6 t wave inversion   - TTE shows EF of 54% with no wall motion abnormalities and grade 1 dystolic dysfunction. Previous Echo in 7.24 with EF of 74 %  - Check lipid panel and A1c   - restarted home cardiac meds   - Monitor on telemetry     Bradycardia  -patient bradycardic overnight  -EKG similar to previous  -continue metoprolol      Mechanical fall w/ subsequent nasal bone fractures  - CT reviewed  - PT  - Analgesics as needed   - Fall and aspiration precautions       Acute metabolic encephalopathy likely 2/2 Vascular Dementia  - Suspect part of decline in mental status related to recent stroke/aneurysm repair  - Per family pts mental status has been declining over the past few weeks but significantly worse recently   - No evidence of acute infection and UA negative for UTI   - Pt s/p fall w/ head trauma and initial CTH negative for ICH   - CXR w/out consolidation/infiltrate   - Concern for dysphagia given mental status  - puree diet  - Fall and aspiration precautions   - PRN IM zyprexa if needed for severe agitation but reorient when possible   -Seroquel PO in AM  - Would avoid benzo's if possible   - adhere to strict hospital delirium precautions, escalate admission to room  - Once cleared for diet resume home sertraline  -  consultation    Dysphagia  -patient on puree diet no liquids  -Cleared for liquid from speech but per RN patient did not tolerate moderately thick liquids per RN    Hypokalemia  - resolved      Mild normocytic anemia  - Baseline H/H unknown   - DAPT restarted  - Dry blood in and around nares noted   - Hemodynamically stable   - stable  - Transfuse for Hb<8      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  Trace filling of the aneurysm towards the upper aspect of the stent noted.   - Can not have MRI as per family bc pt has Rt eye implant that is not MRI compatible  - CXR negative  - restarted DAPT  - home meds restarted

## 2024-12-01 NOTE — PROGRESS NOTE ADULT - SUBJECTIVE AND OBJECTIVE BOX
SUBJECTIVE  BRIEF HOSPITAL COURSE SUMMARY: Pt is a 69yMale with a PMH of - who presented with -. In the ED, -. Now, -.    LAST 24 HOURS:  TODAY:    OBJECTIVE  PHYSICAL EXAM:    Vital Signs Last 24 Hrs  T(C): 36.5 (01 Dec 2024 05:07), Max: 37.1 (30 Nov 2024 22:46)  T(F): 97.7 (01 Dec 2024 05:07), Max: 98.8 (30 Nov 2024 22:46)  HR: 77 (01 Dec 2024 05:07) (58 - 94)  BP: 117/79 (01 Dec 2024 05:07) (117/79 - 158/84)  BP(mean): --  RR: 18 (01 Dec 2024 05:07) (18 - 18)  SpO2: 97% (01 Dec 2024 05:07) (95% - 97%)    Parameters below as of 01 Dec 2024 05:07  Patient On (Oxygen Delivery Method): room air            MEDICATIONS  (STANDING):  aspirin  chewable 81 milliGRAM(s) Oral daily  atorvastatin 40 milliGRAM(s) Oral at bedtime  baclofen 5 milliGRAM(s) Oral every 12 hours  famotidine    Tablet 20 milliGRAM(s) Oral two times a day  losartan 25 milliGRAM(s) Oral daily  metoprolol succinate ER 25 milliGRAM(s) Oral daily  pantoprazole    Tablet 40 milliGRAM(s) Oral before breakfast  sertraline 50 milliGRAM(s) Oral daily  ticagrelor 90 milliGRAM(s) Oral every 12 hours    MEDICATIONS  (PRN):  acetaminophen     Tablet .. 650 milliGRAM(s) Oral every 6 hours PRN Temp greater or equal to 38C (100.4F), Mild Pain (1 - 3)  aluminum hydroxide/magnesium hydroxide/simethicone Suspension 30 milliLiter(s) Oral every 4 hours PRN Dyspepsia  melatonin 3 milliGRAM(s) Oral at bedtime PRN Insomnia  OLANZapine Injectable 5 milliGRAM(s) IntraMuscular every 6 hours PRN agitation  ondansetron Injectable 4 milliGRAM(s) IV Push every 8 hours PRN Nausea and/or Vomiting  oxyCODONE    IR 5 milliGRAM(s) Oral four times a day PRN for severe pain    Allergies    No Known Allergies    Intolerances        LABS:                        13.3   10.24 )-----------( 419      ( 01 Dec 2024 07:00 )             40.2     12-01    143  |  105  |  16.9  ----------------------------<  82  4.0   |  25.0  |  0.68    Ca    9.6      01 Dec 2024 07:00  Phos  2.9     11-30  Mg     2.1     12-01    TPro  6.6  /  Alb  3.6  /  TBili  0.7  /  DBili  x   /  AST  24  /  ALT  21  /  AlkPhos  96  12-01      Urinalysis Basic - ( 01 Dec 2024 07:00 )    Color: x / Appearance: x / SG: x / pH: x  Gluc: 82 mg/dL / Ketone: x  / Bili: x / Urobili: x   Blood: x / Protein: x / Nitrite: x   Leuk Esterase: x / RBC: x / WBC x   Sq Epi: x / Non Sq Epi: x / Bacteria: x      CAPILLARY BLOOD GLUCOSE          CULTURE DATA:    Culture - Urine (collected 11-24-24 @ 14:50)  Source: Clean Catch Clean Catch (Midstream)  Final Report (11-26-24 @ 08:02):    No growth        RADIOLOGY & ADDITIONAL TESTS: SUBJECTIVE  BRIEF HOSPITAL COURSE SUMMARY: Pt is a 69yMale with a PMH of - who presented with -. In the ED, -. Now, -.    LAST 24 HOURS:  TODAY:    OBJECTIVE  PHYSICAL EXAM:    GENERAL: no acute distress, comfortably in bed  HEAD: NC/AT  EYES: PERRLA, EOMI, Non-icteric  ENT: Mucous membranes moist, neck supple  NEURO: No focal deficits, moving left extremities spontaneously, A&Ox0, non verbal right hemiparapelgia Patient with deceits in right side with left facial droop.   PSYCH: Non fluent speech  RESP: CTAB, no wrr, non-labored breathing  CVS: RRR, no murmur appreciated  GI: Soft, non-tender, non-distended, no organomegaly, no appreciable masses, +bs all 4 quadrants  :  salmeron, no suprapubic tenderness  EXTREMITIES: Nontender, no clubbing, cyanosis, or edema  Vital Signs Last 24 Hrs  T(C): 36.5 (01 Dec 2024 05:07), Max: 37.1 (30 Nov 2024 22:46)  T(F): 97.7 (01 Dec 2024 05:07), Max: 98.8 (30 Nov 2024 22:46)  HR: 77 (01 Dec 2024 05:07) (58 - 94)  BP: 117/79 (01 Dec 2024 05:07) (117/79 - 158/84)  BP(mean): --  RR: 18 (01 Dec 2024 05:07) (18 - 18)  SpO2: 97% (01 Dec 2024 05:07) (95% - 97%)    Parameters below as of 01 Dec 2024 05:07  Patient On (Oxygen Delivery Method): room air            MEDICATIONS  (STANDING):  aspirin  chewable 81 milliGRAM(s) Oral daily  atorvastatin 40 milliGRAM(s) Oral at bedtime  baclofen 5 milliGRAM(s) Oral every 12 hours  famotidine    Tablet 20 milliGRAM(s) Oral two times a day  losartan 25 milliGRAM(s) Oral daily  metoprolol succinate ER 25 milliGRAM(s) Oral daily  pantoprazole    Tablet 40 milliGRAM(s) Oral before breakfast  sertraline 50 milliGRAM(s) Oral daily  ticagrelor 90 milliGRAM(s) Oral every 12 hours    MEDICATIONS  (PRN):  acetaminophen     Tablet .. 650 milliGRAM(s) Oral every 6 hours PRN Temp greater or equal to 38C (100.4F), Mild Pain (1 - 3)  aluminum hydroxide/magnesium hydroxide/simethicone Suspension 30 milliLiter(s) Oral every 4 hours PRN Dyspepsia  melatonin 3 milliGRAM(s) Oral at bedtime PRN Insomnia  OLANZapine Injectable 5 milliGRAM(s) IntraMuscular every 6 hours PRN agitation  ondansetron Injectable 4 milliGRAM(s) IV Push every 8 hours PRN Nausea and/or Vomiting  oxyCODONE    IR 5 milliGRAM(s) Oral four times a day PRN for severe pain    Allergies    No Known Allergies    Intolerances        LABS:                        13.3   10.24 )-----------( 419      ( 01 Dec 2024 07:00 )             40.2     12-01    143  |  105  |  16.9  ----------------------------<  82  4.0   |  25.0  |  0.68    Ca    9.6      01 Dec 2024 07:00  Phos  2.9     11-30  Mg     2.1     12-01    TPro  6.6  /  Alb  3.6  /  TBili  0.7  /  DBili  x   /  AST  24  /  ALT  21  /  AlkPhos  96  12-01      Urinalysis Basic - ( 01 Dec 2024 07:00 )    Color: x / Appearance: x / SG: x / pH: x  Gluc: 82 mg/dL / Ketone: x  / Bili: x / Urobili: x   Blood: x / Protein: x / Nitrite: x   Leuk Esterase: x / RBC: x / WBC x   Sq Epi: x / Non Sq Epi: x / Bacteria: x      CAPILLARY BLOOD GLUCOSE          CULTURE DATA:    Culture - Urine (collected 11-24-24 @ 14:50)  Source: Clean Catch Clean Catch (Midstream)  Final Report (11-26-24 @ 08:02):    No growth        RADIOLOGY & ADDITIONAL TESTS:

## 2024-12-01 NOTE — CHART NOTE - NSCHARTNOTEFT_GEN_A_CORE
Nutrition note: Calorie Count in progress.   Day # 2  1/3 meals recorded. Pt continues with extremely poor PO intake. Per Calorie Count record, pt consumed 1 apple sauce. (80kcal, 0gm protein).     Calorie Count remains in progress x 24 hours. Results and recommendations to be provided tomorrow. RD to remain available PRN.

## 2024-12-02 LAB
ANION GAP SERPL CALC-SCNC: 14 MMOL/L — SIGNIFICANT CHANGE UP (ref 5–17)
BUN SERPL-MCNC: 18.8 MG/DL — SIGNIFICANT CHANGE UP (ref 8–20)
CALCIUM SERPL-MCNC: 9.3 MG/DL — SIGNIFICANT CHANGE UP (ref 8.4–10.5)
CHLORIDE SERPL-SCNC: 106 MMOL/L — SIGNIFICANT CHANGE UP (ref 96–108)
CO2 SERPL-SCNC: 23 MMOL/L — SIGNIFICANT CHANGE UP (ref 22–29)
CREAT SERPL-MCNC: 0.61 MG/DL — SIGNIFICANT CHANGE UP (ref 0.5–1.3)
EGFR: 105 ML/MIN/1.73M2 — SIGNIFICANT CHANGE UP
GLUCOSE SERPL-MCNC: 101 MG/DL — HIGH (ref 70–99)
HCT VFR BLD CALC: 40.3 % — SIGNIFICANT CHANGE UP (ref 39–50)
HGB BLD-MCNC: 13.4 G/DL — SIGNIFICANT CHANGE UP (ref 13–17)
MAGNESIUM SERPL-MCNC: 2 MG/DL — SIGNIFICANT CHANGE UP (ref 1.8–2.6)
MCHC RBC-ENTMCNC: 30 PG — SIGNIFICANT CHANGE UP (ref 27–34)
MCHC RBC-ENTMCNC: 33.3 G/DL — SIGNIFICANT CHANGE UP (ref 32–36)
MCV RBC AUTO: 90.2 FL — SIGNIFICANT CHANGE UP (ref 80–100)
PHOSPHATE SERPL-MCNC: 3 MG/DL — SIGNIFICANT CHANGE UP (ref 2.4–4.7)
PLATELET # BLD AUTO: 330 K/UL — SIGNIFICANT CHANGE UP (ref 150–400)
POTASSIUM SERPL-MCNC: 4 MMOL/L — SIGNIFICANT CHANGE UP (ref 3.5–5.3)
POTASSIUM SERPL-SCNC: 4 MMOL/L — SIGNIFICANT CHANGE UP (ref 3.5–5.3)
RBC # BLD: 4.47 M/UL — SIGNIFICANT CHANGE UP (ref 4.2–5.8)
RBC # FLD: 14.1 % — SIGNIFICANT CHANGE UP (ref 10.3–14.5)
SODIUM SERPL-SCNC: 143 MMOL/L — SIGNIFICANT CHANGE UP (ref 135–145)
WBC # BLD: 9.97 K/UL — SIGNIFICANT CHANGE UP (ref 3.8–10.5)
WBC # FLD AUTO: 9.97 K/UL — SIGNIFICANT CHANGE UP (ref 3.8–10.5)

## 2024-12-02 PROCEDURE — 74230 X-RAY XM SWLNG FUNCJ C+: CPT | Mod: 26

## 2024-12-02 PROCEDURE — 99233 SBSQ HOSP IP/OBS HIGH 50: CPT | Mod: GC

## 2024-12-02 RX ADMIN — FAMOTIDINE 20 MILLIGRAM(S): 20 TABLET, FILM COATED ORAL at 17:25

## 2024-12-02 RX ADMIN — METOPROLOL TARTRATE 25 MILLIGRAM(S): 100 TABLET, FILM COATED ORAL at 05:45

## 2024-12-02 RX ADMIN — TICAGRELOR 90 MILLIGRAM(S): 90 TABLET ORAL at 17:25

## 2024-12-02 RX ADMIN — Medication 81 MILLIGRAM(S): at 12:42

## 2024-12-02 RX ADMIN — LOSARTAN POTASSIUM 25 MILLIGRAM(S): 100 TABLET, FILM COATED ORAL at 05:45

## 2024-12-02 RX ADMIN — TICAGRELOR 90 MILLIGRAM(S): 90 TABLET ORAL at 05:45

## 2024-12-02 RX ADMIN — FAMOTIDINE 20 MILLIGRAM(S): 20 TABLET, FILM COATED ORAL at 05:45

## 2024-12-02 RX ADMIN — SERTRALINE HYDROCHLORIDE 50 MILLIGRAM(S): 100 TABLET, FILM COATED ORAL at 12:42

## 2024-12-02 RX ADMIN — Medication 40 MILLIGRAM(S): at 23:08

## 2024-12-02 RX ADMIN — Medication 5 MILLIGRAM(S): at 23:08

## 2024-12-02 NOTE — PHYSICAL THERAPY INITIAL EVALUATION ADULT - ADDITIONAL COMMENTS
As per chart pt presents from [facility]. No further information available at time of evaluation re: social or fucntional hx. Discussed with CCC

## 2024-12-02 NOTE — PHYSICAL THERAPY INITIAL EVALUATION ADULT - PERTINENT HX OF CURRENT PROBLEM, REHAB EVAL
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), recent hospital admission at 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  Pt reported by family to have continued decline in mentation since stroke but significantly agitated the past few days.  VS stable overall but had 1 short episode of desaturation reportedly in to 70's but quickly resolved and currenly satting >94% ORA.  Clinically appears dehydrated, is altered, restless and only intermittently follows simple commands but not answering questions appropriately.

## 2024-12-02 NOTE — PROGRESS NOTE ADULT - SUBJECTIVE AND OBJECTIVE BOX
SUBJECTIVE  BRIEF HOSPITAL COURSE SUMMARY: Pt is a 69yMale with a PMH of - who presented with -. In the ED, -. Now, -.    LAST 24 HOURS: patient got MBS can tolerate a puree diet with mod thick liquid  TODAY: Patient non verbal, patient gives thumbs up and follow command but does not answer questions appropriately. No ROS could be completed    OBJECTIVE  GENERAL: no acute distress, comfortably in bed  HEAD: NC/AT  EYES: PERRLA, EOMI, Non-icteric  ENT: Mucous membranes moist, neck supple  NEURO: No focal deficits, moving left extremities spontaneously, A&Ox0, non verbal right hemiparapelgia Patient with deceits in right side with left facial droop.   PSYCH: Non fluent speech  RESP: CTAB, no wrr, non-labored breathing  CVS: RRR, no murmur appreciated  GI: Soft, non-tender, non-distended, no organomegaly, no appreciable masses, +bs all 4 quadrants  :  salmeron, no suprapubic tenderness  EXTREMITIES: Nontender, no clubbing, cyanosis, or edema  Vital Signs Last 24 Hrs  T(C): 36.8 (02 Dec 2024 09:01), Max: 36.8 (02 Dec 2024 09:01)  T(F): 98.2 (02 Dec 2024 09:01), Max: 98.2 (02 Dec 2024 09:01)  HR: 58 (02 Dec 2024 09:01) (58 - 79)  BP: 114/71 (02 Dec 2024 09:01) (112/72 - 122/72)  BP(mean): --  RR: 18 (02 Dec 2024 09:01) (18 - 19)  SpO2: 96% (02 Dec 2024 09:01) (94% - 98%)    Parameters below as of 02 Dec 2024 09:01  Patient On (Oxygen Delivery Method): room air            MEDICATIONS  (STANDING):  aspirin  chewable 81 milliGRAM(s) Oral daily  atorvastatin 40 milliGRAM(s) Oral at bedtime  baclofen 5 milliGRAM(s) Oral every 12 hours  famotidine    Tablet 20 milliGRAM(s) Oral two times a day  losartan 25 milliGRAM(s) Oral daily  metoprolol succinate ER 25 milliGRAM(s) Oral daily  pantoprazole    Tablet 40 milliGRAM(s) Oral before breakfast  sertraline 50 milliGRAM(s) Oral daily  ticagrelor 90 milliGRAM(s) Oral every 12 hours    MEDICATIONS  (PRN):  acetaminophen     Tablet .. 650 milliGRAM(s) Oral every 6 hours PRN Temp greater or equal to 38C (100.4F), Mild Pain (1 - 3)  aluminum hydroxide/magnesium hydroxide/simethicone Suspension 30 milliLiter(s) Oral every 4 hours PRN Dyspepsia  melatonin 3 milliGRAM(s) Oral at bedtime PRN Insomnia  OLANZapine Injectable 5 milliGRAM(s) IntraMuscular every 6 hours PRN agitation  ondansetron Injectable 4 milliGRAM(s) IV Push every 8 hours PRN Nausea and/or Vomiting  oxyCODONE    IR 5 milliGRAM(s) Oral four times a day PRN for severe pain    Allergies    No Known Allergies    Intolerances        LABS:                        13.4   9.97  )-----------( 330      ( 02 Dec 2024 06:20 )             40.3     12-02    143  |  106  |  18.8  ----------------------------<  101[H]  4.0   |  23.0  |  0.61    Ca    9.3      02 Dec 2024 06:20  Phos  3.0     12-02  Mg     2.0     12-02    TPro  6.6  /  Alb  3.6  /  TBili  0.7  /  DBili  x   /  AST  24  /  ALT  21  /  AlkPhos  96  12-01      Urinalysis Basic - ( 02 Dec 2024 06:20 )    Color: x / Appearance: x / SG: x / pH: x  Gluc: 101 mg/dL / Ketone: x  / Bili: x / Urobili: x   Blood: x / Protein: x / Nitrite: x   Leuk Esterase: x / RBC: x / WBC x   Sq Epi: x / Non Sq Epi: x / Bacteria: x      CAPILLARY BLOOD GLUCOSE          CULTURE DATA:    Culture - Urine (collected 11-24-24 @ 14:50)  Source: Clean Catch Clean Catch (Midstream)  Final Report (11-26-24 @ 08:02):    No growth        RADIOLOGY & ADDITIONAL TESTS:

## 2024-12-02 NOTE — SWALLOW VFSS/MBS ASSESSMENT ADULT - ROSENBEK'S PENETRATION ASPIRATION SCALE
? penetration scale score: 3x1, difficult to ascertain due to restlessness/shoulder obstruction/(1) no aspiration, contrast does not enter airway ? penetration scale score: 3 x1, difficult to ascertain due to restlessness/shoulder obstruction/(1) no aspiration, contrast does not enter airway

## 2024-12-02 NOTE — SWALLOW VFSS/MBS ASSESSMENT ADULT - DIAGNOSTIC IMPRESSIONS
Mild oral dysphagia for assessed trials impacted by restlessness, cooperation & overall cognition, with reduced lingual strength & coordination  Mild pharyngeal dysphagia with latent onset in swallow trigger, reduced tongue base retraction, reduced hyolaryngeal elevation/excursion & hypoharyngeal contractility. Trace to mild to moderate pharyngeal stasis observed across trials, which was reduced with successive wallow. ? trace penetration above level of vocal folds during the swallow for puree (x1) & moderately thick fluids (x1) cannot be r/o (difficulty to ascertain due to restlessness & shoulder obstruction).     Additional PO trials not administered due to pt cooperation & restlessness (max cues needed t/o study). Additionally, above study results are representative of a small segment in time & do not reflect pt status over course of a meal. Pt therefore is at risk for aspiration with PO intake

## 2024-12-02 NOTE — SWALLOW VFSS/MBS ASSESSMENT ADULT - SLP GENERAL OBSERVATIONS
Pt received & seen seated upright via stretcher, awake/alert, reduced cognition, fair cooperation at best: restless t/o with max cues provided to maintain upright/head neutral positioning, 0/10 pain prer/post

## 2024-12-02 NOTE — PHYSICAL THERAPY INITIAL EVALUATION ADULT - MANUAL MUSCLE TESTING RESULTS, REHAB EVAL
pt cognitive limitations; right UE; hand 1/5, elbow and shoulder 0/5, right LE: hip flexion 2/5, knee flexion 3/5, ankle DF/PF 1/5, left UE and LEs at least 3/5/grossly assessed due to

## 2024-12-02 NOTE — SWALLOW VFSS/MBS ASSESSMENT ADULT - SLP PERTINENT HISTORY OF CURRENT PROBLEM
Pt seen at bedside this admission. Last seen 11/29 with RX for puree, moderately thick fluids via TSPN only. Pt now NPO as per team due to safety concerns with oral intake.

## 2024-12-02 NOTE — CHART NOTE - NSCHARTNOTEFT_GEN_A_CORE
Nutrition Note: Calorie count results for day #3 as follows:    2/3 recorded.  Pt ate well for lunch yesterday 100% of meal however still not meeting nutritional needs due to not eating anything at breakfast.   Total for 3 days not sufficient to meet estimated nutritional requirements.   Ensure provided however unclear if being taken.     Rec continue Ensure BID to optimize po intake and provide an additional 350 kcal, 20g protein per serving   Encourage po intake, monitor diet tolerance, and provide assistance at meals as needed.   Rec MVI, Vit C  RD to remain available

## 2024-12-02 NOTE — PHYSICAL THERAPY INITIAL EVALUATION ADULT - ACTIVE RANGE OF MOTION EXAMINATION, REHAB EVAL
right UE and LE limited by hx of CVA and hemiparesis/Left UE Active ROM was WNL (within normal limits)/Left LE Active ROM was WFL (within functional limits)

## 2024-12-02 NOTE — PHYSICAL THERAPY INITIAL EVALUATION ADULT - MUSCLE TONE ASSESSMENT, REHAB EVAL
left-side extremities/Right UE/Right LE/normal/mildly increased tone/moderately increased tone/spasticity

## 2024-12-02 NOTE — PROGRESS NOTE ADULT - ASSESSMENT
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  Pt reported by family to have continued decline in mentation since stroke but significantly agitated the past few days.  VS stable overall but had 1 short episode of desaturation reportedly in to 70's but quickly resolved and currenly satting >94% ORA.  Clinically appears dehydrated, is altered, restless and only intermittently follows simple commands but not answering questions appropriately.  Initial w/u significant for Hb 12.1, trops 56-->53, EKG w/ TWI in V2-V6.  CT maxillofacial significant for acute nondisplaced bilateral nasal bone fractures.  CTH and Cspine negative.  CTA neck and brain negative for acute findings.  s/p zyprexa for restlessness/agitaiton in ED.  Cardiology consulted.  Will admit for type II MI and mechanical fall.       Type II MI, possibley demand ischemia, resolved  - Trops 56-->53 --> 51 --> 50  - EKG shows V2-V6 t wave inversion   - TTE shows EF of 54% with no wall motion abnormalities and grade 1 dystolic dysfunction. Previous Echo in 7.24 with EF of 74 %  - Check lipid panel and A1c   - restarted home cardiac meds   - Monitor on telemetry     Bradycardia  -patient bradycardic overnight  -EKG similar to previous  -continue metoprolol      Mechanical fall w/ subsequent nasal bone fractures  - CT reviewed  - PT  - Analgesics as needed   - Fall and aspiration precautions       Acute metabolic encephalopathy likely 2/2 Vascular Dementia  - Suspect part of decline in mental status related to recent stroke/aneurysm repair  - Per family pts mental status has been declining over the past few weeks but significantly worse recently   - No evidence of acute infection and UA negative for UTI   - Pt s/p fall w/ head trauma and initial CTH negative for ICH   - CXR w/out consolidation/infiltrate   - Concern for dysphagia given mental status  - puree diet  - Fall and aspiration precautions   - PRN IM zyprexa if needed for severe agitation but reorient when possible   -Seroquel PO in AM  - Would avoid benzo's if possible   - adhere to strict hospital delirium precautions, escalate admission to room  - Once cleared for diet resume home sertraline  -  consultation    Dysphagia  -per speech no mechanical dysfunction to eating  -Patient with poor motivation.   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  Trace filling of the aneurysm towards the upper aspect of the stent noted.   - Can not have MRI as per family bc pt has Rt eye implant that is not MRI compatible  - CXR negative  - restarted DAPT  - home meds restarted

## 2024-12-02 NOTE — SWALLOW VFSS/MBS ASSESSMENT ADULT - COMMENTS
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  Pt reported by family to have continued decline in mentation since stroke but significantly agitated the past few days.  VS stable overall but had 1 short episode of desaturation reportedly in to 70's but quickly resolved and currenly satting >94% ORA.  Clinically appears dehydrated, is altered, restless and only intermittently follows simple commands but not answering questions appropriately.  Initial w/u significant for Hb 12.1, trops 56-->53, EKG w/ TWI in V2-V6.  CT maxillofacial significant for acute nondisplaced bilateral nasal bone fractures.  CTH and Cspine negative.  CTA neck and brain negative for acute findings.  s/p zyprexa for restlessness/agitaiton in ED.  Cardiology consulted.  Will admit for type II MI and mechanical fall."

## 2024-12-02 NOTE — PHYSICAL THERAPY INITIAL EVALUATION ADULT - LEVEL OF INDEPENDENCE: STAND/SIT, REHAB EVAL
pt resisting, unable to report why due to cognition/speech limitations. pt pushing back into bed/unable to perform

## 2024-12-02 NOTE — SWALLOW VFSS/MBS ASSESSMENT ADULT - ORAL PHASE
Delayed oral transit time/Reduced anterior - posterior transport/Uncontrolled bolus / spillover in iman-pharynx/Uncontrolled bolus / spillover in hypopharynx Uncontrolled bolus / spillover in iman-pharynx/Uncontrolled bolus / spillover in hypopharynx

## 2024-12-02 NOTE — SWALLOW VFSS/MBS ASSESSMENT ADULT - RECOMMENDED CONSISTENCY
Puree   Moderately thick fluids via tspn/cup Puree   Moderately thick fluids via tspn/cup, AS TOLERATED

## 2024-12-03 LAB
ALBUMIN SERPL ELPH-MCNC: 3.6 G/DL — SIGNIFICANT CHANGE UP (ref 3.3–5.2)
ALP SERPL-CCNC: 98 U/L — SIGNIFICANT CHANGE UP (ref 40–120)
ALT FLD-CCNC: 23 U/L — SIGNIFICANT CHANGE UP
ANION GAP SERPL CALC-SCNC: 13 MMOL/L — SIGNIFICANT CHANGE UP (ref 5–17)
AST SERPL-CCNC: 21 U/L — SIGNIFICANT CHANGE UP
BILIRUB SERPL-MCNC: 0.6 MG/DL — SIGNIFICANT CHANGE UP (ref 0.4–2)
BUN SERPL-MCNC: 18.3 MG/DL — SIGNIFICANT CHANGE UP (ref 8–20)
CALCIUM SERPL-MCNC: 9.1 MG/DL — SIGNIFICANT CHANGE UP (ref 8.4–10.5)
CHLORIDE SERPL-SCNC: 107 MMOL/L — SIGNIFICANT CHANGE UP (ref 96–108)
CO2 SERPL-SCNC: 26 MMOL/L — SIGNIFICANT CHANGE UP (ref 22–29)
CREAT SERPL-MCNC: 0.56 MG/DL — SIGNIFICANT CHANGE UP (ref 0.5–1.3)
EGFR: 107 ML/MIN/1.73M2 — SIGNIFICANT CHANGE UP
GLUCOSE SERPL-MCNC: 109 MG/DL — HIGH (ref 70–99)
HCT VFR BLD CALC: 40.5 % — SIGNIFICANT CHANGE UP (ref 39–50)
HGB BLD-MCNC: 13.3 G/DL — SIGNIFICANT CHANGE UP (ref 13–17)
MCHC RBC-ENTMCNC: 29.8 PG — SIGNIFICANT CHANGE UP (ref 27–34)
MCHC RBC-ENTMCNC: 32.8 G/DL — SIGNIFICANT CHANGE UP (ref 32–36)
MCV RBC AUTO: 90.6 FL — SIGNIFICANT CHANGE UP (ref 80–100)
PLATELET # BLD AUTO: 343 K/UL — SIGNIFICANT CHANGE UP (ref 150–400)
POTASSIUM SERPL-MCNC: 3.8 MMOL/L — SIGNIFICANT CHANGE UP (ref 3.5–5.3)
POTASSIUM SERPL-SCNC: 3.8 MMOL/L — SIGNIFICANT CHANGE UP (ref 3.5–5.3)
PROT SERPL-MCNC: 6.6 G/DL — SIGNIFICANT CHANGE UP (ref 6.6–8.7)
RBC # BLD: 4.47 M/UL — SIGNIFICANT CHANGE UP (ref 4.2–5.8)
RBC # FLD: 14.1 % — SIGNIFICANT CHANGE UP (ref 10.3–14.5)
SODIUM SERPL-SCNC: 146 MMOL/L — HIGH (ref 135–145)
WBC # BLD: 8.55 K/UL — SIGNIFICANT CHANGE UP (ref 3.8–10.5)
WBC # FLD AUTO: 8.55 K/UL — SIGNIFICANT CHANGE UP (ref 3.8–10.5)

## 2024-12-03 PROCEDURE — 99233 SBSQ HOSP IP/OBS HIGH 50: CPT | Mod: GC

## 2024-12-03 PROCEDURE — 99232 SBSQ HOSP IP/OBS MODERATE 35: CPT

## 2024-12-03 RX ORDER — MIRTAZAPINE 15 MG/1
7.5 TABLET, FILM COATED ORAL DAILY
Refills: 0 | Status: DISCONTINUED | OUTPATIENT
Start: 2024-12-03 | End: 2024-12-10

## 2024-12-03 RX ORDER — DRONABINOL 2.5 MG
2.5 CAPSULE ORAL
Refills: 0 | Status: COMPLETED | OUTPATIENT
Start: 2024-12-03 | End: 2024-12-10

## 2024-12-03 RX ORDER — SODIUM CHLORIDE 9 MG/ML
1000 INJECTION, SOLUTION INTRAMUSCULAR; INTRAVENOUS; SUBCUTANEOUS
Refills: 0 | Status: DISCONTINUED | OUTPATIENT
Start: 2024-12-03 | End: 2024-12-03

## 2024-12-03 RX ADMIN — Medication 5 MILLIGRAM(S): at 08:34

## 2024-12-03 RX ADMIN — TICAGRELOR 90 MILLIGRAM(S): 90 TABLET ORAL at 17:27

## 2024-12-03 RX ADMIN — Medication 2.5 MILLIGRAM(S): at 17:26

## 2024-12-03 RX ADMIN — FAMOTIDINE 20 MILLIGRAM(S): 20 TABLET, FILM COATED ORAL at 17:26

## 2024-12-03 RX ADMIN — Medication 5 MILLIGRAM(S): at 22:54

## 2024-12-03 RX ADMIN — METOPROLOL TARTRATE 25 MILLIGRAM(S): 100 TABLET, FILM COATED ORAL at 05:41

## 2024-12-03 RX ADMIN — FAMOTIDINE 20 MILLIGRAM(S): 20 TABLET, FILM COATED ORAL at 05:41

## 2024-12-03 RX ADMIN — Medication 81 MILLIGRAM(S): at 17:27

## 2024-12-03 RX ADMIN — MIRTAZAPINE 7.5 MILLIGRAM(S): 15 TABLET, FILM COATED ORAL at 17:26

## 2024-12-03 RX ADMIN — TICAGRELOR 90 MILLIGRAM(S): 90 TABLET ORAL at 05:41

## 2024-12-03 RX ADMIN — LOSARTAN POTASSIUM 25 MILLIGRAM(S): 100 TABLET, FILM COATED ORAL at 05:41

## 2024-12-03 RX ADMIN — SERTRALINE HYDROCHLORIDE 50 MILLIGRAM(S): 100 TABLET, FILM COATED ORAL at 17:26

## 2024-12-03 RX ADMIN — Medication 40 MILLIGRAM(S): at 22:54

## 2024-12-03 NOTE — PROGRESS NOTE ADULT - SUBJECTIVE AND OBJECTIVE BOX
Palliative Care Followup    Present Symptoms: patient noded no to dyspnea, pain, n/v/d. Patient nodded yes to hunger and gestured towards his bedside table.   nods yes and no to questions, mouthes some responses, reaches for things he needs     Pain: no            Character-            Duration-            Effect-            Factors-            Frequency-            Location-            Severity-    Pain AD Score:0  [email or web address] (press ctrl + left click to view)    Review of Systems: Reviewed  Unable to obtain due to poor mentation   All others negative    MEDICATIONS  (STANDING):  aspirin  chewable 81 milliGRAM(s) Oral daily  atorvastatin 40 milliGRAM(s) Oral at bedtime  baclofen 5 milliGRAM(s) Oral every 12 hours  dronabinol 2.5 milliGRAM(s) Oral two times a day  famotidine    Tablet 20 milliGRAM(s) Oral two times a day  losartan 25 milliGRAM(s) Oral daily  metoprolol succinate ER 25 milliGRAM(s) Oral daily  mirtazapine 7.5 milliGRAM(s) Oral daily  pantoprazole    Tablet 40 milliGRAM(s) Oral before breakfast  sertraline 50 milliGRAM(s) Oral daily  ticagrelor 90 milliGRAM(s) Oral every 12 hours    MEDICATIONS  (PRN):  acetaminophen     Tablet .. 650 milliGRAM(s) Oral every 6 hours PRN Temp greater or equal to 38C (100.4F), Mild Pain (1 - 3)  aluminum hydroxide/magnesium hydroxide/simethicone Suspension 30 milliLiter(s) Oral every 4 hours PRN Dyspepsia  melatonin 3 milliGRAM(s) Oral at bedtime PRN Insomnia  OLANZapine Injectable 5 milliGRAM(s) IntraMuscular every 6 hours PRN agitation  ondansetron Injectable 4 milliGRAM(s) IV Push every 8 hours PRN Nausea and/or Vomiting      PHYSICAL EXAM:    Vital Signs Last 24 Hrs  T(C): 36.6 (03 Dec 2024 08:37), Max: 36.9 (02 Dec 2024 17:30)  T(F): 97.8 (03 Dec 2024 08:37), Max: 98.4 (02 Dec 2024 17:30)  HR: 55 (03 Dec 2024 08:37) (55 - 76)  BP: 161/65 (03 Dec 2024 08:37) (129/76 - 161/65)  BP(mean): --  RR: 18 (03 Dec 2024 08:37) (18 - 18)  SpO2: 97% (03 Dec 2024 08:37) (95% - 97%)    Parameters below as of 03 Dec 2024 08:37  Patient On (Oxygen Delivery Method): room air      General: alert  nods head in response, moves arms and gestures, aphasia    Karnofsky:  %30    HEENT: dry mouth      Lungs: comfortable     CV: normal      GI:  dysphagia    : incontinent     MSK: weakness     Skin: normal     LABS:                          13.3   8.55  )-----------( 343      ( 03 Dec 2024 09:37 )             40.5     12-03    146[H]  |  107  |  18.3  ----------------------------<  109[H]  3.8   |  26.0  |  0.56    Ca    9.1      03 Dec 2024 09:37  Phos  3.0     12-02  Mg     2.0     12-02    TPro  6.6  /  Alb  3.6  /  TBili  0.6  /  DBili  x   /  AST  21  /  ALT  23  /  AlkPhos  98  12-03      Urinalysis Basic - ( 03 Dec 2024 09:37 )    Color: x / Appearance: x / SG: x / pH: x  Gluc: 109 mg/dL / Ketone: x  / Bili: x / Urobili: x   Blood: x / Protein: x / Nitrite: x   Leuk Esterase: x / RBC: x / WBC x   Sq Epi: x / Non Sq Epi: x / Bacteria: x      I&O's Summary    02 Dec 2024 07:01  -  03 Dec 2024 07:00  --------------------------------------------------------  IN: 240 mL / OUT: 300 mL / NET: -60 mL      RADIOLOGY & ADDITIONAL STUDIES:  No new today    ADVANCE DIRECTIVES/TREATMENT PREFERENCES:  Full Code

## 2024-12-03 NOTE — PROGRESS NOTE ADULT - ATTENDING COMMENTS
patient with vascualr dementia / likely advanced / progressive   patient continues to have dec po intake   may have component of depression   will start on mirtazapine , marinol   mild hypernatremia   will start gentle ivf   plan for goc/ palliative care team meeting with family when all family members able to be present   plan for eventual dc back to [facility] when medically optimized

## 2024-12-03 NOTE — PROGRESS NOTE ADULT - SUBJECTIVE AND OBJECTIVE BOX
SUBJECTIVE  BRIEF HOSPITAL COURSE SUMMARY: Pt is a 69yMale admitted with chief complaint of encephalopathy, dysphagia, NSTEMI type II      LAST 24 HOURS: Patient medically stable for discharge at this time.   TODAY: Patient non verbal at baseline. PAtient nods to questions, follows directions. ROS not completed due to non verbal.     OBJECTIVE  PHYSICAL EXAM:  GENERAL: no acute distress, comfortably in bed  HEAD: NC/AT  EYES: PERRLA, EOMI, Non-icteric  ENT: Mucous membranes moist, neck supple  NEURO: No focal deficits, moving left extremities spontaneously, A&Ox0, slurred speach, 2-3 words at a time. Patient with deceits in right side with left facial droop.   PSYCH: Non fluent speech  RESP: CTAB, no wrr, non-labored breathing  CVS: RRR, no murmur appreciated  GI: Soft, non-tender, non-distended, no organomegaly, no appreciable masses, +bs all 4 quadrants  : salmeron, no suprapubic tenderness  EXTREMITIES: Nontender, no clubbing, cyanosis, or edema  SKIN: No rashes or lesions   Vital Signs Last 24 Hrs  T(C): 36.4 (03 Dec 2024 12:49), Max: 36.9 (02 Dec 2024 17:30)  T(F): 97.6 (03 Dec 2024 12:49), Max: 98.4 (02 Dec 2024 17:30)  HR: 59 (03 Dec 2024 12:49) (55 - 76)  BP: 108/62 (03 Dec 2024 12:49) (108/62 - 161/65)  BP(mean): --  RR: 18 (03 Dec 2024 12:49) (18 - 18)  SpO2: 96% (03 Dec 2024 12:49) (95% - 97%)    Parameters below as of 03 Dec 2024 12:49  Patient On (Oxygen Delivery Method): room air            MEDICATIONS  (STANDING):  aspirin  chewable 81 milliGRAM(s) Oral daily  atorvastatin 40 milliGRAM(s) Oral at bedtime  baclofen 5 milliGRAM(s) Oral every 12 hours  dronabinol 2.5 milliGRAM(s) Oral two times a day  famotidine    Tablet 20 milliGRAM(s) Oral two times a day  losartan 25 milliGRAM(s) Oral daily  metoprolol succinate ER 25 milliGRAM(s) Oral daily  mirtazapine 7.5 milliGRAM(s) Oral daily  pantoprazole    Tablet 40 milliGRAM(s) Oral before breakfast  sertraline 50 milliGRAM(s) Oral daily  ticagrelor 90 milliGRAM(s) Oral every 12 hours    MEDICATIONS  (PRN):  acetaminophen     Tablet .. 650 milliGRAM(s) Oral every 6 hours PRN Temp greater or equal to 38C (100.4F), Mild Pain (1 - 3)  aluminum hydroxide/magnesium hydroxide/simethicone Suspension 30 milliLiter(s) Oral every 4 hours PRN Dyspepsia  melatonin 3 milliGRAM(s) Oral at bedtime PRN Insomnia  OLANZapine Injectable 5 milliGRAM(s) IntraMuscular every 6 hours PRN agitation  ondansetron Injectable 4 milliGRAM(s) IV Push every 8 hours PRN Nausea and/or Vomiting    Allergies    No Known Allergies    Intolerances        LABS:                        13.3   8.55  )-----------( 343      ( 03 Dec 2024 09:37 )             40.5     12-03    146[H]  |  107  |  18.3  ----------------------------<  109[H]  3.8   |  26.0  |  0.56    Ca    9.1      03 Dec 2024 09:37  Phos  3.0     12-02  Mg     2.0     12-02    TPro  6.6  /  Alb  3.6  /  TBili  0.6  /  DBili  x   /  AST  21  /  ALT  23  /  AlkPhos  98  12-03      Urinalysis Basic - ( 03 Dec 2024 09:37 )    Color: x / Appearance: x / SG: x / pH: x  Gluc: 109 mg/dL / Ketone: x  / Bili: x / Urobili: x   Blood: x / Protein: x / Nitrite: x   Leuk Esterase: x / RBC: x / WBC x   Sq Epi: x / Non Sq Epi: x / Bacteria: x      CAPILLARY BLOOD GLUCOSE          CULTURE DATA:    Culture - Urine (collected 11-24-24 @ 14:50)  Source: Clean Catch Clean Catch (Midstream)  Final Report (11-26-24 @ 08:02):    No growth        RADIOLOGY & ADDITIONAL TESTS:

## 2024-12-03 NOTE — PROGRESS NOTE ADULT - ASSESSMENT
69 year old male hx of recent stroke, aneurysm repair admitted with AMS, dysphagia, NSTEMI  Palliative consulted for GOC    Problem/Recommendation 1:AMS  Likely related to vascular dementia - pt had recent stroke/aneurysm repair and been declining prior to hospitalization   Noted CT head neg acute ICH  Consider trying to avoid/minimize deliriogenic drugs such as benzodiazepines and anticholinergics   Non pharmacologic options for delirium: Frequently orient patient, identify self, maintain consistent staffing and location   Limit stimulation, soft voice, soft lighting, gentle handling  Sleep disturbance support  Provide adequate sensory aides such as hearing aids, glasses, calendar, clock  Soft warm blankets  Bed alarm for safety    Problem/Recommendation 2:Dysphagia  Advanced diet since last encounter  maintain HOB >/= 90 degrees while feeding   small bites   aspiration precautions  Barium swallow completed    Problem/Recommendation 3: Debility  Assist in ADLS  Maintain safety, fall, aspiration precautions  Set bed alarm, chair alarm for safety and fall prevention  Turn and Position in bed     Problem/Recommendation 4: Palliative Care Encounter  Met with patient at bedside patient is awake and alert has of expressive aphasia patient nods yes and no to questions, he nodded no to discomforts but when I asked him if he was hungry he nodded yes and mouth yes then gestured towards his bedside table as if he wanted to eat.  Discussed case with primary team, plan to reach out to family today to further discuss overall goals of care including advance directives, diagnosis, hospital course, and plan of care  -Will update after family meeting  69 year old male hx of recent stroke, aneurysm repair admitted with AMS, dysphagia, NSTEMI  Palliative consulted for GOC    Problem/Recommendation 1:AMS  Likely related to vascular dementia - pt had recent stroke/aneurysm repair and been declining prior to hospitalization   Noted CT head neg acute ICH  Consider trying to avoid/minimize deliriogenic drugs such as benzodiazepines and anticholinergics   Non pharmacologic options for delirium: Frequently orient patient, identify self, maintain consistent staffing and location   Limit stimulation, soft voice, soft lighting, gentle handling  Sleep disturbance support  Provide adequate sensory aides such as hearing aids, glasses, calendar, clock  Soft warm blankets  Bed alarm for safety    Problem/Recommendation 2:Dysphagia  Advanced diet since last encounter  maintain HOB >/= 90 degrees while feeding   small bites   aspiration precautions  Barium swallow completed    Problem/Recommendation 3: Debility  Assist in ADLS  Maintain safety, fall, aspiration precautions  Set bed alarm, chair alarm for safety and fall prevention  Turn and Position in bed     Problem/Recommendation 4: Palliative Care Encounter  Met with patient at bedside patient is awake and alert has of expressive aphasia patient nods yes and no to questions, he nodded no to discomforts but when I asked him if he was hungry he nodded yes and mouth yes then gestured towards his bedside table as if he wanted to eat.  Discussed case with primary team, plan to reach out to family today to further discuss overall goals of care including advance directives, diagnosis, hospital course, and plan of care  -Will update after family meeting   Update 245pm: Attempted to reach family along with primary team, no response , will followup tomorrow    35 minutesTotal time also includes discussion during interdisciplinary team rounds, chart review including but limited to prior admissions/   review of medications/ labs/ imaging, examination, care coordination with other health care professionals, documentation EXCLUDING advance care planning discussions.       Thank you for the opportunity to assist with the care of this patient.   [facility] Palliative Medicine Consult Service [phone].

## 2024-12-03 NOTE — PROGRESS NOTE ADULT - ASSESSMENT
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  Pt reported by family to have continued decline in mentation since stroke but significantly agitated the past few days.  VS stable overall but had 1 short episode of desaturation reportedly in to 70's but quickly resolved and currenly satting >94% ORA.  Clinically appears dehydrated, is altered, restless and only intermittently follows simple commands but not answering questions appropriately.  Initial w/u significant for Hb 12.1, trops 56-->53, EKG w/ TWI in V2-V6.  CT maxillofacial significant for acute nondisplaced bilateral nasal bone fractures.  CTH and Cspine negative.  CTA neck and brain negative for acute findings.  s/p zyprexa for restlessness/agitaiton in ED.  Cardiology consulted.  Will admit for type II MI and mechanical fall.       Type II MI, possibley demand ischemia   - Trops 56-->53 --> 51 --> 50  - EKG shows V2-V6 t wave inversion   - TTE shows EF of 54% with no wall motion abnormalities and grade 1 dystolic dysfunction. Previous Echo in 7.24 with EF of 74 %  - Check lipid panel and A1c   - restarted home cardiac meds   - Monitor on telemetry   - Cardiology on board - repeat TTE    Hypernatremia  -Na: 146  -encouraged oral hydration.     Bradycardia  -patient bradycardic overnight  -EKG similar to previous  -continue metoprolol      Mechanical fall w/ subsequent nasal bone fractures  - CT reviewed  - PT  - Analgesics as needed   - Fall and aspiration precautions       Acute metabolic encephalopathy likely 2/2 Vascular Dementia  - Suspect part of decline in mental status related to recent stroke/aneurysm repair  - Per family pts mental status has been declining over the past few weeks but significantly worse recently   - No evidence of acute infection and UA negative for UTI   - Pt s/p fall w/ head trauma and initial CTH negative for ICH   - CXR w/out consolidation/infiltrate   - Dehydrated on exam which can contribute to delirium therefore will give gentle IVFs   - Concern for dysphagia given mental status  - puree diet  - Fall and aspiration precautions   - PRN IM zyprexa if needed for severe agitation but reorient when possible and can consider starting depakote IV as well if remains very agitated   - Would avoid benzo's if possible   - adhere to strict hospital delirium precautions, escalate admission to room  - Once cleared for diet resume home sertraline  -  consultation    Dysphagia  -patient on puree diet no liquids  -Cleared for liquid from speech but per RN patient did not tolerate moderately thick liquids.     Hypokalemia  - resolved      Mild normocytic anemia  - Baseline H/H unknown   - Reported to be on 3 blood thinners as per family but awaiting med rec from [facility] to be fax'ed over to confirm (per Dr. 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  Trace filling of the aneurysm towards the upper aspect of the stent noted.   - Can not have MRI as per family bc pt has Rt eye implant that is not MRI compatible  - CXR negative  - restarted DAPT  - home meds restarted    DCT prophylaxis: SCDs on DAPT

## 2024-12-04 LAB
ANION GAP SERPL CALC-SCNC: 14 MMOL/L — SIGNIFICANT CHANGE UP (ref 5–17)
BUN SERPL-MCNC: 20 MG/DL — SIGNIFICANT CHANGE UP (ref 8–20)
CALCIUM SERPL-MCNC: 9.7 MG/DL — SIGNIFICANT CHANGE UP (ref 8.4–10.5)
CHLORIDE SERPL-SCNC: 110 MMOL/L — HIGH (ref 96–108)
CO2 SERPL-SCNC: 25 MMOL/L — SIGNIFICANT CHANGE UP (ref 22–29)
CREAT SERPL-MCNC: 0.63 MG/DL — SIGNIFICANT CHANGE UP (ref 0.5–1.3)
EGFR: 104 ML/MIN/1.73M2 — SIGNIFICANT CHANGE UP
GLUCOSE SERPL-MCNC: 90 MG/DL — SIGNIFICANT CHANGE UP (ref 70–99)
HCT VFR BLD CALC: 41.3 % — SIGNIFICANT CHANGE UP (ref 39–50)
HGB BLD-MCNC: 13.4 G/DL — SIGNIFICANT CHANGE UP (ref 13–17)
MAGNESIUM SERPL-MCNC: 2.3 MG/DL — SIGNIFICANT CHANGE UP (ref 1.6–2.6)
MCHC RBC-ENTMCNC: 29.8 PG — SIGNIFICANT CHANGE UP (ref 27–34)
MCHC RBC-ENTMCNC: 32.4 G/DL — SIGNIFICANT CHANGE UP (ref 32–36)
MCV RBC AUTO: 91.8 FL — SIGNIFICANT CHANGE UP (ref 80–100)
PHOSPHATE SERPL-MCNC: 3.2 MG/DL — SIGNIFICANT CHANGE UP (ref 2.4–4.7)
PLATELET # BLD AUTO: 323 K/UL — SIGNIFICANT CHANGE UP (ref 150–400)
POTASSIUM SERPL-MCNC: 3.7 MMOL/L — SIGNIFICANT CHANGE UP (ref 3.5–5.3)
POTASSIUM SERPL-SCNC: 3.7 MMOL/L — SIGNIFICANT CHANGE UP (ref 3.5–5.3)
RBC # BLD: 4.5 M/UL — SIGNIFICANT CHANGE UP (ref 4.2–5.8)
RBC # FLD: 14.3 % — SIGNIFICANT CHANGE UP (ref 10.3–14.5)
SODIUM SERPL-SCNC: 149 MMOL/L — HIGH (ref 135–145)
WBC # BLD: 9.56 K/UL — SIGNIFICANT CHANGE UP (ref 3.8–10.5)
WBC # FLD AUTO: 9.56 K/UL — SIGNIFICANT CHANGE UP (ref 3.8–10.5)

## 2024-12-04 PROCEDURE — 99233 SBSQ HOSP IP/OBS HIGH 50: CPT | Mod: GC

## 2024-12-04 RX ORDER — SODIUM CHLORIDE 9 MG/ML
1000 INJECTION, SOLUTION INTRAMUSCULAR; INTRAVENOUS; SUBCUTANEOUS
Refills: 0 | Status: DISCONTINUED | OUTPATIENT
Start: 2024-12-04 | End: 2024-12-08

## 2024-12-04 RX ORDER — ENOXAPARIN SODIUM 30 MG/.3ML
40 INJECTION SUBCUTANEOUS EVERY 24 HOURS
Refills: 0 | Status: DISCONTINUED | OUTPATIENT
Start: 2024-12-04 | End: 2024-12-10

## 2024-12-04 RX ADMIN — LOSARTAN POTASSIUM 25 MILLIGRAM(S): 100 TABLET, FILM COATED ORAL at 05:43

## 2024-12-04 RX ADMIN — Medication 5 MILLIGRAM(S): at 09:24

## 2024-12-04 RX ADMIN — SERTRALINE HYDROCHLORIDE 50 MILLIGRAM(S): 100 TABLET, FILM COATED ORAL at 18:12

## 2024-12-04 RX ADMIN — TICAGRELOR 90 MILLIGRAM(S): 90 TABLET ORAL at 18:14

## 2024-12-04 RX ADMIN — PANTOPRAZOLE SODIUM 40 MILLIGRAM(S): 40 TABLET, DELAYED RELEASE ORAL at 05:48

## 2024-12-04 RX ADMIN — Medication 40 MILLIGRAM(S): at 21:28

## 2024-12-04 RX ADMIN — MIRTAZAPINE 7.5 MILLIGRAM(S): 15 TABLET, FILM COATED ORAL at 18:12

## 2024-12-04 RX ADMIN — SODIUM CHLORIDE 100 MILLILITER(S): 9 INJECTION, SOLUTION INTRAMUSCULAR; INTRAVENOUS; SUBCUTANEOUS at 18:43

## 2024-12-04 RX ADMIN — Medication 5 MILLIGRAM(S): at 21:28

## 2024-12-04 RX ADMIN — TICAGRELOR 90 MILLIGRAM(S): 90 TABLET ORAL at 05:45

## 2024-12-04 RX ADMIN — FAMOTIDINE 20 MILLIGRAM(S): 20 TABLET, FILM COATED ORAL at 18:14

## 2024-12-04 RX ADMIN — Medication 2.5 MILLIGRAM(S): at 05:43

## 2024-12-04 RX ADMIN — Medication 81 MILLIGRAM(S): at 18:14

## 2024-12-04 RX ADMIN — Medication 2.5 MILLIGRAM(S): at 18:12

## 2024-12-04 RX ADMIN — FAMOTIDINE 20 MILLIGRAM(S): 20 TABLET, FILM COATED ORAL at 05:44

## 2024-12-04 NOTE — PROGRESS NOTE ADULT - ATTENDING COMMENTS
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  Pt reported by family to have continued decline in mentation since stroke but significantly agitated the past few days.  CT maxillofacial significant for acute nondisplaced bilateral nasal bone fractures.  CTH and Cspine negative.  CTA neck and brain negative for acute findings. also with elevated trops / likely demand ischemia. patient now admitted with failure to thrive, dec po intake, dehydration. started on mirtazipine , marinol. patient remains full code. ongoing GOC discussion with family.       failure to thrive   Acute metabolic encephalopathy likely 2/2 Vascular Dementia/ progressive / advanced   Type II MI, possibley demand ischemia   Hypernatremia  Bradycardia  Mechanical fall w/ subsequent nasal bone fractures  Dysphagia  Mild normocytic anemia/ likely chronic       - c/w  mirtazapine , marinol   - encourage po intake /  gentle ivf   -plan for goc/ palliative care team meeting with family when all family members able to be present , son requesting tomorrow at 1 pm. family can not meet today. palliative care aware   -plan for eventual dc back to [facility] when medically optimized . 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  Pt reported by family to have continued decline in mentation since stroke but significantly agitated the past few days.  CT maxillofacial significant for acute nondisplaced bilateral nasal bone fractures.  CTH and Cspine negative.  CTA neck and brain negative for acute findings. also with elevated trops / likely demand ischemia. patient now admitted with failure to thrive, dec po intake, dehydration. started on mirtazipine , marinol. patient remains full code. ongoing GOC discussion with family.       failure to thrive   Acute metabolic encephalopathy likely 2/2 Vascular Dementia/ progressive / advanced   Type II MI, possibley demand ischemia   Hypernatremia  Bradycardia  Mechanical fall w/ subsequent nasal bone fractures  dysphagia   Mild normocytic anemia/ likely chronic       - c/w  mirtazapine , marinol   - encourage po intake /  gentle ivf   -plan for goc/ palliative care team meeting with family when all family members able to be present , son requesting tomorrow at 1 pm. family can not meet today. palliative care aware   -plan for eventual dc back to [facility] when medically optimized .

## 2024-12-04 NOTE — PROGRESS NOTE ADULT - SUBJECTIVE AND OBJECTIVE BOX
SUBJECTIVE  BRIEF HOSPITAL COURSE SUMMARY: Pt is a 69yMale admitted with chief complaint of encephalopathy, dysphagia, NSTEMI type II  LAST 24 HOURS: Patient medically stable for discharge at this time.   TODAY: Patient non verbal at baseline. PAtient nods to questions, follows directions. ROS not completed due to non verbal.   OBJECTIVE  PHYSICAL EXAM:  GENERAL: no acute distress, comfortably in bed  HEAD: NC/AT  EYES: PERRLA, EOMI, Non-icteric  ENT: Mucous membranes moist, neck supple  NEURO: moving left extremities spontaneously, A&Ox0, slurred speach, 2-3 words at a time. Patient with deceits in right side with left facial droop.   PSYCH: Non fluent speech  RESP: CTAB, no wrr, non-labored breathing  CVS: RRR, no murmur appreciated  GI: Soft, non-tender, non-distended, no organomegaly, no appreciable masses, +bs all 4 quadrants  : salmeron, no suprapubic tenderness  EXTREMITIES: Nontender, no clubbing, cyanosis, or edema  SKIN: No rashes or lesions   Vital Signs Last 24 Hrs  T(C): 36.1 (04 Dec 2024 09:06), Max: 36.6 (04 Dec 2024 06:02)  T(F): 97 (04 Dec 2024 09:06), Max: 97.8 (04 Dec 2024 06:02)  HR: 52 (04 Dec 2024 09:06) (52 - 77)  BP: 151/68 (04 Dec 2024 09:06) (107/67 - 151/68)  BP(mean): 87 (03 Dec 2024 23:08) (87 - 87)  RR: 18 (04 Dec 2024 09:06) (16 - 18)  SpO2: 99% (04 Dec 2024 09:06) (95% - 99%)    Parameters below as of 04 Dec 2024 09:06  Patient On (Oxygen Delivery Method): room air            MEDICATIONS  (STANDING):  aspirin  chewable 81 milliGRAM(s) Oral daily  atorvastatin 40 milliGRAM(s) Oral at bedtime  baclofen 5 milliGRAM(s) Oral every 12 hours  dronabinol 2.5 milliGRAM(s) Oral two times a day  enoxaparin Injectable 40 milliGRAM(s) SubCutaneous every 24 hours  famotidine    Tablet 20 milliGRAM(s) Oral two times a day  losartan 25 milliGRAM(s) Oral daily  metoprolol succinate ER 25 milliGRAM(s) Oral daily  mirtazapine 7.5 milliGRAM(s) Oral daily  pantoprazole    Tablet 40 milliGRAM(s) Oral before breakfast  sertraline 50 milliGRAM(s) Oral daily  ticagrelor 90 milliGRAM(s) Oral every 12 hours    MEDICATIONS  (PRN):  acetaminophen     Tablet .. 650 milliGRAM(s) Oral every 6 hours PRN Temp greater or equal to 38C (100.4F), Mild Pain (1 - 3)  aluminum hydroxide/magnesium hydroxide/simethicone Suspension 30 milliLiter(s) Oral every 4 hours PRN Dyspepsia  melatonin 3 milliGRAM(s) Oral at bedtime PRN Insomnia  OLANZapine Injectable 5 milliGRAM(s) IntraMuscular every 6 hours PRN agitation  ondansetron Injectable 4 milliGRAM(s) IV Push every 8 hours PRN Nausea and/or Vomiting    Allergies    No Known Allergies    Intolerances        LABS:                        13.3   8.55  )-----------( 343      ( 03 Dec 2024 09:37 )             40.5     12-03    146[H]  |  107  |  18.3  ----------------------------<  109[H]  3.8   |  26.0  |  0.56    Ca    9.1      03 Dec 2024 09:37    TPro  6.6  /  Alb  3.6  /  TBili  0.6  /  DBili  x   /  AST  21  /  ALT  23  /  AlkPhos  98  12-03      Urinalysis Basic - ( 03 Dec 2024 09:37 )    Color: x / Appearance: x / SG: x / pH: x  Gluc: 109 mg/dL / Ketone: x  / Bili: x / Urobili: x   Blood: x / Protein: x / Nitrite: x   Leuk Esterase: x / RBC: x / WBC x   Sq Epi: x / Non Sq Epi: x / Bacteria: x      CAPILLARY BLOOD GLUCOSE          CULTURE DATA:    Culture - Urine (collected 11-24-24 @ 14:50)  Source: Clean Catch Clean Catch (Midstream)  Final Report (11-26-24 @ 08:02):    No growth        RADIOLOGY & ADDITIONAL TESTS:

## 2024-12-04 NOTE — PROGRESS NOTE ADULT - ASSESSMENT
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  Pt reported by family to have continued decline in mentation since stroke but significantly agitated the past few days.  VS stable overall but had 1 short episode of desaturation reportedly in to 70's but quickly resolved and currenly satting >94% ORA.  Clinically appears dehydrated, is altered, restless and only intermittently follows simple commands but not answering questions appropriately.  Initial w/u significant for Hb 12.1, trops 56-->53, EKG w/ TWI in V2-V6.  CT maxillofacial significant for acute nondisplaced bilateral nasal bone fractures.  CTH and Cspine negative.  CTA neck and brain negative for acute findings.  s/p zyprexa for restlessness/agitaiton in ED.  Cardiology consulted.  Will admit for type II MI and mechanical fall.     Failure to thrive.   -Patient cleared for modthick liquids and puree diet by speech and swallow  -Patient can mechanically handle diet but does not have motivation  -calorie count ordered  -meeting with family and palliative tomorrow at 1300.     Type II MI, possibley demand ischemia   - Trops 56-->53 --> 51 --> 50  - EKG shows V2-V6 t wave inversion   - TTE shows EF of 54% with no wall motion abnormalities and grade 1 dystolic dysfunction. Previous Echo in 7.24 with EF of 74 %  - Check lipid panel and A1c   - restarted home cardiac meds   - Monitor on telemetry   - Cardiology on board - repeat TTE    Hypernatremia  -Na: 146 on 12/14/24  -encouraged oral hydration.     Bradycardia  -patient bradycardic overnight  -EKG similar to previous  -continue metoprolol      Mechanical fall w/ subsequent nasal bone fractures  - CT reviewed  - PT  - Analgesics as needed   - Fall and aspiration precautions       Acute metabolic encephalopathy likely 2/2 Vascular Dementia  - Suspect part of decline in mental status related to recent stroke/aneurysm repair  - Per family pts mental status has been declining over the past few weeks but significantly worse recently   - No evidence of acute infection and UA negative for UTI   - Pt s/p fall w/ head trauma and initial CTH negative for ICH   - CXR w/out consolidation/infiltrate   - Dehydrated on exam which can contribute to delirium therefore will give gentle IVFs   - Concern for dysphagia given mental status  - puree diet  - Fall and aspiration precautions   - PRN IM zyprexa if needed for severe agitation but reorient when possible and can consider starting depakote IV as well if remains very agitated   - Would avoid benzo's if possible   - adhere to strict hospital delirium precautions, escalate admission to room  - Once cleared for diet resume home sertraline  -  consultation      Hypokalemia  - resolved      Mild normocytic anemia  - Baseline H/H unknown   - Reported to be on 3 blood thinners as per family but awaiting med rec from [place] to be fax'ed over to confirm (per  first pt is on ASA and brillinta)  - Dry blood in and around nares noted   - Hemodynamically stable   - stable  - Transfuse for Hb<8      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  Trace filling of the aneurysm towards the upper aspect of the stent noted.   - Can not have MRI as per family bc pt has Rt eye implant that is not MRI compatible  - CXR negative  - restarted DAPT  - home meds restarted    DCT prophylaxis: SCDs on DAPT

## 2024-12-05 ENCOUNTER — TRANSCRIPTION ENCOUNTER (OUTPATIENT)
Age: 69
End: 2024-12-05

## 2024-12-05 LAB
ANION GAP SERPL CALC-SCNC: 9 MMOL/L — SIGNIFICANT CHANGE UP (ref 5–17)
BUN SERPL-MCNC: 18.8 MG/DL — SIGNIFICANT CHANGE UP (ref 8–20)
CALCIUM SERPL-MCNC: 9.7 MG/DL — SIGNIFICANT CHANGE UP (ref 8.4–10.5)
CHLORIDE SERPL-SCNC: 109 MMOL/L — HIGH (ref 96–108)
CO2 SERPL-SCNC: 29 MMOL/L — SIGNIFICANT CHANGE UP (ref 22–29)
CREAT SERPL-MCNC: 0.68 MG/DL — SIGNIFICANT CHANGE UP (ref 0.5–1.3)
EGFR: 101 ML/MIN/1.73M2 — SIGNIFICANT CHANGE UP
GLUCOSE SERPL-MCNC: 100 MG/DL — HIGH (ref 70–99)
HCT VFR BLD CALC: 39.2 % — SIGNIFICANT CHANGE UP (ref 39–50)
HGB BLD-MCNC: 12.8 G/DL — LOW (ref 13–17)
MAGNESIUM SERPL-MCNC: 2.1 MG/DL — SIGNIFICANT CHANGE UP (ref 1.6–2.6)
MCHC RBC-ENTMCNC: 29.6 PG — SIGNIFICANT CHANGE UP (ref 27–34)
MCHC RBC-ENTMCNC: 32.7 G/DL — SIGNIFICANT CHANGE UP (ref 32–36)
MCV RBC AUTO: 90.5 FL — SIGNIFICANT CHANGE UP (ref 80–100)
PHOSPHATE SERPL-MCNC: 2.9 MG/DL — SIGNIFICANT CHANGE UP (ref 2.4–4.7)
PLATELET # BLD AUTO: 349 K/UL — SIGNIFICANT CHANGE UP (ref 150–400)
POTASSIUM SERPL-MCNC: 3.6 MMOL/L — SIGNIFICANT CHANGE UP (ref 3.5–5.3)
POTASSIUM SERPL-SCNC: 3.6 MMOL/L — SIGNIFICANT CHANGE UP (ref 3.5–5.3)
RBC # BLD: 4.33 M/UL — SIGNIFICANT CHANGE UP (ref 4.2–5.8)
RBC # FLD: 14 % — SIGNIFICANT CHANGE UP (ref 10.3–14.5)
SODIUM SERPL-SCNC: 146 MMOL/L — HIGH (ref 135–145)
WBC # BLD: 7.22 K/UL — SIGNIFICANT CHANGE UP (ref 3.8–10.5)
WBC # FLD AUTO: 7.22 K/UL — SIGNIFICANT CHANGE UP (ref 3.8–10.5)

## 2024-12-05 PROCEDURE — 99232 SBSQ HOSP IP/OBS MODERATE 35: CPT

## 2024-12-05 PROCEDURE — 99233 SBSQ HOSP IP/OBS HIGH 50: CPT

## 2024-12-05 PROCEDURE — 99497 ADVNCD CARE PLAN 30 MIN: CPT | Mod: 25

## 2024-12-05 RX ORDER — METOPROLOL TARTRATE 100 MG/1
1 TABLET, FILM COATED ORAL
Qty: 0 | Refills: 0 | DISCHARGE
Start: 2024-12-05

## 2024-12-05 RX ORDER — ACETAMINOPHEN, DIPHENHYDRAMINE HCL, PHENYLEPHRINE HCL 325; 25; 5 MG/1; MG/1; MG/1
1 TABLET ORAL
Qty: 0 | Refills: 0 | DISCHARGE
Start: 2024-12-05

## 2024-12-05 RX ORDER — METOPROLOL TARTRATE 100 MG/1
1 TABLET, FILM COATED ORAL
Refills: 0 | DISCHARGE

## 2024-12-05 RX ORDER — SERTRALINE HYDROCHLORIDE 100 MG/1
1 TABLET, FILM COATED ORAL
Refills: 0 | DISCHARGE

## 2024-12-05 RX ORDER — SODIUM CHLORIDE 9 MG/ML
1000 INJECTION, SOLUTION INTRAMUSCULAR; INTRAVENOUS; SUBCUTANEOUS
Refills: 0 | Status: DISCONTINUED | OUTPATIENT
Start: 2024-12-05 | End: 2024-12-08

## 2024-12-05 RX ORDER — OXYCODONE HYDROCHLORIDE 30 MG/1
1 TABLET ORAL
Refills: 0 | DISCHARGE

## 2024-12-05 RX ORDER — SERTRALINE HYDROCHLORIDE 100 MG/1
1 TABLET, FILM COATED ORAL
Qty: 0 | Refills: 0 | DISCHARGE
Start: 2024-12-05

## 2024-12-05 RX ORDER — MAGNESIUM, ALUMINUM HYDROXIDE 200-225/5
30 SUSPENSION, ORAL (FINAL DOSE FORM) ORAL
Qty: 0 | Refills: 0 | DISCHARGE
Start: 2024-12-05

## 2024-12-05 RX ORDER — DRONABINOL 2.5 MG
1 CAPSULE ORAL
Qty: 0 | Refills: 0 | DISCHARGE
Start: 2024-12-05

## 2024-12-05 RX ORDER — MIRTAZAPINE 15 MG/1
1 TABLET, FILM COATED ORAL
Qty: 0 | Refills: 0 | DISCHARGE
Start: 2024-12-05

## 2024-12-05 RX ORDER — ACETAMINOPHEN 500MG 500 MG/1
2 TABLET, COATED ORAL
Qty: 0 | Refills: 0 | DISCHARGE
Start: 2024-12-05

## 2024-12-05 RX ADMIN — PANTOPRAZOLE SODIUM 40 MILLIGRAM(S): 40 TABLET, DELAYED RELEASE ORAL at 12:46

## 2024-12-05 RX ADMIN — FAMOTIDINE 20 MILLIGRAM(S): 20 TABLET, FILM COATED ORAL at 17:46

## 2024-12-05 RX ADMIN — LOSARTAN POTASSIUM 25 MILLIGRAM(S): 100 TABLET, FILM COATED ORAL at 12:46

## 2024-12-05 RX ADMIN — MIRTAZAPINE 7.5 MILLIGRAM(S): 15 TABLET, FILM COATED ORAL at 12:46

## 2024-12-05 RX ADMIN — TICAGRELOR 90 MILLIGRAM(S): 90 TABLET ORAL at 17:46

## 2024-12-05 RX ADMIN — Medication 81 MILLIGRAM(S): at 12:46

## 2024-12-05 RX ADMIN — METOPROLOL TARTRATE 25 MILLIGRAM(S): 100 TABLET, FILM COATED ORAL at 12:46

## 2024-12-05 RX ADMIN — SODIUM CHLORIDE 100 MILLILITER(S): 9 INJECTION, SOLUTION INTRAMUSCULAR; INTRAVENOUS; SUBCUTANEOUS at 17:45

## 2024-12-05 RX ADMIN — SERTRALINE HYDROCHLORIDE 50 MILLIGRAM(S): 100 TABLET, FILM COATED ORAL at 12:47

## 2024-12-05 RX ADMIN — ENOXAPARIN SODIUM 40 MILLIGRAM(S): 30 INJECTION SUBCUTANEOUS at 12:50

## 2024-12-05 RX ADMIN — FAMOTIDINE 20 MILLIGRAM(S): 20 TABLET, FILM COATED ORAL at 12:46

## 2024-12-05 RX ADMIN — Medication 2.5 MILLIGRAM(S): at 17:46

## 2024-12-05 NOTE — PROGRESS NOTE ADULT - SUBJECTIVE AND OBJECTIVE BOX
SUBJECTIVE  BRIEF HOSPITAL COURSE SUMMARY: Pt is a 69yMale admitted with chief complaint of encephalopathy, dysphagia, NSTEMI type II    LAST 24 HOURS: Patient medically stable for discharge at this time.   TODAY: Patient resting comfortably in bed giving thumbs up. Patient non verbal at baseline. PAtient nods to questions, follows directions. ROS not completed due to non verbal.     OBJECTIVE  PHYSICAL EXAM:  GENERAL: no acute distress, comfortably in bed  HEAD: NC/AT  EYES: PERRLA, EOMI, Non-icteric  ENT: Mucous membranes moist, neck supple  NEURO: moving left extremities spontaneously, A&Ox0, slurred speach, 2-3 words at a time. Patient with deceits in right side with left facial droop.   PSYCH: Non fluent speech  RESP: CTAB, no wrr, non-labored breathing  CVS: RRR, no murmur appreciated  GI: Soft, non-tender, non-distended, no organomegaly, no appreciable masses, +bs all 4 quadrants  : salmeron, no suprapubic tenderness  EXTREMITIES: Nontender, no clubbing, cyanosis, or edema  SKIN: No rashes or lesions     Vital Signs Last 24 Hrs  T(C): 36.2 (05 Dec 2024 12:41), Max: 36.4 (04 Dec 2024 21:28)  T(F): 97.1 (05 Dec 2024 12:41), Max: 97.5 (04 Dec 2024 21:28)  HR: 68 (05 Dec 2024 12:41) (56 - 74)  BP: 157/96 (05 Dec 2024 12:41) (137/74 - 157/96)  BP(mean): --  RR: 18 (05 Dec 2024 12:41) (18 - 20)  SpO2: 96% (05 Dec 2024 12:41) (96% - 98%)    Parameters below as of 05 Dec 2024 12:41  Patient On (Oxygen Delivery Method): room air            MEDICATIONS  (STANDING):  aspirin  chewable 81 milliGRAM(s) Oral daily  atorvastatin 40 milliGRAM(s) Oral at bedtime  baclofen 5 milliGRAM(s) Oral every 12 hours  dronabinol 2.5 milliGRAM(s) Oral two times a day  enoxaparin Injectable 40 milliGRAM(s) SubCutaneous every 24 hours  famotidine    Tablet 20 milliGRAM(s) Oral two times a day  losartan 25 milliGRAM(s) Oral daily  metoprolol succinate ER 25 milliGRAM(s) Oral daily  mirtazapine 7.5 milliGRAM(s) Oral daily  pantoprazole    Tablet 40 milliGRAM(s) Oral before breakfast  sertraline 50 milliGRAM(s) Oral daily  sodium chloride 0.9%. 1000 milliLiter(s) (100 mL/Hr) IV Continuous <Continuous>  ticagrelor 90 milliGRAM(s) Oral every 12 hours    MEDICATIONS  (PRN):  acetaminophen     Tablet .. 650 milliGRAM(s) Oral every 6 hours PRN Temp greater or equal to 38C (100.4F), Mild Pain (1 - 3)  aluminum hydroxide/magnesium hydroxide/simethicone Suspension 30 milliLiter(s) Oral every 4 hours PRN Dyspepsia  melatonin 3 milliGRAM(s) Oral at bedtime PRN Insomnia  OLANZapine Injectable 5 milliGRAM(s) IntraMuscular every 6 hours PRN agitation  ondansetron Injectable 4 milliGRAM(s) IV Push every 8 hours PRN Nausea and/or Vomiting    Allergies    No Known Allergies    Intolerances        LABS:                        12.8   7.22  )-----------( 349      ( 05 Dec 2024 06:33 )             39.2     12-05    146[H]  |  109[H]  |  18.8  ----------------------------<  100[H]  3.6   |  29.0  |  0.68    Ca    9.7      05 Dec 2024 06:33  Phos  2.9     12-05  Mg     2.1     12-05        Urinalysis Basic - ( 05 Dec 2024 06:33 )    Color: x / Appearance: x / SG: x / pH: x  Gluc: 100 mg/dL / Ketone: x  / Bili: x / Urobili: x   Blood: x / Protein: x / Nitrite: x   Leuk Esterase: x / RBC: x / WBC x   Sq Epi: x / Non Sq Epi: x / Bacteria: x      CAPILLARY BLOOD GLUCOSE          CULTURE DATA:    Culture - Urine (collected 11-24-24 @ 14:50)  Source: Clean Catch Clean Catch (Midstream)  Final Report (11-26-24 @ 08:02):    No growth        RADIOLOGY & ADDITIONAL TESTS:

## 2024-12-05 NOTE — PROGRESS NOTE ADULT - CONVERSATION DETAILS
Pt unable to participate in meaningful complex medical decision making.   Family meeting with primary team today at 1PM with spouse and two sons (Wade and Adiel) along with medical resident Dr Osorio. We spoke about comorbidities including but not limited to suspected undiagnosed progression of underlying  dementia, likely vascular related (given CTH with chronic microvascular ischemic changes and prior strokes), progressive debility with behavioral changes since stroke in June 2024, current hospitalization and plan of care moving forward. We also spoke about the different stages of dementia and is progression towards end stage where loved one can become fully dependence for ADLs.     We expressed that its not unreasonable to pursue [facility] to determine if there is any further improvement in functional status but precaution them of potential setbacks and acute complications including but not limited to worsening debility, dysphagia with aspiration pneumonia and electrolyte abnormalities secondary to FTT or dehydration. Encourage them to have further goals of care with facility staff and family at that juncture to ensure wishes are in line with what patient would have wanted if he could participate in decision making including hospice program if his functional status worsens or has a worsening overall quality of life. Family expressed frustration with care patient was receiving at [facility] and requesting 24 hours to speak with [facility] staff before patient is discharged back to ensure everyone is on the same page. Primary team to discuss with unit SW/CCM regarding dispo planning. Psychosocial support provided and all questions answered.

## 2024-12-05 NOTE — DISCHARGE NOTE PROVIDER - ATTENDING DISCHARGE PHYSICAL EXAMINATION:
Vital Signs Last 24 Hrs  T(C): 36.3 (10 Dec 2024 09:16), Max: 36.9 (09 Dec 2024 17:10)  T(F): 97.3 (10 Dec 2024 09:16), Max: 98.5 (09 Dec 2024 20:00)  HR: 57 (10 Dec 2024 09:16) (57 - 70)  BP: 149/79 (10 Dec 2024 09:16) (146/79 - 183/78)  BP(mean): 106 (09 Dec 2024 23:44) (106 - 106)  RR: 18 (10 Dec 2024 09:16) (18 - 18)  SpO2: 99% (10 Dec 2024 09:16) (94% - 99%)    Parameters below as of 10 Dec 2024 09:16  Patient On (Oxygen Delivery Method): room air    GENERAL: no acute distress, comfortably in bed  HEAD: NC/AT  EYES: PERRLA, EOMI, Non-icteric  ENT: Mucous membranes moist, neck supple  NEURO: moving left extremities spontaneously, A&Ox0. atient with deceits in right side with left facial droop.   PSYCH: Non fluent speech  RESP: CTAB, no wrr, non-labored breathing  CVS: RRR, no murmur appreciated  GI: Soft, non-tender, non-distended, no organomegaly, no appreciable masses, +bs all 4 quadrants  : condom catheter  EXTREMITIES: Nontender, no clubbing, cyanosis, or edema  SKIN: No rashes or lesions

## 2024-12-05 NOTE — DISCHARGE NOTE PROVIDER - DETAILS OF MALNUTRITION DIAGNOSIS/DIAGNOSES
This patient has been assessed with a concern for Malnutrition and was treated during this hospitalization for the following Nutrition diagnosis/diagnoses:     -  11/30/2024: Severe protein-calorie malnutrition

## 2024-12-05 NOTE — PROGRESS NOTE ADULT - ASSESSMENT
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  Pt reported by family to have continued decline in mentation since stroke but significantly agitated the past few days.  VS stable overall but had 1 short episode of desaturation reportedly in to 70's but quickly resolved and currenly satting >94% ORA.  Clinically appears dehydrated, is altered, restless and only intermittently follows simple commands but not answering questions appropriately.  Initial w/u significant for Hb 12.1, trops 56-->53, EKG w/ TWI in V2-V6.  CT maxillofacial significant for acute nondisplaced bilateral nasal bone fractures.  CTH and Cspine negative.  CTA neck and brain negative for acute findings.  s/p zyprexa for restlessness/agitaiton in ED.  Cardiology consulted.  Will admit for type II MI and mechanical fall.     Failure to thrive.   -Patient cleared for modthick liquids and puree diet by speech and swallow  -Patient can mechanically handle diet but does not have motivation  -calorie count ordered  -meeting with family and palliative tomorrow at 1300.     Type II MI, possibley demand ischemia   - Trops 56-->53 --> 51 --> 50  - EKG shows V2-V6 t wave inversion   - TTE shows EF of 54% with no wall motion abnormalities and grade 1 dystolic dysfunction. Previous Echo in 7.24 with EF of 74 %  - Check lipid panel and A1c   - restarted home cardiac meds   - Monitor on telemetry   - Cardiology on board - repeat TTE    Hypernatremia  -Na: 146 on 12/14/24  -encouraged oral hydration.     Bradycardia  -patient bradycardic overnight  -EKG similar to previous  -continue metoprolol      Mechanical fall w/ subsequent nasal bone fractures  - CT reviewed  - PT  - Analgesics as needed   - Fall and aspiration precautions       Acute metabolic encephalopathy likely 2/2 Vascular Dementia  - Suspect part of decline in mental status related to recent stroke/aneurysm repair  - Per family pts mental status has been declining over the past few weeks but significantly worse recently   - No evidence of acute infection and UA negative for UTI   - Pt s/p fall w/ head trauma and initial CTH negative for ICH   - CXR w/out consolidation/infiltrate   - Dehydrated on exam which can contribute to delirium therefore will give gentle IVFs   - Concern for dysphagia given mental status  - puree diet  - Fall and aspiration precautions   - PRN IM zyprexa if needed for severe agitation but reorient when possible and can consider starting depakote IV as well if remains very agitated   - Would avoid benzo's if possible   - adhere to strict hospital delirium precautions, escalate admission to room  - Once cleared for diet resume home sertraline  -  consultation      Hypokalemia  - resolved      Mild normocytic anemia  - Baseline H/H unknown   - Reported to be on 3 blood thinners as per family but awaiting med rec from [place] to be fax'ed over to confirm (per  first pt is on ASA and brillinta)  - Dry blood in and around nares noted   - Hemodynamically stable   - stable  - Transfuse for Hb<8      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  Trace filling of the aneurysm towards the upper aspect of the stent noted.   - Can not have MRI as per family bc pt has Rt eye implant that is not MRI compatible  - CXR negative  - restarted DAPT  - home meds restarted    DCT prophylaxis: SCDs on DAPT

## 2024-12-05 NOTE — PROGRESS NOTE ADULT - ATTENDING COMMENTS
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 after fall earlier this morning.  Pt reported by family to have continued decline in mentation since stroke but significantly agitated the past few days.  CT maxillofacial significant for acute nondisplaced bilateral nasal bone fractures.  CTH and Cspine negative.  CTA neck and brain negative for acute findings. also with elevated trops / likely demand ischemia. patient now admitted with failure to thrive, dec po intake, dehydration. started on mirtazipine , marinol. patient remains full code. ongoing GOC discussion with family.       failure to thrive   Acute metabolic encephalopathy likely 2/2 Vascular Dementia/ progressive / advanced   Type II MI, possibly demand ischemia   Hypernatremia  Bradycardia  Mechanical fall w/ subsequent nasal bone fractures  dysphagia   Mild normocytic anemia/ likely chronic       - c/w mirtazapine, marinol   -GOC with palliative this afternoon  Cont IVF today and likely plan for DC to NH Friday

## 2024-12-05 NOTE — PROGRESS NOTE ADULT - ASSESSMENT
69 year old male with hx of HTN, Rt eye implant (not MR compatible), recent hospital admission at [facility] (6/14-7/17/24) for stroke and partially thrombosed fusiform basilar artery aneurysm s/p endovascular intervention with PED complicated by post-op PED occlusion requiring thrombolysis, with residual right hemiparesis, cerebral dysfunction, right hemiparesis and now nonverbal and w/ behavioral disturbances admitted on 11/24 from [facility] for recurrent fall sustaining acute nasal bone fractures, acute hyperactive delirium, dysphagia and NSTEMI. Palliative consulted for support and to assist with ongoing goals of care.     #Acute Hyperactive Delirium  #Hx of Strokes (6/2024)  #Suspect Underlying Dementia - Likely Vascular Related  - imaging reviewed  - CT head noted with chronic microvascular ischemic changes and prior strokes  - agitation improved since admission with Seroquel  - supportive care, reorientation, sleep hygiene  - avoid/minimize deliriogenic drugs such as benzodiazepines and anticholinergics      # Progressive Debility  - PT eval noted, recommend return back to [facility]  - fall precaution    #Progressive Dysphagia  - s/p MBS on modified diet with pureed, moderately thick via tspn or cup  - aspiration precaution  - pt requires assist with feeding and needs significant prompting      Palliative Care Encounter   - surrogate is spouse who makes decisions with her adult children  - Family meeting today with primary team, see goals of care above  - GOC: no limitation to intervention, FULL CODE, plan is for [facility] when medically optimized   - Recommend family to have ongoing goals of care based on his progress at rehab and advance care planning for down the road  - Overall guarded prognosis at best with high risk factors for rehosptializations  - Dispo planning per unit SW/CCM    No further recommendations from a palliative standpoint. Will sign off. Please reconsult PRN if goals of care should change and assistance needed in further collaborative family discussions. Dispo planning per CCC. Ongoing medical mgnt per primary team.

## 2024-12-05 NOTE — DISCHARGE NOTE PROVIDER - NSDCMRMEDTOKEN_GEN_ALL_CORE_FT
acetaminophen 325 mg oral tablet: 2 tab(s) orally every 6 hours As needed Temp greater or equal to 38C (100.4F), Mild Pain (1 - 3)  aluminum hydroxide-magnesium hydroxide 200 mg-200 mg/5 mL oral suspension: 30 milliliter(s) orally every 4 hours As needed Dyspepsia  aspirin 81 mg oral capsule: 1 cap(s) orally once a day  baclofen 5 mg oral tablet: 1 tab(s) orally 2 times a day  Brilinta (ticagrelor) 90 mg oral tablet: 1 tab(s) orally 2 times a day  Lipitor 40 mg oral tablet: 1 tab(s) orally once a day (at bedtime)  losartan 25 mg oral tablet: 1 tab(s) orally once a day  Marinol 2.5 mg oral capsule: 1 cap(s) orally 2 times a day  melatonin 3 mg oral tablet: 1 tab(s) orally once a day (at bedtime) As needed Insomnia  metoprolol succinate 25 mg oral tablet, extended release: 1 tab(s) orally once a day  mirtazapine 7.5 mg oral tablet: 1 tab(s) orally once a day  Norvasc 5 mg oral tablet: 1 tab(s) orally once a day  pantoprazole 40 mg oral delayed release tablet: 1 tab(s) orally once a day  Pepcid 20 mg oral tablet: 1 tab(s) orally 2 times a day  sertraline 50 mg oral tablet: 1 tab(s) orally once a day   acetaminophen 325 mg oral tablet: 2 tab(s) orally every 6 hours As needed Temp greater or equal to 38C (100.4F), Mild Pain (1 - 3)  aluminum hydroxide-magnesium hydroxide 200 mg-200 mg/5 mL oral suspension: 30 milliliter(s) orally every 4 hours As needed Dyspepsia  aspirin 81 mg oral capsule: 1 cap(s) orally once a day  baclofen 5 mg oral tablet: 1 tab(s) orally 2 times a day  Brilinta (ticagrelor) 90 mg oral tablet: 1 tab(s) orally 2 times a day  Lipitor 40 mg oral tablet: 1 tab(s) orally once a day (at bedtime)  losartan 25 mg oral tablet: 1 tab(s) orally once a day  Marinol 2.5 mg oral capsule: 1 cap(s) orally 2 times a day  melatonin 3 mg oral tablet: 1 tab(s) orally once a day (at bedtime) As needed Insomnia  metoprolol succinate 25 mg oral tablet, extended release: 1 tab(s) orally once a day  mirtazapine 7.5 mg oral tablet: 1 tab(s) orally once a day  pantoprazole 40 mg oral delayed release tablet: 1 tab(s) orally once a day  Pepcid 20 mg oral tablet: 1 tab(s) orally 2 times a day  sertraline 50 mg oral tablet: 1 tab(s) orally once a day   acetaminophen 325 mg oral tablet: 2 tab(s) orally every 6 hours As needed Temp greater or equal to 38C (100.4F), Mild Pain (1 - 3)  aluminum hydroxide-magnesium hydroxide 200 mg-200 mg/5 mL oral suspension: 30 milliliter(s) orally every 4 hours As needed Dyspepsia  aspirin 81 mg oral capsule: 1 cap(s) orally once a day  baclofen 5 mg oral tablet: 1 tab(s) orally 2 times a day  Brilinta (ticagrelor) 90 mg oral tablet: 1 tab(s) orally 2 times a day  Lipitor 40 mg oral tablet: 1 tab(s) orally once a day (at bedtime)  losartan 25 mg oral tablet: 1 tab(s) orally once a day  Marinol 2.5 mg oral capsule: 1 cap(s) orally 2 times a day  melatonin 3 mg oral tablet: 1 tab(s) orally once a day (at bedtime) As needed Insomnia  metoprolol succinate 25 mg oral tablet, extended release: 1 tab(s) orally once a day  mirtazapine 7.5 mg oral tablet: 1 tab(s) orally once a day  pantoprazole 40 mg oral delayed release tablet: 1 tab(s) orally once a day  Pepcid 20 mg oral tablet: 1 tab(s) orally 2 times a day  QUEtiapine 25 mg oral tablet: 1 tab(s) orally once a day (at bedtime)  sertraline 50 mg oral tablet: 1 tab(s) orally once a day   acetaminophen 325 mg oral tablet: 2 tab(s) orally every 6 hours As needed Temp greater or equal to 38C (100.4F), Mild Pain (1 - 3)  aluminum hydroxide-magnesium hydroxide 200 mg-200 mg/5 mL oral suspension: 30 milliliter(s) orally every 4 hours As needed Dyspepsia  aspirin 81 mg oral capsule: 1 cap(s) orally once a day  baclofen 5 mg oral tablet: 1 tab(s) orally 2 times a day  Brilinta (ticagrelor) 90 mg oral tablet: 1 tab(s) orally 2 times a day  cephalexin 500 mg oral capsule: 1 cap(s) orally 4 times a day  Lipitor 40 mg oral tablet: 1 tab(s) orally once a day (at bedtime)  losartan 25 mg oral tablet: 1 tab(s) orally once a day  Marinol 2.5 mg oral capsule: 1 cap(s) orally 2 times a day  melatonin 3 mg oral tablet: 1 tab(s) orally once a day (at bedtime) As needed Insomnia  metoprolol succinate 25 mg oral tablet, extended release: 1 tab(s) orally once a day  mirtazapine 7.5 mg oral tablet: 1 tab(s) orally once a day  pantoprazole 40 mg oral delayed release tablet: 1 tab(s) orally once a day  Pepcid 20 mg oral tablet: 1 tab(s) orally 2 times a day  QUEtiapine 25 mg oral tablet: 1 tab(s) orally once a day (at bedtime)  sertraline 50 mg oral tablet: 1 tab(s) orally once a day

## 2024-12-05 NOTE — DISCHARGE NOTE PROVIDER - HOSPITAL COURSE
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  Pt is a poor historian but as per family he has had worsening decine in mentation since stroke and significantly agitated and impulsive the past few days.  He was restless trying to get out of wheelchair yesterday and fell landing on his face/head and was subsequently sent to hospital for evaluation. Patient found to have elevated troponin at admissio. EKG showed no acute pathology. Repeat troponins trended down. Patient CTH was negative but due to history fo stroke continued to hold anticoagulation, until repeat CT was preformed which showed no rebleeding. DAPT was restarted. Patient started on zyprexa PRN and seroquel and patient agitation improved. Per family patient returned to behavior when first in rehab. Patient was started on pureed diet but had continued dysphagia with liquids. Speech and swallow was consulted and a Modified barium swallow was preformed which showed no mechanical problems with swallowing patient started on moderately thick liquids. Staff encouraged patient to eat and a calorie count was implemented to try to encourage feeds. However, patient continued to lose weight due to lack of oral intake. This was likely due to underlying vascular dementia in the setting of basilar artery aneurysm and failed repair. Family agreed to continue with rehab and see if patient's contrition improves.   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  Pt is a poor historian but as per family he has had worsening decine in mentation since stroke and significantly agitated and impulsive the past few days.  He was restless trying to get out of wheelchair and fell landing on his face/head and was subsequently sent to hospital for evaluation. Patient found to have elevated troponin at admissio. EKG showed no acute pathology. Repeat troponins trended down. Patient CTH was negative but due to history fo stroke continued to hold anticoagulation, until repeat CT was preformed which showed no rebleeding. DAPT was restarted. Patient started on zyprexa PRN and seroquel and patient agitation improved. Per family patient returned to behavior when first in rehab. Patient was started on pureed diet but had continued dysphagia with liquids. Speech and swallow was consulted and a Modified barium swallow was preformed which showed no mechanical problems with swallowing patient started on moderately thick liquids. Staff encouraged patient to eat and a calorie count was implemented to try to encourage feeds. However, patient continued to lose weight due to lack of oral intake. This was likely due to underlying vascular dementia in the setting of basilar artery aneurysm and failed repair. Family agreed to continue with rehab and see if patient's contrition improves. Pt is a 69yMale with a PMH of Rt eye implant (no MR compatible), [facility] admission at [facility] for stroke, paritally thrombosed fusiform basilar artery aneurysm w/ dolichoectasia of the basilar artery s/p endovascular reconstruction of dolichoectatic basilar artery aneurysm w/ PED x3 complicated by PED occlusion. Patient with right sided paralysis presenting from [facility] after fall. Patient had worsening decline in mentation since stroke and significantly agitated and impulsive few days prior to admission. Patient CT head was negative and treated for failure to thrive due to underlying vascular dementia. Patient is stable for back to [facility].     #Acute metabolic encephalopathy likely 2/2 Vascular Dementia  #Failure to thrive.   #left arm cool to touch  #Type II MI, possibly demand ischemia, resolved  #Hypernatremia,  #Bradycardia  #Mechanical fall w/ subsequent nasal bone fractures  #Mild normocytic anemia, resolved  #Partially thrombosed fusiform basilar artery aneurysm w/ dolichoectasia of the basilar artery s/p endovascular reconstruction of dolichoectatic basilar artery aneurysm w/ PED x 3 (6/19/24 Dr Cabrera; on ASA/Brilinta) complicated by PED occlusion post procedure requiring angiogram w/ intra-arterial thrombolysis w/ IA Integrilin to PED thrombus 6/19/24

## 2024-12-05 NOTE — PROGRESS NOTE ADULT - SUBJECTIVE AND OBJECTIVE BOX
[facility] PALLIATIVE MEDICINE     CC: FOLLOW UP VISIT + GOC    INTERVAL HPI/OVERNIGHT EVENTS: No acute events overnight. Pt seen earlier this AM and again in the afternoon when family present. Family meeting with primary team at 1PM.    Source if other than patient:  []Family   [x]Team       PRESENT SYMPTOMS:     Dyspnea: no  Nausea/Vomiting:  No  Anxiety:   No  Depression: unable to assess  Fatigue:  No  Loss of appetite: Yes    Constipation:  No    Pain: No overt sign of distress            Character-            Duration-            Effect-            Factors-            Frequency-            Location-            Severity-    Pain AD Score:  [email or web address] (press ctrl + left click to view)    Review of Systems: Limited due to mentation/ dementia, nonverbal status    MEDICATIONS  (STANDING):  aspirin  chewable 81 milliGRAM(s) Oral daily  atorvastatin 40 milliGRAM(s) Oral at bedtime  baclofen 5 milliGRAM(s) Oral every 12 hours  dronabinol 2.5 milliGRAM(s) Oral two times a day  enoxaparin Injectable 40 milliGRAM(s) SubCutaneous every 24 hours  famotidine    Tablet 20 milliGRAM(s) Oral two times a day  losartan 25 milliGRAM(s) Oral daily  metoprolol succinate ER 25 milliGRAM(s) Oral daily  mirtazapine 7.5 milliGRAM(s) Oral daily  pantoprazole    Tablet 40 milliGRAM(s) Oral before breakfast  sertraline 50 milliGRAM(s) Oral daily  sodium chloride 0.9%. 1000 milliLiter(s) (100 mL/Hr) IV Continuous <Continuous>  sodium chloride 0.9%. 1000 milliLiter(s) (100 mL/Hr) IV Continuous <Continuous>  ticagrelor 90 milliGRAM(s) Oral every 12 hours    MEDICATIONS  (PRN):  acetaminophen     Tablet .. 650 milliGRAM(s) Oral every 6 hours PRN Temp greater or equal to 38C (100.4F), Mild Pain (1 - 3)  aluminum hydroxide/magnesium hydroxide/simethicone Suspension 30 milliLiter(s) Oral every 4 hours PRN Dyspepsia  melatonin 3 milliGRAM(s) Oral at bedtime PRN Insomnia  OLANZapine Injectable 5 milliGRAM(s) IntraMuscular every 6 hours PRN agitation  ondansetron Injectable 4 milliGRAM(s) IV Push every 8 hours PRN Nausea and/or Vomiting      PHYSICAL EXAM:    Vital Signs Last 24 Hrs  T(C): 36.2 (05 Dec 2024 12:41), Max: 36.4 (04 Dec 2024 21:28)  T(F): 97.1 (05 Dec 2024 12:41), Max: 97.5 (04 Dec 2024 21:28)  HR: 68 (05 Dec 2024 12:41) (56 - 74)  BP: 157/96 (05 Dec 2024 12:41) (137/74 - 157/96)  BP(mean): --  RR: 18 (05 Dec 2024 12:41) (18 - 20)  SpO2: 96% (05 Dec 2024 12:41) (96% - 98%)    Parameters below as of 05 Dec 2024 12:41  Patient On (Oxygen Delivery Method): room air    Karnofsky: 50 %    General: resting comfortably.    HEENT: mmm     Lungs: comfortable nonlabored    CV:  rrr  GI: +BS abdomen soft, NTND   MSK:   no cyanosis or edema,  +weakness     Neuro: nonfocal. wakes to verbal stimuli but mostly nonverbal. follows simple commands by nodding yes/no. chronic right hemiparesis   Skin: warm and dry.      LABS: Reviewed                          12.8   7.22  )-----------( 349      ( 05 Dec 2024 06:33 )             39.2     12-05    146[H]  |  109[H]  |  18.8  ----------------------------<  100[H]  3.6   |  29.0  |  0.68    Ca    9.7      05 Dec 2024 06:33  Phos  2.9     12-05  Mg     2.1     12-05        Urinalysis Basic - ( 05 Dec 2024 06:33 )    Color: x / Appearance: x / SG: x / pH: x  Gluc: 100 mg/dL / Ketone: x  / Bili: x / Urobili: x   Blood: x / Protein: x / Nitrite: x   Leuk Esterase: x / RBC: x / WBC x   Sq Epi: x / Non Sq Epi: x / Bacteria: x      I&O's Summary    04 Dec 2024 07:01  -  05 Dec 2024 07:00  --------------------------------------------------------  IN: 500 mL / OUT: 0 mL / NET: 500 mL    RADIOLOGY & ADDITIONAL STUDIES: Reviewed     ADVANCE DIRECTIVES/TREATMENT PREFERENCES: FULL CODE    NEUROLOGICAL MEDICATIONS/OPIOIDS/BENZODIAZEPINE IN PAST 24 HOURS:    baclofen   5 milliGRAM(s) Oral (12-04-24 @ 21:28)    dronabinol   2.5 milliGRAM(s) Oral (12-04-24 @ 18:12)    mirtazapine   7.5 milliGRAM(s) Oral (12-05-24 @ 12:46)   7.5 milliGRAM(s) Oral (12-04-24 @ 18:12)    sertraline   50 milliGRAM(s) Oral (12-05-24 @ 12:47)   50 milliGRAM(s) Oral (12-04-24 @ 18:12)

## 2024-12-05 NOTE — DISCHARGE NOTE PROVIDER - NSDCCPCAREPLAN_GEN_ALL_CORE_FT
PRINCIPAL DISCHARGE DIAGNOSIS  Diagnosis: NSTEMI (non-ST elevation myocardial infarction)  Assessment and Plan of Treatment: Patient shown to have eleated troponin on admission likelyd ue to stress of fall. Patient EKG showed no acute pathology. Echo showed no wall motion abnormalities. Patient clear from cardiology point of view.      SECONDARY DISCHARGE DIAGNOSES  Diagnosis: Nasal bone fracture  Assessment and Plan of Treatment: Nasal fractures were found on Ct scan patient nose was bandaged, patient seems to be in no acute pain upon discharge.    Diagnosis: Adult failure to thrive  Assessment and Plan of Treatment: Patient needs assistance with eating due to poor mortivation. Patient has waxing and wanning interest in PO intake. Patient needs to be encouraged to eat. Likely this is due to unerlying vascular dementia in the setting of multiple stroke and failed repairs.    Diagnosis: Hypernatremia  Assessment and Plan of Treatment: Patient salt was elevated during hospital stay. Patient was given fluid and issue resolved. Patient should recieved IV hydration when clinically dehydrated in rehab.    Diagnosis: Presenile dementia with delirium, without behavioral disturbance  Assessment and Plan of Treatment: Patient is suspected to have vascular dementia due to stroke and multiple repairs with complications. Patient presenting with aggressive behavior. Patient behavior controlled with seroquel, prn zyprexa, mirtazapine. Recommend against benzos and opiods as it likely precipitates worsening delirium.     PRINCIPAL DISCHARGE DIAGNOSIS  Diagnosis: NSTEMI (non-ST elevation myocardial infarction)  Assessment and Plan of Treatment: Patient shown to have eleated troponin on admission likelyd ue to stress of fall. Patient EKG showed no acute pathology. Echo showed no wall motion abnormalities. Patient clear from cardiology point of view.      SECONDARY DISCHARGE DIAGNOSES  Diagnosis: Nasal bone fracture  Assessment and Plan of Treatment: #Mechanical fall w/ subsequent nasal bone fractures  Nasal fractures were found on Ct scan patient nose was bandaged, patient seems to be in no acute pain upon discharge.    Diagnosis: Adult failure to thrive  Assessment and Plan of Treatment: Patient needs assistance with eating due to poor mortivation. Patient has waxing and wanning interest in PO intake. Patient needs to be encouraged to eat. Likely this is due to unerlying vascular dementia in the setting of multiple stroke and failed repairs.    Diagnosis: Hypernatremia  Assessment and Plan of Treatment: Patient salt was elevated during hospital stay. Patient was given fluid and issue resolved. Patient should recieved IV hydration when clinically dehydrated in rehab.    Diagnosis: Presenile dementia with delirium, without behavioral disturbance  Assessment and Plan of Treatment: Patient is suspected to have vascular dementia due to stroke and multiple repairs with complications. Patient presenting with aggressive behavior. Patient behavior controlled with seroquel, prn zyprexa, mirtazapine. Recommend against benzos and opiods as it likely precipitates worsening delirium.  # left arm cool to touch  -US arterial and venous benign    Diagnosis: Bradycardia  Assessment and Plan of Treatment: -EKG similar to previous  -continue metoprolol    Diagnosis: Acute metabolic encephalopathy  Assessment and Plan of Treatment: #Acute metabolic encephalopathy likely 2/2 Vascular Dementia     PRINCIPAL DISCHARGE DIAGNOSIS  Diagnosis: NSTEMI (non-ST elevation myocardial infarction)  Assessment and Plan of Treatment: Patient shown to have eleated troponin on admission likelyd ue to stress of fall. Patient EKG showed no acute pathology. Echo showed no wall motion abnormalities. Patient clear from cardiology point of view.      SECONDARY DISCHARGE DIAGNOSES  Diagnosis: Nasal bone fracture  Assessment and Plan of Treatment: #Mechanical fall w/ subsequent nasal bone fractures  Nasal fractures were found on Ct scan patient nose was bandaged, patient seems to be in no acute pain upon discharge.    Diagnosis: Adult failure to thrive  Assessment and Plan of Treatment: Patient needs assistance with eating due to poor mortivation. Patient has waxing and wanning interest in PO intake. Patient needs to be encouraged to eat. Likely this is due to unerlying vascular dementia in the setting of multiple stroke and failed repairs.    Diagnosis: Hypernatremia  Assessment and Plan of Treatment: Patient salt was elevated during hospital stay. Patient was given fluid and issue resolved. Patient should recieved IV hydration when clinically dehydrated in rehab.    Diagnosis: Presenile dementia with delirium, without behavioral disturbance  Assessment and Plan of Treatment: Patient is suspected to have vascular dementia due to stroke and multiple repairs with complications. Patient presenting with aggressive behavior. Patient behavior controlled with seroquel, prn zyprexa, mirtazapine. Recommend against benzos and opiods as it likely precipitates worsening delirium.  # left arm cool to touch  -US arterial and venous benign    Diagnosis: Bradycardia  Assessment and Plan of Treatment: -EKG similar to previous  -continue metoprolol    Diagnosis: Acute metabolic encephalopathy  Assessment and Plan of Treatment: #Acute metabolic encephalopathy likely 2/2 Vascular Dementia    Diagnosis: Anemia  Assessment and Plan of Treatment: resolved     PRINCIPAL DISCHARGE DIAGNOSIS  Diagnosis: NSTEMI (non-ST elevation myocardial infarction)  Assessment and Plan of Treatment: Patient shown to have eleated troponin on admission likelyd ue to stress of fall. Patient EKG showed no acute pathology. Echo showed no wall motion abnormalities. Patient clear from cardiology point of view.      SECONDARY DISCHARGE DIAGNOSES  Diagnosis: Nasal bone fracture  Assessment and Plan of Treatment: #Mechanical fall w/ subsequent nasal bone fractures  Nasal fractures were found on Ct scan patient nose was bandaged, patient seems to be in no acute pain upon discharge.    Diagnosis: Adult failure to thrive  Assessment and Plan of Treatment: Patient needs assistance with eating due to poor mortivation. Patient has waxing and wanning interest in PO intake. Patient needs to be encouraged to eat. Likely this is due to unerlying vascular dementia in the setting of multiple stroke and failed repairs.    Diagnosis: Hypernatremia  Assessment and Plan of Treatment: Patient salt was elevated during hospital stay. Patient was given fluid and issue resolved. Patient should recieved IV hydration when clinically dehydrated in rehab.    Diagnosis: Presenile dementia with delirium, without behavioral disturbance  Assessment and Plan of Treatment: Patient is suspected to have vascular dementia due to stroke and multiple repairs with complications. Patient presenting with aggressive behavior. Patient behavior controlled with seroquel, prn zyprexa, mirtazapine. Recommend against benzos and opiods as it likely precipitates worsening delirium.  # left arm cool to touch  -US arterial and venous benign    Diagnosis: Bradycardia  Assessment and Plan of Treatment: -EKG similar to previous  -continue metoprolol    Diagnosis: Acute metabolic encephalopathy  Assessment and Plan of Treatment: #Acute metabolic encephalopathy likely 2/2 Vascular Dementia    Diagnosis: Anemia  Assessment and Plan of Treatment: resolved    Diagnosis: Phlebitis of left arm  Assessment and Plan of Treatment: Cont Keflex x 5 days end date 12/16/24

## 2024-12-06 LAB
ANION GAP SERPL CALC-SCNC: 13 MMOL/L — SIGNIFICANT CHANGE UP (ref 5–17)
BUN SERPL-MCNC: 17.1 MG/DL — SIGNIFICANT CHANGE UP (ref 8–20)
CALCIUM SERPL-MCNC: 9.3 MG/DL — SIGNIFICANT CHANGE UP (ref 8.4–10.5)
CHLORIDE SERPL-SCNC: 109 MMOL/L — HIGH (ref 96–108)
CO2 SERPL-SCNC: 23 MMOL/L — SIGNIFICANT CHANGE UP (ref 22–29)
CREAT SERPL-MCNC: 0.54 MG/DL — SIGNIFICANT CHANGE UP (ref 0.5–1.3)
EGFR: 109 ML/MIN/1.73M2 — SIGNIFICANT CHANGE UP
GLUCOSE SERPL-MCNC: 94 MG/DL — SIGNIFICANT CHANGE UP (ref 70–99)
HCT VFR BLD CALC: 38.4 % — LOW (ref 39–50)
HGB BLD-MCNC: 12.4 G/DL — LOW (ref 13–17)
MAGNESIUM SERPL-MCNC: 2.2 MG/DL — SIGNIFICANT CHANGE UP (ref 1.8–2.6)
MCHC RBC-ENTMCNC: 29.7 PG — SIGNIFICANT CHANGE UP (ref 27–34)
MCHC RBC-ENTMCNC: 32.3 G/DL — SIGNIFICANT CHANGE UP (ref 32–36)
MCV RBC AUTO: 92.1 FL — SIGNIFICANT CHANGE UP (ref 80–100)
PHOSPHATE SERPL-MCNC: 3.2 MG/DL — SIGNIFICANT CHANGE UP (ref 2.4–4.7)
PLATELET # BLD AUTO: 308 K/UL — SIGNIFICANT CHANGE UP (ref 150–400)
POTASSIUM SERPL-MCNC: 3.7 MMOL/L — SIGNIFICANT CHANGE UP (ref 3.5–5.3)
POTASSIUM SERPL-SCNC: 3.7 MMOL/L — SIGNIFICANT CHANGE UP (ref 3.5–5.3)
RBC # BLD: 4.17 M/UL — LOW (ref 4.2–5.8)
RBC # FLD: 14 % — SIGNIFICANT CHANGE UP (ref 10.3–14.5)
SODIUM SERPL-SCNC: 145 MMOL/L — SIGNIFICANT CHANGE UP (ref 135–145)
WBC # BLD: 7.13 K/UL — SIGNIFICANT CHANGE UP (ref 3.8–10.5)
WBC # FLD AUTO: 7.13 K/UL — SIGNIFICANT CHANGE UP (ref 3.8–10.5)

## 2024-12-06 PROCEDURE — 99233 SBSQ HOSP IP/OBS HIGH 50: CPT | Mod: GC

## 2024-12-06 RX ADMIN — SERTRALINE HYDROCHLORIDE 50 MILLIGRAM(S): 100 TABLET, FILM COATED ORAL at 13:37

## 2024-12-06 RX ADMIN — TICAGRELOR 90 MILLIGRAM(S): 90 TABLET ORAL at 17:21

## 2024-12-06 RX ADMIN — FAMOTIDINE 20 MILLIGRAM(S): 20 TABLET, FILM COATED ORAL at 17:21

## 2024-12-06 RX ADMIN — MIRTAZAPINE 7.5 MILLIGRAM(S): 15 TABLET, FILM COATED ORAL at 13:37

## 2024-12-06 RX ADMIN — ENOXAPARIN SODIUM 40 MILLIGRAM(S): 30 INJECTION SUBCUTANEOUS at 13:31

## 2024-12-06 RX ADMIN — Medication 2.5 MILLIGRAM(S): at 17:21

## 2024-12-06 RX ADMIN — METOPROLOL TARTRATE 25 MILLIGRAM(S): 100 TABLET, FILM COATED ORAL at 12:38

## 2024-12-06 RX ADMIN — LOSARTAN POTASSIUM 25 MILLIGRAM(S): 100 TABLET, FILM COATED ORAL at 13:38

## 2024-12-06 RX ADMIN — Medication 81 MILLIGRAM(S): at 13:38

## 2024-12-06 RX ADMIN — PANTOPRAZOLE SODIUM 40 MILLIGRAM(S): 40 TABLET, DELAYED RELEASE ORAL at 12:39

## 2024-12-06 RX ADMIN — Medication 5 MILLIGRAM(S): at 12:32

## 2024-12-06 NOTE — PROGRESS NOTE ADULT - ATTENDING COMMENTS
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  Pt reported by family to have continued decline in mentation since stroke but significantly agitated the past few days.  CT maxillofacial significant for acute nondisplaced bilateral nasal bone fractures.  CTH and Cspine negative.  CTA neck and brain negative for acute findings. also with elevated trops / likely demand ischemia. patient now admitted with failure to thrive, dec po intake, dehydration. started on mirtazipine , marinol. patient remains full code. GOC discussed with family       failure to thrive   Acute metabolic encephalopathy likely 2/2 Vascular Dementia/ progressive / advanced   Type II MI, possibly demand ischemia   Hypernatremia  Bradycardia  Mechanical fall w/ subsequent nasal bone fractures  dysphagia   Mild normocytic anemia/ likely chronic       - c/w mirtazapine, marinol   - c/w remainder current meds   - encourage po intake   - medically stable for dc to [facility] , needs re auth , sw/ cm for dc planning

## 2024-12-06 NOTE — PROGRESS NOTE ADULT - SUBJECTIVE AND OBJECTIVE BOX
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 Patient with right sided paralysis presenting from [facility] after fall. Patient had worsening decline in mentation since stroke and significantly agitated and impulsive few days prior to admission. Patient CT head was negative and treated for failure to thrive due to underlying vascular dementia. Patient stable for discharge.     LAST 24 HOURS:  TODAY:  Patient non verbal at baseline with right sided deficit. Patient nodding to questions does not appear to be in distress. ROS not completed due to patient being non verbal.   OBJECTIVE  PHYSICAL EXAM:  GENERAL: no acute distress, comfortably in bed  HEAD: NC/AT  EYES: PERRLA, EOMI, Non-icteric  ENT: Mucous membranes moist, neck supple  NEURO: moving left extremities spontaneously, A&Ox0, slurred speach, 2-3 words at a time. Patient with deceits in right side with left facial droop.   PSYCH: Non fluent speech  RESP: CTAB, no wrr, non-labored breathing  CVS: RRR, no murmur appreciated  GI: Soft, non-tender, non-distended, no organomegaly, no appreciable masses, +bs all 4 quadrants  : salmeron, no suprapubic tenderness  EXTREMITIES: Nontender, no clubbing, cyanosis, or edema  SKIN: No rashes or lesions     Vital Signs Last 24 Hrs  T(C): 36.5 (06 Dec 2024 09:17), Max: 36.5 (06 Dec 2024 09:17)  T(F): 97.7 (06 Dec 2024 09:17), Max: 97.7 (06 Dec 2024 09:17)  HR: 55 (06 Dec 2024 09:17) (52 - 60)  BP: 179/85 (06 Dec 2024 09:17) (148/86 - 179/85)  BP(mean): --  RR: 18 (06 Dec 2024 09:17) (18 - 18)  SpO2: 99% (06 Dec 2024 09:17) (97% - 99%)    Parameters below as of 06 Dec 2024 09:17  Patient On (Oxygen Delivery Method): room air            MEDICATIONS  (STANDING):  aspirin  chewable 81 milliGRAM(s) Oral daily  atorvastatin 40 milliGRAM(s) Oral at bedtime  baclofen 5 milliGRAM(s) Oral every 12 hours  dronabinol 2.5 milliGRAM(s) Oral two times a day  enoxaparin Injectable 40 milliGRAM(s) SubCutaneous every 24 hours  famotidine    Tablet 20 milliGRAM(s) Oral two times a day  losartan 25 milliGRAM(s) Oral daily  metoprolol succinate ER 25 milliGRAM(s) Oral daily  mirtazapine 7.5 milliGRAM(s) Oral daily  pantoprazole    Tablet 40 milliGRAM(s) Oral before breakfast  sertraline 50 milliGRAM(s) Oral daily  sodium chloride 0.9%. 1000 milliLiter(s) (100 mL/Hr) IV Continuous <Continuous>  sodium chloride 0.9%. 1000 milliLiter(s) (100 mL/Hr) IV Continuous <Continuous>  ticagrelor 90 milliGRAM(s) Oral every 12 hours    MEDICATIONS  (PRN):  acetaminophen     Tablet .. 650 milliGRAM(s) Oral every 6 hours PRN Temp greater or equal to 38C (100.4F), Mild Pain (1 - 3)  aluminum hydroxide/magnesium hydroxide/simethicone Suspension 30 milliLiter(s) Oral every 4 hours PRN Dyspepsia  melatonin 3 milliGRAM(s) Oral at bedtime PRN Insomnia  OLANZapine Injectable 5 milliGRAM(s) IntraMuscular every 6 hours PRN agitation  ondansetron Injectable 4 milliGRAM(s) IV Push every 8 hours PRN Nausea and/or Vomiting    Allergies    No Known Allergies    Intolerances        LABS:                        12.4   7.13  )-----------( 308      ( 06 Dec 2024 06:27 )             38.4     12-06    145  |  109[H]  |  17.1  ----------------------------<  94  3.7   |  23.0  |  0.54    Ca    9.3      06 Dec 2024 06:27  Phos  3.2     12-06  Mg     2.2     12-06        Urinalysis Basic - ( 06 Dec 2024 06:27 )    Color: x / Appearance: x / SG: x / pH: x  Gluc: 94 mg/dL / Ketone: x  / Bili: x / Urobili: x   Blood: x / Protein: x / Nitrite: x   Leuk Esterase: x / RBC: x / WBC x   Sq Epi: x / Non Sq Epi: x / Bacteria: x      CAPILLARY BLOOD GLUCOSE          CULTURE DATA:    Culture - Urine (collected 11-24-24 @ 14:50)  Source: Clean Catch Clean Catch (Midstream)  Final Report (11-26-24 @ 08:02):    No growth        RADIOLOGY & ADDITIONAL TESTS:  < from: Xray Cinesophagram Swallow Function w/ Contrast (12.02.24 @ 10:47) >  INTERPRETATION:  Clinical history: 69-year-old male, dysphasia.    Modified barium swallowing study was performed by the speech pathology   staff, cine images were obtained.    FINDINGS: Characterized in speech pathologist's report.    Fluoroscopy time: 2.7 minutes.    IMPRESSION:    Modified barium swallowing study, findings characterized in speech   pathologist's report    < end of copied text >  < from: CT Head No Cont (11.25.24 @ 02:09) >    IMPRESSION:  No evidence of an acute intracranial hemorrhage.    < end of copied text >  < from: Xray Chest 1 View- PORTABLE-Urgent (Xray Chest 1 View- PORTABLE-Urgent .) (11.24.24 @ 11:06) >  IMPRESSION: No acute cardiopulmonary disease process.    < end of copied text >  < from: CT Angio Neck w/ IV Cont (11.24.24 @ 09:39) >    IMPRESSION:  HEAD CT: No evidence of an acute traumatic intracranial injury.  CERVICAL SPINE CT:  No evidence of an acute cervical spine fracture.   Large anterior bridging osteophytes. 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 Left-sided thyroid nodules measuring up to 2.2 cm. Correlate   with nonemergent ultrasonography.  FACIAL CT: Acute nondisplaced bilateral nasal bone fractures. Soft tissue   swelling involves the nose and cartilaginous nasal septum.  NECK CTA: No hemodynamic significant narowing within the neck.  BRAIN CTA: No proximal large vessel occlusion. 2 overlapping stents   within the mid and distal basilar artery which appears to treat a 1.9 x   2.4 x 2.0 cm aneurysm. Tracefilling of the aneurysm towards the upper   aspect of the stent. Correlate with history and prior outside imaging   findings.    < end of copied text >  < from: CT Angio Head w/ IV Cont (11.24.24 @ 09:39) >  IMPRESSION:  HEAD CT: No evidence of an acute traumatic intracranial injury.  CERVICAL SPINE CT:  No evidence of an acute cervical spine fracture.   Large anterior bridging osteophytes. 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 Left-sided thyroid nodules measuring up to 2.2 cm. Correlate   with nonemergent ultrasonography.  FACIAL CT: Acute nondisplaced bilateral nasal bone fractures. Soft tissue   swelling involves the nose and cartilaginous nasal septum.  NECK CTA: No hemodynamic significant narowing within the neck.  BRAIN CTA: No proximal large vessel occlusion. 2 overlapping stents   within the mid and distal basilar artery which appears to treat a 1.9 x   2.4 x 2.0 cm aneurysm. Tracefilling of the aneurysm towards the upper   aspect of the stent. Correlate with history and prior outside imaging   findings.    < end of copied text >

## 2024-12-06 NOTE — PROGRESS NOTE ADULT - ASSESSMENT
· Assessment	  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), recent hospital admission at [facility] (6/14-7/17/24) for stroke, partially thrombosed fusiform basilar artery aneurysm w/ subsequent cerebral dysfunction and rt sided paralysis and now nonverbal and w/ behavioral disturbances presented from [facility] after fall earlier this morning.  Pt reported by family to have continued decline in mentation since stroke but significantly agitated the past few days.  Initial w/u significant for Hb 12.1, trops 56-->53, EKG w/ TWI in V2-V6.  CT maxillofacial significant for acute nondisplaced bilateral nasal bone fractures.  CTH and Cspine negative.  CTA neck and brain negative for acute findings. Patient treated for failure to thrive in setting of probable vascular dementia. Patient found to have mechanical ability to swallow but low motivation.     #Failure to thrive.   -Patient cleared for modthick liquids and puree diet by speech and swallow  -Patient can mechanically handle diet but does not have motivation  -calorie count ordered  -family decided to move foward with [facility]  -Mirtazapine and merilon added to increase appetite     #Type II MI, possibley demand ischemia, resolved  - Trops 56-->53 --> 51 --> 50  - EKG shows V2-V6 t wave inversion   - TTE shows EF of 54% with no wall motion abnormalities and grade 1 dystolic dysfunction. Previous Echo in 7.24 with EF of 74 %  - Check lipid panel and A1c   - restarted home cardiac meds   - telemetry discontinued      Hypernatremia, resolved  -Na: 146 --> 145  -S/p 500cc NS IV    Bradycardia  -patient bradycardic overnight  -EKG similar to previous  -continue metoprolol      Mechanical fall w/ subsequent nasal bone fractures  - CT reviewed  - PT  - Analgesics as needed   - Fall and aspiration precautions       Acute metabolic encephalopathy likely 2/2 Vascular Dementia  - Suspect part of decline in mental status related to recent stroke/aneurysm repair  - Per family pts mental status has been declining over the past few weeks but significantly worse recently   - No evidence of acute infection and UA negative for UTI   - Pt s/p fall w/ head trauma and initial CTH negative for ICH   - CXR w/out consolidation/infiltrate   - Dehydrated on exam which can contribute to delirium given occasional IVF  - Concern for dysphagia given mental status  - puree diet  - Fall and aspiration precautions   - PRN IM zyprexa if needed for severe agitation  -Avoid benzos  - adhere to strict hospital delirium precautions, escalate admission to room  - Once cleared for diet resume home sertraline,  -  consultation      Hypokalemia  - resolved      Mild normocytic anemia, resolved  - Baseline H/H unknown   - Reported to be on 3 blood thinners as per family but awaiting med rec from [facility] to be fax'ed over to confirm (per  first pt is on ASA and brillinta)  - Dry blood in and around nares noted   - Hemodynamically stable   - stable  - Transfuse for Hb<8      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  Trace filling of the aneurysm towards the upper aspect of the stent noted.   - Can not have MRI as per family bc pt has Rt eye implant that is not MRI compatible  - CXR negative  - restarted DAPT  - home meds restarted    DVT prophylaxis: restarted heparin   · Assessment	  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), recent hospital admission at [facility] (6/14-7/17/24) for stroke, partially thrombosed fusiform basilar artery aneurysm w/ subsequent cerebral dysfunction and rt sided paralysis and now nonverbal and w/ behavioral disturbances presented from [facility] after fall earlier this morning.  Pt reported by family to have continued decline in mentation since stroke but significantly agitated the past few days.  Initial w/u significant for Hb 12.1, trops 56-->53, EKG w/ TWI in V2-V6.  CT maxillofacial significant for acute nondisplaced bilateral nasal bone fractures.  CTH and Cspine negative.  CTA neck and brain negative for acute findings. Patient treated for failure to thrive in setting of probable vascular dementia. Patient found to have mechanical ability to swallow but low motivation.     #Failure to thrive.   -Patient cleared for modthick liquids and puree diet by speech and swallow  -Patient can mechanically handle diet but does not have motivation  -calorie count ordered  -family decided to move foward with [facility]  -Mirtazapine and marinol  added to increase appetite     #Type II MI, possibley demand ischemia, resolved  - Trops 56-->53 --> 51 --> 50  - EKG shows V2-V6 t wave inversion   - TTE shows EF of 54% with no wall motion abnormalities and grade 1 dystolic dysfunction. Previous Echo in 7.24 with EF of 74 %  - Check lipid panel and A1c   - restarted home cardiac meds   - telemetry discontinued      Hypernatremia, resolved  -Na: 146 --> 145  -S/p 500cc NS IV    Bradycardia  -patient bradycardic overnight  -EKG similar to previous  -continue metoprolol      Mechanical fall w/ subsequent nasal bone fractures  - CT reviewed  - PT  - Analgesics as needed   - Fall and aspiration precautions       Acute metabolic encephalopathy likely 2/2 Vascular Dementia  - Suspect part of decline in mental status related to recent stroke/aneurysm repair  - Per family pts mental status has been declining over the past few weeks but significantly worse recently   - No evidence of acute infection and UA negative for UTI   - Pt s/p fall w/ head trauma and initial CTH negative for ICH   - CXR w/out consolidation/infiltrate   - Dehydrated on exam which can contribute to delirium given occasional IVF  - Concern for dysphagia given mental status  - puree diet  - Fall and aspiration precautions   - PRN IM zyprexa if needed for severe agitation  -Avoid benzos  - adhere to strict hospital delirium precautions, escalate admission to room  - Once cleared for diet resume home sertraline,  -  consultation      Hypokalemia  - resolved      Mild normocytic anemia, resolved  - Baseline H/H unknown   - Reported to be on 3 blood thinners as per family but awaiting med rec from [place] to be fax'ed over to confirm (per Dr. 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  Trace filling of the aneurysm towards the upper aspect of the stent noted.   - Can not have MRI as per family bc pt has Rt eye implant that is not MRI compatible  - CXR negative  - restarted DAPT  - home meds restarted    DVT prophylaxis: restarted heparin

## 2024-12-07 LAB
ANION GAP SERPL CALC-SCNC: 8 MMOL/L — SIGNIFICANT CHANGE UP (ref 5–17)
BUN SERPL-MCNC: 16.3 MG/DL — SIGNIFICANT CHANGE UP (ref 8–20)
CALCIUM SERPL-MCNC: 9.5 MG/DL — SIGNIFICANT CHANGE UP (ref 8.4–10.5)
CHLORIDE SERPL-SCNC: 111 MMOL/L — HIGH (ref 96–108)
CO2 SERPL-SCNC: 28 MMOL/L — SIGNIFICANT CHANGE UP (ref 22–29)
CREAT SERPL-MCNC: 0.61 MG/DL — SIGNIFICANT CHANGE UP (ref 0.5–1.3)
EGFR: 105 ML/MIN/1.73M2 — SIGNIFICANT CHANGE UP
GLUCOSE SERPL-MCNC: 99 MG/DL — SIGNIFICANT CHANGE UP (ref 70–99)
POTASSIUM SERPL-MCNC: 4 MMOL/L — SIGNIFICANT CHANGE UP (ref 3.5–5.3)
POTASSIUM SERPL-SCNC: 4 MMOL/L — SIGNIFICANT CHANGE UP (ref 3.5–5.3)
SODIUM SERPL-SCNC: 147 MMOL/L — HIGH (ref 135–145)

## 2024-12-07 PROCEDURE — 99233 SBSQ HOSP IP/OBS HIGH 50: CPT | Mod: GC

## 2024-12-07 PROCEDURE — 93931 UPPER EXTREMITY STUDY: CPT | Mod: 26,LT

## 2024-12-07 PROCEDURE — 93971 EXTREMITY STUDY: CPT | Mod: 26,LT

## 2024-12-07 RX ORDER — SODIUM CHLORIDE 9 MG/ML
1000 INJECTION, SOLUTION INTRAMUSCULAR; INTRAVENOUS; SUBCUTANEOUS
Refills: 0 | Status: COMPLETED | OUTPATIENT
Start: 2024-12-07 | End: 2024-12-07

## 2024-12-07 RX ORDER — AMLODIPINE BESYLATE 10 MG/1
1 TABLET ORAL
Refills: 0 | DISCHARGE

## 2024-12-07 RX ADMIN — METOPROLOL TARTRATE 25 MILLIGRAM(S): 100 TABLET, FILM COATED ORAL at 05:35

## 2024-12-07 RX ADMIN — TICAGRELOR 90 MILLIGRAM(S): 90 TABLET ORAL at 05:35

## 2024-12-07 RX ADMIN — OLANZAPINE 5 MILLIGRAM(S): 20 TABLET ORAL at 13:47

## 2024-12-07 RX ADMIN — Medication 2.5 MILLIGRAM(S): at 05:41

## 2024-12-07 RX ADMIN — ENOXAPARIN SODIUM 40 MILLIGRAM(S): 30 INJECTION SUBCUTANEOUS at 13:47

## 2024-12-07 RX ADMIN — FAMOTIDINE 20 MILLIGRAM(S): 20 TABLET, FILM COATED ORAL at 05:41

## 2024-12-07 RX ADMIN — PANTOPRAZOLE SODIUM 40 MILLIGRAM(S): 40 TABLET, DELAYED RELEASE ORAL at 05:35

## 2024-12-07 RX ADMIN — SERTRALINE HYDROCHLORIDE 50 MILLIGRAM(S): 100 TABLET, FILM COATED ORAL at 13:47

## 2024-12-07 RX ADMIN — MIRTAZAPINE 7.5 MILLIGRAM(S): 15 TABLET, FILM COATED ORAL at 13:46

## 2024-12-07 RX ADMIN — Medication 5 MILLIGRAM(S): at 13:47

## 2024-12-07 RX ADMIN — Medication 81 MILLIGRAM(S): at 13:46

## 2024-12-07 RX ADMIN — LOSARTAN POTASSIUM 25 MILLIGRAM(S): 100 TABLET, FILM COATED ORAL at 05:35

## 2024-12-07 RX ADMIN — Medication 40 MILLIGRAM(S): at 22:09

## 2024-12-07 RX ADMIN — SODIUM CHLORIDE 100 MILLILITER(S): 9 INJECTION, SOLUTION INTRAMUSCULAR; INTRAVENOUS; SUBCUTANEOUS at 16:39

## 2024-12-07 NOTE — PROGRESS NOTE ADULT - TIME BILLING
D/W RN, hospitalist Dr Chaparro and resident Dr Osorio, spouse and two sons  Total time also includes discussion during interdisciplinary team rounds, chart review including but not limited to prior admissions/ GOC discussions,  review of medications/ labs/ imaging, examination, care coordination with other health care professionals, documentation EXCLUDING  advance care planning discussions.
Time spent reviewing the chart documentation, reviewing labs and imaging studies, evaluating the patient, discussing the plan of care with the consultants & medical team, and documenting.
patient care , labs, meds. chart review , documentation
patient care , labs ,meds, chart review , documentation
patient care , labs ,meds, chart review, documentation
patient care , labs, meds, chart review , documentation

## 2024-12-07 NOTE — PROGRESS NOTE ADULT - ATTENDING COMMENTS
awake, comfortable. in no acute distress.   noted with left forearm colder than right , no cyanosis noted , also mild redness noted at iv site     failure to thrive   Acute metabolic encephalopathy likely 2/2 Vascular Dementia/ progressive / advanced   Type II MI, possibly demand ischemia   Hypernatremia  Bradycardia  Mechanical fall w/ subsequent nasal bone fractures  dysphagia   Mild normocytic anemia/ likely chronic   left forearm cold to touch , mild dorsal erythema at iv site       - c/w mirtazapine, marinol   - c/w remainder current meds   - encourage po intake   - check duplex arterial/ venous left arm , if neg then plan  for dc to [facility] , needs re auth , sw/ cm for dc planning .

## 2024-12-07 NOTE — PROGRESS NOTE ADULT - SUBJECTIVE AND OBJECTIVE BOX
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 Patient with right sided paralysis presenting from [facility] after fall. Patient had worsening decline in mentation since stroke and significantly agitated and impulsive few days prior to admission. Patient CT head was negative and treated for failure to thrive due to underlying vascular dementia. Patient stable for discharge.     LAST 24 HOURS:  TODAY: Patient non verbal with right sided deficit  Patient left arm is cold. Patient does not not withdrawal arm to touch. ROS not completed due to non verbal baseline.     OBJECTIVE  PHYSICAL EXAM:  GENERAL: no acute distress, comfortably in bed  HEAD: NC/AT  EYES: PERRLA, EOMI, Non-icteric  ENT: Mucous membranes moist, neck supple  NEURO: moving left extremities spontaneously, A&Ox0, slurred speach, 2-3 words at a time. Patient with deceits in right side with left facial droop.   PSYCH: Non fluent speech  RESP: CTAB, no wrr, non-labored breathing  CVS: RRR, no murmur appreciated  GI: Soft, non-tender, non-distended, no organomegaly, no appreciable masses, +bs all 4 quadrants  : salmeron, no suprapubic tenderness  EXTREMITIES: Patient left arm colder to touch with +1 pulse. 2+ pulse in other extremities.   SKIN: Ecchymosis on left dorsal forearm near site of former IV.   Vital Signs Last 24 Hrs  T(C): 36.7 (07 Dec 2024 08:12), Max: 36.7 (07 Dec 2024 08:12)  T(F): 98 (07 Dec 2024 08:12), Max: 98 (07 Dec 2024 08:12)  HR: 55 (07 Dec 2024 08:12) (52 - 63)  BP: 175/90 (07 Dec 2024 08:12) (133/74 - 175/90)  BP(mean): --  RR: 19 (07 Dec 2024 08:12) (18 - 19)  SpO2: 98% (07 Dec 2024 08:12) (97% - 98%)    Parameters below as of 07 Dec 2024 08:12  Patient On (Oxygen Delivery Method): room air            MEDICATIONS  (STANDING):  aspirin  chewable 81 milliGRAM(s) Oral daily  atorvastatin 40 milliGRAM(s) Oral at bedtime  baclofen 5 milliGRAM(s) Oral every 12 hours  dronabinol 2.5 milliGRAM(s) Oral two times a day  enoxaparin Injectable 40 milliGRAM(s) SubCutaneous every 24 hours  famotidine    Tablet 20 milliGRAM(s) Oral two times a day  losartan 25 milliGRAM(s) Oral daily  metoprolol succinate ER 25 milliGRAM(s) Oral daily  mirtazapine 7.5 milliGRAM(s) Oral daily  pantoprazole    Tablet 40 milliGRAM(s) Oral before breakfast  sertraline 50 milliGRAM(s) Oral daily  sodium chloride 0.9%. 1000 milliLiter(s) (100 mL/Hr) IV Continuous <Continuous>  sodium chloride 0.9%. 1000 milliLiter(s) (100 mL/Hr) IV Continuous <Continuous>  sodium chloride 0.9%. 1000 milliLiter(s) (100 mL/Hr) IV Continuous <Continuous>  ticagrelor 90 milliGRAM(s) Oral every 12 hours    MEDICATIONS  (PRN):  acetaminophen     Tablet .. 650 milliGRAM(s) Oral every 6 hours PRN Temp greater or equal to 38C (100.4F), Mild Pain (1 - 3)  aluminum hydroxide/magnesium hydroxide/simethicone Suspension 30 milliLiter(s) Oral every 4 hours PRN Dyspepsia  melatonin 3 milliGRAM(s) Oral at bedtime PRN Insomnia  OLANZapine Injectable 5 milliGRAM(s) IntraMuscular every 6 hours PRN agitation  ondansetron Injectable 4 milliGRAM(s) IV Push every 8 hours PRN Nausea and/or Vomiting    Allergies    No Known Allergies    Intolerances        LABS:                        12.4   7.13  )-----------( 308      ( 06 Dec 2024 06:27 )             38.4     12-07    147[H]  |  111[H]  |  16.3  ----------------------------<  99  4.0   |  28.0  |  0.61    Ca    9.5      07 Dec 2024 05:40  Phos  3.2     12-06  Mg     2.2     12-06        Urinalysis Basic - ( 07 Dec 2024 05:40 )    Color: x / Appearance: x / SG: x / pH: x  Gluc: 99 mg/dL / Ketone: x  / Bili: x / Urobili: x   Blood: x / Protein: x / Nitrite: x   Leuk Esterase: x / RBC: x / WBC x   Sq Epi: x / Non Sq Epi: x / Bacteria: x      CAPILLARY BLOOD GLUCOSE          CULTURE DATA:    Culture - Urine (collected 11-24-24 @ 14:50)  Source: Clean Catch Clean Catch (Midstream)  Final Report (11-26-24 @ 08:02):    No growth        RADIOLOGY & ADDITIONAL TESTS:

## 2024-12-07 NOTE — PROGRESS NOTE ADULT - ASSESSMENT
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), recent hospital admission at [facility] (6/14-7/17/24) for stroke, partially thrombosed fusiform basilar artery aneurysm w/ subsequent cerebral dysfunction and rt sided paralysis and now nonverbal and w/ behavioral disturbances presented from [facility] after fall earlier this morning.  Pt reported by family to have continued decline in mentation since stroke but significantly agitated the past few days.  Initial w/u significant for Hb 12.1, trops 56-->53, EKG w/ TWI in V2-V6.  CT maxillofacial significant for acute nondisplaced bilateral nasal bone fractures.  CTH and Cspine negative.  CTA neck and brain negative for acute findings. Patient treated for failure to thrive in setting of probable vascular dementia. Patient found to have mechanical ability to swallow but low motivation.     #Failure to thrive.   -Patient cleared for modthick liquids and puree diet by speech and swallow  -Patient can mechanically handle diet but does not have motivation  -calorie count ordered  -family decided to move foward with [facility]  -Mirtazapine and marinol  added to increase appetite     #left arm cool to touch  -US arterial and venous pending  -patient will be given zyprexa before ultrasound  -ecchymosis at site of previous IV likely superficial thrombophlebitis.     #Type II MI, possibley demand ischemia, resolved  - Trops 56-->53 --> 51 --> 50  - EKG shows V2-V6 t wave inversion   - TTE shows EF of 54% with no wall motion abnormalities and grade 1 dystolic dysfunction. Previous Echo in 7.24 with EF of 74 %  - Check lipid panel and A1c   - restarted home cardiac meds   - telemetry discontinued      Hypernatremia,  -Na: 146 --> 145 --> 147  -will give IV NS when patient is sedated enough to give IV fluids.     Bradycardia  -patient bradycardic overnight  -EKG similar to previous  -continue metoprolol      Mechanical fall w/ subsequent nasal bone fractures  - CT reviewed  - PT  - Analgesics as needed   - Fall and aspiration precautions       Acute metabolic encephalopathy likely 2/2 Vascular Dementia  - Suspect part of decline in mental status related to recent stroke/aneurysm repair  - Per family pts mental status has been declining over the past few weeks but significantly worse recently   - No evidence of acute infection and UA negative for UTI   - Pt s/p fall w/ head trauma and initial CTH negative for ICH   - CXR w/out consolidation/infiltrate   - Dehydrated on exam which can contribute to delirium given occasional IVF  - Concern for dysphagia given mental status  - puree diet  - Fall and aspiration precautions   - PRN IM zyprexa if needed for severe agitation  -Avoid benzos  - adhere to strict hospital delirium precautions, escalate admission to room  - Once cleared for diet resume home sertraline,  -  consultation      Hypokalemia  - resolved      Mild normocytic anemia, resolved  - Baseline H/H unknown   - Reported to be on 3 blood thinners as per family but awaiting med rec from [place] to be fax'ed over to confirm (per  first pt is on ASA and brillinta)  - Dry blood in and around nares noted   - Hemodynamically stable   - stable  - Transfuse for Hb<8      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  Trace filling of the aneurysm towards the upper aspect of the stent noted.   - Can not have MRI as per family bc pt has Rt eye implant that is not MRI compatible  - CXR negative  - restarted DAPT  - home meds restarted    DVT prophylaxis: restarted heparin

## 2024-12-08 LAB
ANION GAP SERPL CALC-SCNC: 12 MMOL/L — SIGNIFICANT CHANGE UP (ref 5–17)
BUN SERPL-MCNC: 14.8 MG/DL — SIGNIFICANT CHANGE UP (ref 8–20)
CALCIUM SERPL-MCNC: 9.2 MG/DL — SIGNIFICANT CHANGE UP (ref 8.4–10.5)
CHLORIDE SERPL-SCNC: 111 MMOL/L — HIGH (ref 96–108)
CO2 SERPL-SCNC: 25 MMOL/L — SIGNIFICANT CHANGE UP (ref 22–29)
CREAT SERPL-MCNC: 0.51 MG/DL — SIGNIFICANT CHANGE UP (ref 0.5–1.3)
EGFR: 110 ML/MIN/1.73M2 — SIGNIFICANT CHANGE UP
GLUCOSE SERPL-MCNC: 90 MG/DL — SIGNIFICANT CHANGE UP (ref 70–99)
HCT VFR BLD CALC: 38.2 % — LOW (ref 39–50)
HGB BLD-MCNC: 12.6 G/DL — LOW (ref 13–17)
MAGNESIUM SERPL-MCNC: 2.1 MG/DL — SIGNIFICANT CHANGE UP (ref 1.6–2.6)
MCHC RBC-ENTMCNC: 30 PG — SIGNIFICANT CHANGE UP (ref 27–34)
MCHC RBC-ENTMCNC: 33 G/DL — SIGNIFICANT CHANGE UP (ref 32–36)
MCV RBC AUTO: 91 FL — SIGNIFICANT CHANGE UP (ref 80–100)
PLATELET # BLD AUTO: 312 K/UL — SIGNIFICANT CHANGE UP (ref 150–400)
POTASSIUM SERPL-MCNC: 3.7 MMOL/L — SIGNIFICANT CHANGE UP (ref 3.5–5.3)
POTASSIUM SERPL-SCNC: 3.7 MMOL/L — SIGNIFICANT CHANGE UP (ref 3.5–5.3)
RBC # BLD: 4.2 M/UL — SIGNIFICANT CHANGE UP (ref 4.2–5.8)
RBC # FLD: 14.2 % — SIGNIFICANT CHANGE UP (ref 10.3–14.5)
SODIUM SERPL-SCNC: 148 MMOL/L — HIGH (ref 135–145)
WBC # BLD: 6.48 K/UL — SIGNIFICANT CHANGE UP (ref 3.8–10.5)
WBC # FLD AUTO: 6.48 K/UL — SIGNIFICANT CHANGE UP (ref 3.8–10.5)

## 2024-12-08 PROCEDURE — 99232 SBSQ HOSP IP/OBS MODERATE 35: CPT

## 2024-12-08 RX ORDER — CEFAZOLIN SODIUM 10 G
1000 VIAL (EA) INJECTION EVERY 8 HOURS
Refills: 0 | Status: DISCONTINUED | OUTPATIENT
Start: 2024-12-08 | End: 2024-12-10

## 2024-12-08 RX ORDER — 0.9 % SODIUM CHLORIDE 0.9 %
1000 INTRAVENOUS SOLUTION INTRAVENOUS
Refills: 0 | Status: DISCONTINUED | OUTPATIENT
Start: 2024-12-08 | End: 2024-12-10

## 2024-12-08 RX ORDER — CEFAZOLIN SODIUM 10 G
1000 VIAL (EA) INJECTION ONCE
Refills: 0 | Status: COMPLETED | OUTPATIENT
Start: 2024-12-08 | End: 2024-12-08

## 2024-12-08 RX ORDER — HYDRALAZINE HYDROCHLORIDE 10 MG/1
10 TABLET ORAL ONCE
Refills: 0 | Status: COMPLETED | OUTPATIENT
Start: 2024-12-08 | End: 2024-12-08

## 2024-12-08 RX ORDER — CEFAZOLIN SODIUM 10 G
VIAL (EA) INJECTION
Refills: 0 | Status: DISCONTINUED | OUTPATIENT
Start: 2024-12-08 | End: 2024-12-10

## 2024-12-08 RX ORDER — CEFAZOLIN SODIUM 10 G
VIAL (EA) INJECTION
Refills: 0 | Status: DISCONTINUED | OUTPATIENT
Start: 2024-12-08 | End: 2024-12-08

## 2024-12-08 RX ADMIN — TICAGRELOR 90 MILLIGRAM(S): 90 TABLET ORAL at 17:45

## 2024-12-08 RX ADMIN — Medication 81 MILLIGRAM(S): at 12:41

## 2024-12-08 RX ADMIN — Medication 2.5 MILLIGRAM(S): at 05:58

## 2024-12-08 RX ADMIN — HYDRALAZINE HYDROCHLORIDE 10 MILLIGRAM(S): 10 TABLET ORAL at 05:56

## 2024-12-08 RX ADMIN — MIRTAZAPINE 7.5 MILLIGRAM(S): 15 TABLET, FILM COATED ORAL at 12:41

## 2024-12-08 RX ADMIN — ENOXAPARIN SODIUM 40 MILLIGRAM(S): 30 INJECTION SUBCUTANEOUS at 12:41

## 2024-12-08 RX ADMIN — FAMOTIDINE 20 MILLIGRAM(S): 20 TABLET, FILM COATED ORAL at 05:58

## 2024-12-08 RX ADMIN — Medication 40 MILLIGRAM(S): at 21:42

## 2024-12-08 RX ADMIN — SERTRALINE HYDROCHLORIDE 50 MILLIGRAM(S): 100 TABLET, FILM COATED ORAL at 12:41

## 2024-12-08 RX ADMIN — Medication 5 MILLIGRAM(S): at 17:45

## 2024-12-08 RX ADMIN — Medication 2.5 MILLIGRAM(S): at 17:44

## 2024-12-08 RX ADMIN — Medication 5 MILLIGRAM(S): at 05:59

## 2024-12-08 RX ADMIN — LOSARTAN POTASSIUM 25 MILLIGRAM(S): 100 TABLET, FILM COATED ORAL at 04:45

## 2024-12-08 RX ADMIN — PANTOPRAZOLE SODIUM 40 MILLIGRAM(S): 40 TABLET, DELAYED RELEASE ORAL at 08:50

## 2024-12-08 RX ADMIN — TICAGRELOR 90 MILLIGRAM(S): 90 TABLET ORAL at 05:58

## 2024-12-08 RX ADMIN — Medication 75 MILLILITER(S): at 09:47

## 2024-12-08 RX ADMIN — FAMOTIDINE 20 MILLIGRAM(S): 20 TABLET, FILM COATED ORAL at 17:45

## 2024-12-08 RX ADMIN — Medication 1000 MILLIGRAM(S): at 17:44

## 2024-12-08 RX ADMIN — METOPROLOL TARTRATE 25 MILLIGRAM(S): 100 TABLET, FILM COATED ORAL at 04:45

## 2024-12-08 RX ADMIN — Medication 1000 MILLIGRAM(S): at 21:42

## 2024-12-08 NOTE — PROGRESS NOTE ADULT - SUBJECTIVE AND OBJECTIVE BOX
[facility] Division of Hospital Medicine    SUBJECTIVE / OVERNIGHT EVENTS:  no events    Patient denies chest pain, SOB, abd pain, N/V, fever, chills, dysuria or any other complaints. All remainder ROS negative.     MEDICATIONS  (STANDING):  aspirin  chewable 81 milliGRAM(s) Oral daily  atorvastatin 40 milliGRAM(s) Oral at bedtime  baclofen 5 milliGRAM(s) Oral every 12 hours  dextrose 5% + sodium chloride 0.45%. 1000 milliLiter(s) (75 mL/Hr) IV Continuous <Continuous>  dronabinol 2.5 milliGRAM(s) Oral two times a day  enoxaparin Injectable 40 milliGRAM(s) SubCutaneous every 24 hours  famotidine    Tablet 20 milliGRAM(s) Oral two times a day  losartan 25 milliGRAM(s) Oral daily  metoprolol succinate ER 25 milliGRAM(s) Oral daily  mirtazapine 7.5 milliGRAM(s) Oral daily  pantoprazole    Tablet 40 milliGRAM(s) Oral before breakfast  sertraline 50 milliGRAM(s) Oral daily  ticagrelor 90 milliGRAM(s) Oral every 12 hours    MEDICATIONS  (PRN):  acetaminophen     Tablet .. 650 milliGRAM(s) Oral every 6 hours PRN Temp greater or equal to 38C (100.4F), Mild Pain (1 - 3)  aluminum hydroxide/magnesium hydroxide/simethicone Suspension 30 milliLiter(s) Oral every 4 hours PRN Dyspepsia  melatonin 3 milliGRAM(s) Oral at bedtime PRN Insomnia  OLANZapine Injectable 5 milliGRAM(s) IntraMuscular every 6 hours PRN agitation  ondansetron Injectable 4 milliGRAM(s) IV Push every 8 hours PRN Nausea and/or Vomiting        I&O's Summary      PHYSICAL EXAM:  Vital Signs Last 24 Hrs  T(C): 36.3 (08 Dec 2024 08:30), Max: 36.4 (08 Dec 2024 05:06)  T(F): 97.3 (08 Dec 2024 08:30), Max: 97.5 (08 Dec 2024 05:06)  HR: 59 (08 Dec 2024 08:30) (42 - 68)  BP: 138/75 (08 Dec 2024 08:30) (133/71 - 189/110)  BP(mean): --  RR: 18 (08 Dec 2024 08:30) (18 - 18)  SpO2: 98% (08 Dec 2024 08:30) (98% - 100%)    Parameters below as of 08 Dec 2024 08:30  Patient On (Oxygen Delivery Method): room air            CONSTITUTIONAL: NAD, appears stated age  ENMT: Moist oral mucosa, no pharyngeal injection or exudates; normal dentition  RESPIRATORY: Normal respiratory effort; clear to auscultation bilaterally  CARDIOVASCULAR: Regular rate and rhythm, normal S1 and S2, no murmur/rub/gallop; Peripheral pulses are 2+ bilaterally  ABDOMEN: Nontender to palpation, normoactive bowel sounds, no rebound/guarding;   MUSCLOSKELETAL:  No clubbing or cyanosis of digits; no joint swelling or tenderness to palpation  PSYCH: A+O to person only ; affect appropriate  NEUROLOGY: CN 2-12 are intact and symmetric; no gross sensory deficits;   SKIN: No rashes; no palpable lesions    LABS:                        12.6   6.48  )-----------( 312      ( 08 Dec 2024 06:34 )             38.2     12-08    148[H]  |  111[H]  |  14.8  ----------------------------<  90  3.7   |  25.0  |  0.51    Ca    9.2      08 Dec 2024 06:34  Mg     2.1     12-08            Urinalysis Basic - ( 08 Dec 2024 06:34 )    Color: x / Appearance: x / SG: x / pH: x  Gluc: 90 mg/dL / Ketone: x  / Bili: x / Urobili: x   Blood: x / Protein: x / Nitrite: x   Leuk Esterase: x / RBC: x / WBC x   Sq Epi: x / Non Sq Epi: x / Bacteria: x        CAPILLARY BLOOD GLUCOSE            RADIOLOGY & ADDITIONAL TESTS:  Results Reviewed:   Imaging Personally Reviewed:  Electrocardiogram Personally Reviewed:

## 2024-12-08 NOTE — PROGRESS NOTE ADULT - NUTRITIONAL ASSESSMENT
This patient has been assessed with a concern for Malnutrition and has been determined to have a diagnosis/diagnoses of Severe protein-calorie malnutrition.    This patient is being managed with:   Diet Pureed-  DASH/TLC {Sodium & Cholesterol Restricted} (DASH)  Moderately Thick Liquids (MODTHICKLIQS)  Supplement Feeding Modality:  Oral  Ensure Enlive Cans or Servings Per Day:  1       Frequency:  Two Times a day  Entered: Nov 30 2024 11:33AM  

## 2024-12-08 NOTE — PROGRESS NOTE ADULT - ASSESSMENT
69M w/ PMH of HTN, Rt eye implant (not MR compatible), recent hospital admission at [facility] (6/14-7/17/24) for stroke, partially thrombosed fusiform basilar artery aneurysm w/ subsequent cerebral dysfunction and rt sided paralysis and now nonverbal and w/ behavioral disturbances presented from [facility] after fall.    #Failure to thrive.   - modthick liquids and puree diet by speech and swallow  -Patient can mechanically handle diet but does not have motivation  -calorie count ordered  -family decided to move foward with [facility]  -Mirtazapine and marinol  added to increase appetite     #left arm cool to touch  -US arterial and venous benign     #Type II MI, possibly demand ischemia, resolved  - Trops 56-->53 --> 51 --> 50  - EKG shows V2-V6 t wave inversion   - TTE shows EF of 54% with no wall motion abnormalities and grade 1 dystolic dysfunction. Previous Echo in 7.24 with EF of 74 %  - restarted home cardiac meds   - telemetry discontinued    Hypernatremia,  -will give IV NS when patient is sedated enough to give IV fluids.   NS Stopped and changed to D5 NS. Hypotonic fluids for hypernatremia     Bradycardia  -EKG similar to previous  -continue metoprolol    Mechanical fall w/ subsequent nasal bone fractures  - CT reviewed  - PT  - Analgesics as needed   - Fall and aspiration precautions     Acute metabolic encephalopathy likely 2/2 Vascular Dementia  - Suspect part of decline in mental status related to recent stroke/aneurysm repair  - Per family pts mental status has been declining over the past few weeks but significantly worse recently   - No evidence of acute infection and UA negative for UTI   - Pt s/p fall w/ head trauma and initial CTH negative for ICH   - CXR w/out consolidation/infiltrate   - Dehydrated on exam which can contribute to delirium given occasional IVF  - Concern for dysphagia given mental status  - Fall and aspiration precautions   - PRN IM zyprexa if needed for severe agitation  -Avoid benzos  - adhere to strict hospital delirium precautions, escalate admission to room  cont Seroquel qhs    Mild normocytic anemia, resolved  - Baseline H/H unknown    (per Dr. 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  Trace filling of the aneurysm towards the upper aspect of the stent noted.   - Can not have MRI as per family bc pt has Rt eye implant that is not MRI compatible  - CXR negative  - restarted DAPT  - home meds restarted    DVT prophylaxis: restarted heparin    Pending placement

## 2024-12-09 LAB
ANION GAP SERPL CALC-SCNC: 15 MMOL/L — SIGNIFICANT CHANGE UP (ref 5–17)
BASOPHILS # BLD AUTO: 0.07 K/UL — SIGNIFICANT CHANGE UP (ref 0–0.2)
BASOPHILS NFR BLD AUTO: 0.9 % — SIGNIFICANT CHANGE UP (ref 0–2)
BUN SERPL-MCNC: 15.4 MG/DL — SIGNIFICANT CHANGE UP (ref 8–20)
CALCIUM SERPL-MCNC: 9.6 MG/DL — SIGNIFICANT CHANGE UP (ref 8.4–10.5)
CHLORIDE SERPL-SCNC: 110 MMOL/L — HIGH (ref 96–108)
CO2 SERPL-SCNC: 22 MMOL/L — SIGNIFICANT CHANGE UP (ref 22–29)
CREAT SERPL-MCNC: 0.61 MG/DL — SIGNIFICANT CHANGE UP (ref 0.5–1.3)
EGFR: 105 ML/MIN/1.73M2 — SIGNIFICANT CHANGE UP
EOSINOPHIL # BLD AUTO: 0.06 K/UL — SIGNIFICANT CHANGE UP (ref 0–0.5)
EOSINOPHIL NFR BLD AUTO: 0.7 % — SIGNIFICANT CHANGE UP (ref 0–6)
GLUCOSE SERPL-MCNC: 87 MG/DL — SIGNIFICANT CHANGE UP (ref 70–99)
HCT VFR BLD CALC: 37.5 % — LOW (ref 39–50)
HGB BLD-MCNC: 12.5 G/DL — LOW (ref 13–17)
IMM GRANULOCYTES NFR BLD AUTO: 0.2 % — SIGNIFICANT CHANGE UP (ref 0–0.9)
LYMPHOCYTES # BLD AUTO: 1.93 K/UL — SIGNIFICANT CHANGE UP (ref 1–3.3)
LYMPHOCYTES # BLD AUTO: 24.1 % — SIGNIFICANT CHANGE UP (ref 13–44)
MCHC RBC-ENTMCNC: 30.4 PG — SIGNIFICANT CHANGE UP (ref 27–34)
MCHC RBC-ENTMCNC: 33.3 G/DL — SIGNIFICANT CHANGE UP (ref 32–36)
MCV RBC AUTO: 91.2 FL — SIGNIFICANT CHANGE UP (ref 80–100)
MONOCYTES # BLD AUTO: 0.71 K/UL — SIGNIFICANT CHANGE UP (ref 0–0.9)
MONOCYTES NFR BLD AUTO: 8.9 % — SIGNIFICANT CHANGE UP (ref 2–14)
NEUTROPHILS # BLD AUTO: 5.22 K/UL — SIGNIFICANT CHANGE UP (ref 1.8–7.4)
NEUTROPHILS NFR BLD AUTO: 65.2 % — SIGNIFICANT CHANGE UP (ref 43–77)
PLATELET # BLD AUTO: 283 K/UL — SIGNIFICANT CHANGE UP (ref 150–400)
POTASSIUM SERPL-MCNC: 3.5 MMOL/L — SIGNIFICANT CHANGE UP (ref 3.5–5.3)
POTASSIUM SERPL-SCNC: 3.5 MMOL/L — SIGNIFICANT CHANGE UP (ref 3.5–5.3)
RBC # BLD: 4.11 M/UL — LOW (ref 4.2–5.8)
RBC # FLD: 14.1 % — SIGNIFICANT CHANGE UP (ref 10.3–14.5)
SODIUM SERPL-SCNC: 147 MMOL/L — HIGH (ref 135–145)
WBC # BLD: 8.01 K/UL — SIGNIFICANT CHANGE UP (ref 3.8–10.5)
WBC # FLD AUTO: 8.01 K/UL — SIGNIFICANT CHANGE UP (ref 3.8–10.5)

## 2024-12-09 PROCEDURE — 99232 SBSQ HOSP IP/OBS MODERATE 35: CPT

## 2024-12-09 RX ADMIN — FAMOTIDINE 20 MILLIGRAM(S): 20 TABLET, FILM COATED ORAL at 05:08

## 2024-12-09 RX ADMIN — MIRTAZAPINE 7.5 MILLIGRAM(S): 15 TABLET, FILM COATED ORAL at 13:20

## 2024-12-09 RX ADMIN — Medication 1000 MILLIGRAM(S): at 22:21

## 2024-12-09 RX ADMIN — ENOXAPARIN SODIUM 40 MILLIGRAM(S): 30 INJECTION SUBCUTANEOUS at 17:52

## 2024-12-09 RX ADMIN — TICAGRELOR 90 MILLIGRAM(S): 90 TABLET ORAL at 05:07

## 2024-12-09 RX ADMIN — Medication 81 MILLIGRAM(S): at 13:19

## 2024-12-09 RX ADMIN — OLANZAPINE 5 MILLIGRAM(S): 20 TABLET ORAL at 03:27

## 2024-12-09 RX ADMIN — FAMOTIDINE 20 MILLIGRAM(S): 20 TABLET, FILM COATED ORAL at 17:52

## 2024-12-09 RX ADMIN — LOSARTAN POTASSIUM 25 MILLIGRAM(S): 100 TABLET, FILM COATED ORAL at 05:07

## 2024-12-09 RX ADMIN — TICAGRELOR 90 MILLIGRAM(S): 90 TABLET ORAL at 17:52

## 2024-12-09 RX ADMIN — Medication 5 MILLIGRAM(S): at 17:52

## 2024-12-09 RX ADMIN — SERTRALINE HYDROCHLORIDE 50 MILLIGRAM(S): 100 TABLET, FILM COATED ORAL at 13:20

## 2024-12-09 RX ADMIN — Medication 40 MILLIGRAM(S): at 22:22

## 2024-12-09 RX ADMIN — Medication 5 MILLIGRAM(S): at 05:07

## 2024-12-09 RX ADMIN — Medication 2.5 MILLIGRAM(S): at 17:52

## 2024-12-09 RX ADMIN — Medication 25 MILLIGRAM(S): at 22:22

## 2024-12-09 RX ADMIN — Medication 2.5 MILLIGRAM(S): at 05:07

## 2024-12-09 RX ADMIN — METOPROLOL TARTRATE 25 MILLIGRAM(S): 100 TABLET, FILM COATED ORAL at 05:06

## 2024-12-09 RX ADMIN — Medication 1000 MILLIGRAM(S): at 13:19

## 2024-12-09 RX ADMIN — Medication 1000 MILLIGRAM(S): at 05:06

## 2024-12-09 NOTE — CHART NOTE - NSCHARTNOTEFT_GEN_A_CORE
Source: Patient [ ]  Family [ ]   other [ x], EMR, rounds    Current Diet: pureed Dash with mod thick liquids and Ensure BID  total feed    PO intake:  < 50% [x ]   50-75%  [x ]   %  [x]  other :varies    Source for PO intake [ ] Patient [ ] family [ x] chart [ ] staff [ ] other    Enteral /Parenteral Nutrition:     Current Weight: 164.9# 11/24  no edema    % Weight Change     Pertinent Medications: MEDICATIONS  (STANDING):  aspirin  chewable 81 milliGRAM(s) Oral daily  atorvastatin 40 milliGRAM(s) Oral at bedtime  baclofen 5 milliGRAM(s) Oral every 12 hours  ceFAZolin  Injectable. 1000 milliGRAM(s) IV Push every 8 hours  ceFAZolin  Injectable.      dextrose 5% + sodium chloride 0.45%. 1000 milliLiter(s) (75 mL/Hr) IV Continuous <Continuous>  dronabinol 2.5 milliGRAM(s) Oral two times a day  enoxaparin Injectable 40 milliGRAM(s) SubCutaneous every 24 hours  famotidine    Tablet 20 milliGRAM(s) Oral two times a day  losartan 25 milliGRAM(s) Oral daily  metoprolol succinate ER 25 milliGRAM(s) Oral daily  mirtazapine 7.5 milliGRAM(s) Oral daily  pantoprazole    Tablet 40 milliGRAM(s) Oral before breakfast  QUEtiapine 25 milliGRAM(s) Oral at bedtime  sertraline 50 milliGRAM(s) Oral daily  ticagrelor 90 milliGRAM(s) Oral every 12 hours    MEDICATIONS  (PRN):  acetaminophen     Tablet .. 650 milliGRAM(s) Oral every 6 hours PRN Temp greater or equal to 38C (100.4F), Mild Pain (1 - 3)  aluminum hydroxide/magnesium hydroxide/simethicone Suspension 30 milliLiter(s) Oral every 4 hours PRN Dyspepsia  melatonin 3 milliGRAM(s) Oral at bedtime PRN Insomnia  OLANZapine Injectable 5 milliGRAM(s) IntraMuscular every 6 hours PRN agitation  ondansetron Injectable 4 milliGRAM(s) IV Push every 8 hours PRN Nausea and/or Vomiting    Pertinent Labs: CBC Full  -  ( 09 Dec 2024 05:45 )  WBC Count : 8.01 K/uL  RBC Count : 4.11 M/uL  Hemoglobin : 12.5 g/dL  Hematocrit : 37.5 %  Platelet Count - Automated : 283 K/uL  Mean Cell Volume : 91.2 fl  Mean Cell Hemoglobin : 30.4 pg  Mean Cell Hemoglobin Concentration : 33.3 g/dL  Auto Neutrophil # : 5.22 K/uL  Auto Lymphocyte # : 1.93 K/uL  Auto Monocyte # : 0.71 K/uL  Auto Eosinophil # : 0.06 K/uL  Auto Basophil # : 0.07 K/uL  Auto Neutrophil % : 65.2 %  Auto Lymphocyte % : 24.1 %  Auto Monocyte % : 8.9 %  Auto Eosinophil % : 0.7 %  Auto Basophil % : 0.9 %      12-09 Na147 mmol/L[H] Glu 87 mg/dL K+ 3.5 mmol/L Cr  0.61 mg/dL BUN 15.4 mg/dL Phos n/a   Alb n/a   PAB n/a           Skin:     Nutrition focused physical exam conducted - found signs of malnutrition [ ]absent [ ]present    Subcutaneous fat loss: [ ] Orbital fat pads region, [ ]Buccal fat region, [ ]Triceps region,  [ ]Ribs region    Muscle wasting: [ ]Temples region, [ ]Clavicle region, [ ]Shoulder region, [ ]Scapula region, [ ]Interosseous region,  [ ]thigh region, [ ]Calf region    Estimated Needs:   [ x] no change since previous assessment  [ ] recalculated:     Current Nutrition Diagnosis: Malnutrition...  moderate  related to inadequate energy intake in setting of NSTEMI, CVA, metabolic encephalopathy, swallowing difficulty  as evidenced by moderate fat/muscle depletion, <75%EER>1 mo, 31% weight loss x 3 years. Pt with varied po intake. Pt is a total feed. Plan is [facility].       Recommendations:   Continue Ensure BID to optimize po intake and provide an additional 350 kcal, 20g protein per serving   Diet as per SLP.   Encourage po intake, monitor diet tolerance, and provide assistance at meals as needed.   Rx: MVI daily, vitamin C (500mg daily)      Monitoring and Evaluation:   [x ] PO intake [x ] Tolerance to diet prescription [X] Weights  [X] Follow up per protocol [X] Labs:

## 2024-12-09 NOTE — PROGRESS NOTE ADULT - ATTENDING SUPERVISION STATEMENT
Resident
Resident/Student

## 2024-12-09 NOTE — PROGRESS NOTE ADULT - ASSESSMENT
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    #Failure to thrive.   - modthick liquids and puree diet by speech and swallow  -Patient can mechanically handle diet but does not have motivation  -calorie count ordered  -family decided to move foward with [facility]  -Mirtazapine and marinol  added to increase appetite     #left arm cool to touch  -US arterial and venous benign     #Type II MI, possibly demand ischemia, resolved  - Trops 56-->53 --> 51 --> 50  - EKG shows V2-V6 t wave inversion   - TTE shows EF of 54% with no wall motion abnormalities and grade 1 dystolic dysfunction. Previous Echo in 7.24 with EF of 74 %  - restarted home cardiac meds   - telemetry discontinued    Hypernatremia,  -will give IV NS when patient is sedated enough to give IV fluids.   NS Stopped and changed to D5 NS. Hypotonic fluids for hypernatremia     Bradycardia  -EKG similar to previous  -continue metoprolol    Mechanical fall w/ subsequent nasal bone fractures  - CT reviewed  - PT  - Analgesics as needed   - Fall and aspiration precautions     Acute metabolic encephalopathy likely 2/2 Vascular Dementia  - Suspect part of decline in mental status related to recent stroke/aneurysm repair  - Per family pts mental status has been declining over the past few weeks but significantly worse recently   - No evidence of acute infection and UA negative for UTI   - Pt s/p fall w/ head trauma and initial CTH negative for ICH   - CXR w/out consolidation/infiltrate   - Dehydrated on exam which can contribute to delirium given occasional IVF  - Concern for dysphagia given mental status  - Fall and aspiration precautions   - PRN IM zyprexa if needed for severe agitation  -Avoid benzos  - adhere to strict hospital delirium precautions, escalate admission to room  cont Seroquel qhs  - Seroquel added    Mild normocytic anemia, resolved  - Baseline H/H unknown    (per Dr. 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  Trace filling of the aneurysm towards the upper aspect of the stent noted.   - Can not have MRI as per family bc pt has Rt eye implant that is not MRI compatible  - CXR negative  - restarted DAPT  - home meds restarted    DVT prophylaxis: restarted heparin    Pending placement

## 2024-12-09 NOTE — PROGRESS NOTE ADULT - PROVIDER SPECIALTY LIST ADULT
Hospitalist
Internal Medicine
Palliative Care
Internal Medicine
Cardiology
Hospitalist
Internal Medicine
Cardiology
Palliative Care

## 2024-12-09 NOTE — PROGRESS NOTE ADULT - SUBJECTIVE AND OBJECTIVE BOX
Patient is a 69y old  Male who presents with a chief complaint of AMS (09 Dec 2024 07:23)      INTERVAL HPI/OVERNIGHT EVENTS:  - No acute overnight events    TODAY:  - Patient examined bedside, no complaints. Patient denies CP, SOB, fever, nausea, vomiting    MEDICATIONS  (STANDING):  aspirin  chewable 81 milliGRAM(s) Oral daily  atorvastatin 40 milliGRAM(s) Oral at bedtime  baclofen 5 milliGRAM(s) Oral every 12 hours  ceFAZolin  Injectable. 1000 milliGRAM(s) IV Push every 8 hours  ceFAZolin  Injectable.      dextrose 5% + sodium chloride 0.45%. 1000 milliLiter(s) (75 mL/Hr) IV Continuous <Continuous>  dronabinol 2.5 milliGRAM(s) Oral two times a day  enoxaparin Injectable 40 milliGRAM(s) SubCutaneous every 24 hours  famotidine    Tablet 20 milliGRAM(s) Oral two times a day  losartan 25 milliGRAM(s) Oral daily  metoprolol succinate ER 25 milliGRAM(s) Oral daily  mirtazapine 7.5 milliGRAM(s) Oral daily  pantoprazole    Tablet 40 milliGRAM(s) Oral before breakfast  QUEtiapine 25 milliGRAM(s) Oral at bedtime  sertraline 50 milliGRAM(s) Oral daily  ticagrelor 90 milliGRAM(s) Oral every 12 hours    MEDICATIONS  (PRN):  acetaminophen     Tablet .. 650 milliGRAM(s) Oral every 6 hours PRN Temp greater or equal to 38C (100.4F), Mild Pain (1 - 3)  aluminum hydroxide/magnesium hydroxide/simethicone Suspension 30 milliLiter(s) Oral every 4 hours PRN Dyspepsia  melatonin 3 milliGRAM(s) Oral at bedtime PRN Insomnia  OLANZapine Injectable 5 milliGRAM(s) IntraMuscular every 6 hours PRN agitation  ondansetron Injectable 4 milliGRAM(s) IV Push every 8 hours PRN Nausea and/or Vomiting      Allergies    No Known Allergies    Intolerances        REVIEW OF SYSTEMS:  as above      Vital Signs Last 24 Hrs  T(C): 36.4 (09 Dec 2024 09:25), Max: 36.6 (08 Dec 2024 16:08)  T(F): 97.5 (09 Dec 2024 09:25), Max: 97.8 (08 Dec 2024 16:08)  HR: 74 (09 Dec 2024 09:25) (60 - 74)  BP: 163/92 (09 Dec 2024 09:25) (139/80 - 163/92)  BP(mean): --  RR: 18 (09 Dec 2024 09:25) (17 - 18)  SpO2: 92% (09 Dec 2024 09:25) (92% - 99%)    Parameters below as of 09 Dec 2024 09:25  Patient On (Oxygen Delivery Method): room air        PHYSICAL EXAM:  CONSTITUTIONAL: NAD, appears stated age  ENMT: Moist oral mucosa, no pharyngeal injection or exudates; normal dentition  RESPIRATORY: Normal respiratory effort; clear to auscultation bilaterally  CARDIOVASCULAR: Regular rate and rhythm, normal S1 and S2, no murmur/rub/gallop; Peripheral pulses are 2+ bilaterally  ABDOMEN: Nontender to palpation, normoactive bowel sounds, no rebound/guarding;   MUSCLOSKELETAL:  No clubbing or cyanosis of digits; no joint swelling or tenderness to palpation  PSYCH: A+O to person only ; affect appropriate  NEUROLOGY: CN 2-12 are intact and symmetric; no gross sensory deficits;   SKIN: No rashes; no palpable lesions    LABS:                        12.5   8.01  )-----------( 283      ( 09 Dec 2024 05:45 )             37.5     12-09    147[H]  |  110[H]  |  15.4  ----------------------------<  87  3.5   |  22.0  |  0.61    Ca    9.6      09 Dec 2024 05:45  Mg     2.1     12-08        Urinalysis Basic - ( 09 Dec 2024 05:45 )    Color: x / Appearance: x / SG: x / pH: x  Gluc: 87 mg/dL / Ketone: x  / Bili: x / Urobili: x   Blood: x / Protein: x / Nitrite: x   Leuk Esterase: x / RBC: x / WBC x   Sq Epi: x / Non Sq Epi: x / Bacteria: x      CAPILLARY BLOOD GLUCOSE          RADIOLOGY & ADDITIONAL TESTS:    Imaging Personally Reviewed:  [X] YES  [ ] NO    Consultant(s) Notes Reviewed:  [X] YES  [ ] NO    Care Discussed with Consultants/Other Providers [X] YES  [ ] NO    Plan of Care discussed with House Staff: [X]YES [ ] NO

## 2024-12-09 NOTE — PROGRESS NOTE ADULT - ATTENDING COMMENTS
Failure to thrive   Acute metabolic encephalopathy likely 2/2 Vascular Dementia/ progressive / advanced   Type II MI, possibly demand ischemia   Hypernatremia  Bradycardia  Mechanical fall w/ subsequent nasal bone fractures  dysphagia   Mild normocytic anemia/ likely chronic   Left forearm cellulitis    - c/w mirtazapine, marinol   Add Seroquel at bedtime as patient required prn zyprexa last night   cefazolin for cellulitis  [facility] when stable

## 2024-12-10 ENCOUNTER — TRANSCRIPTION ENCOUNTER (OUTPATIENT)
Age: 69
End: 2024-12-10

## 2024-12-10 VITALS
OXYGEN SATURATION: 96 % | HEART RATE: 64 BPM | DIASTOLIC BLOOD PRESSURE: 94 MMHG | SYSTOLIC BLOOD PRESSURE: 152 MMHG | TEMPERATURE: 98 F | RESPIRATION RATE: 18 BRPM

## 2024-12-10 LAB
ALBUMIN SERPL ELPH-MCNC: 3.3 G/DL — SIGNIFICANT CHANGE UP (ref 3.3–5.2)
ALP SERPL-CCNC: 100 U/L — SIGNIFICANT CHANGE UP (ref 40–120)
ALT FLD-CCNC: 30 U/L — SIGNIFICANT CHANGE UP
ANION GAP SERPL CALC-SCNC: 14 MMOL/L — SIGNIFICANT CHANGE UP (ref 5–17)
AST SERPL-CCNC: 24 U/L — SIGNIFICANT CHANGE UP
BASOPHILS # BLD AUTO: 0.05 K/UL — SIGNIFICANT CHANGE UP (ref 0–0.2)
BASOPHILS NFR BLD AUTO: 0.8 % — SIGNIFICANT CHANGE UP (ref 0–2)
BILIRUB SERPL-MCNC: 0.4 MG/DL — SIGNIFICANT CHANGE UP (ref 0.4–2)
BUN SERPL-MCNC: 14.2 MG/DL — SIGNIFICANT CHANGE UP (ref 8–20)
CALCIUM SERPL-MCNC: 9.2 MG/DL — SIGNIFICANT CHANGE UP (ref 8.4–10.5)
CHLORIDE SERPL-SCNC: 107 MMOL/L — SIGNIFICANT CHANGE UP (ref 96–108)
CO2 SERPL-SCNC: 24 MMOL/L — SIGNIFICANT CHANGE UP (ref 22–29)
CREAT SERPL-MCNC: 0.52 MG/DL — SIGNIFICANT CHANGE UP (ref 0.5–1.3)
EGFR: 110 ML/MIN/1.73M2 — SIGNIFICANT CHANGE UP
EOSINOPHIL # BLD AUTO: 0.06 K/UL — SIGNIFICANT CHANGE UP (ref 0–0.5)
EOSINOPHIL NFR BLD AUTO: 1 % — SIGNIFICANT CHANGE UP (ref 0–6)
GLUCOSE SERPL-MCNC: 96 MG/DL — SIGNIFICANT CHANGE UP (ref 70–99)
HCT VFR BLD CALC: 35.6 % — LOW (ref 39–50)
HGB BLD-MCNC: 11.9 G/DL — LOW (ref 13–17)
IMM GRANULOCYTES NFR BLD AUTO: 0.3 % — SIGNIFICANT CHANGE UP (ref 0–0.9)
LYMPHOCYTES # BLD AUTO: 2.01 K/UL — SIGNIFICANT CHANGE UP (ref 1–3.3)
LYMPHOCYTES # BLD AUTO: 33.2 % — SIGNIFICANT CHANGE UP (ref 13–44)
MAGNESIUM SERPL-MCNC: 2 MG/DL — SIGNIFICANT CHANGE UP (ref 1.8–2.6)
MCHC RBC-ENTMCNC: 30 PG — SIGNIFICANT CHANGE UP (ref 27–34)
MCHC RBC-ENTMCNC: 33.4 G/DL — SIGNIFICANT CHANGE UP (ref 32–36)
MCV RBC AUTO: 89.7 FL — SIGNIFICANT CHANGE UP (ref 80–100)
MONOCYTES # BLD AUTO: 0.54 K/UL — SIGNIFICANT CHANGE UP (ref 0–0.9)
MONOCYTES NFR BLD AUTO: 8.9 % — SIGNIFICANT CHANGE UP (ref 2–14)
NEUTROPHILS # BLD AUTO: 3.37 K/UL — SIGNIFICANT CHANGE UP (ref 1.8–7.4)
NEUTROPHILS NFR BLD AUTO: 55.8 % — SIGNIFICANT CHANGE UP (ref 43–77)
PHOSPHATE SERPL-MCNC: 3 MG/DL — SIGNIFICANT CHANGE UP (ref 2.4–4.7)
PLATELET # BLD AUTO: 269 K/UL — SIGNIFICANT CHANGE UP (ref 150–400)
POTASSIUM SERPL-MCNC: 3.7 MMOL/L — SIGNIFICANT CHANGE UP (ref 3.5–5.3)
POTASSIUM SERPL-SCNC: 3.7 MMOL/L — SIGNIFICANT CHANGE UP (ref 3.5–5.3)
PROT SERPL-MCNC: 6 G/DL — LOW (ref 6.6–8.7)
RBC # BLD: 3.97 M/UL — LOW (ref 4.2–5.8)
RBC # FLD: 14.2 % — SIGNIFICANT CHANGE UP (ref 10.3–14.5)
SODIUM SERPL-SCNC: 145 MMOL/L — SIGNIFICANT CHANGE UP (ref 135–145)
WBC # BLD: 6.05 K/UL — SIGNIFICANT CHANGE UP (ref 3.8–10.5)
WBC # FLD AUTO: 6.05 K/UL — SIGNIFICANT CHANGE UP (ref 3.8–10.5)

## 2024-12-10 PROCEDURE — 97110 THERAPEUTIC EXERCISES: CPT

## 2024-12-10 PROCEDURE — 70498 CT ANGIOGRAPHY NECK: CPT | Mod: MC

## 2024-12-10 PROCEDURE — 72125 CT NECK SPINE W/O DYE: CPT | Mod: MC

## 2024-12-10 PROCEDURE — 84484 ASSAY OF TROPONIN QUANT: CPT

## 2024-12-10 PROCEDURE — 85610 PROTHROMBIN TIME: CPT

## 2024-12-10 PROCEDURE — 81001 URINALYSIS AUTO W/SCOPE: CPT

## 2024-12-10 PROCEDURE — 80053 COMPREHEN METABOLIC PANEL: CPT

## 2024-12-10 PROCEDURE — 90715 TDAP VACCINE 7 YRS/> IM: CPT

## 2024-12-10 PROCEDURE — 85027 COMPLETE CBC AUTOMATED: CPT

## 2024-12-10 PROCEDURE — 80048 BASIC METABOLIC PNL TOTAL CA: CPT

## 2024-12-10 PROCEDURE — 85025 COMPLETE CBC W/AUTO DIFF WBC: CPT

## 2024-12-10 PROCEDURE — 36415 COLL VENOUS BLD VENIPUNCTURE: CPT

## 2024-12-10 PROCEDURE — 92526 ORAL FUNCTION THERAPY: CPT

## 2024-12-10 PROCEDURE — 74230 X-RAY XM SWLNG FUNCJ C+: CPT

## 2024-12-10 PROCEDURE — 92610 EVALUATE SWALLOWING FUNCTION: CPT

## 2024-12-10 PROCEDURE — 93931 UPPER EXTREMITY STUDY: CPT

## 2024-12-10 PROCEDURE — 70486 CT MAXILLOFACIAL W/O DYE: CPT | Mod: MC

## 2024-12-10 PROCEDURE — 87086 URINE CULTURE/COLONY COUNT: CPT

## 2024-12-10 PROCEDURE — 83036 HEMOGLOBIN GLYCOSYLATED A1C: CPT

## 2024-12-10 PROCEDURE — 99239 HOSP IP/OBS DSCHRG MGMT >30: CPT

## 2024-12-10 PROCEDURE — 83735 ASSAY OF MAGNESIUM: CPT

## 2024-12-10 PROCEDURE — 92611 MOTION FLUOROSCOPY/SWALLOW: CPT

## 2024-12-10 PROCEDURE — 70496 CT ANGIOGRAPHY HEAD: CPT | Mod: MC

## 2024-12-10 PROCEDURE — 93971 EXTREMITY STUDY: CPT

## 2024-12-10 PROCEDURE — 85730 THROMBOPLASTIN TIME PARTIAL: CPT

## 2024-12-10 PROCEDURE — 83605 ASSAY OF LACTIC ACID: CPT

## 2024-12-10 PROCEDURE — 99285 EMERGENCY DEPT VISIT HI MDM: CPT | Mod: 25

## 2024-12-10 PROCEDURE — 93306 TTE W/DOPPLER COMPLETE: CPT

## 2024-12-10 PROCEDURE — 71045 X-RAY EXAM CHEST 1 VIEW: CPT

## 2024-12-10 PROCEDURE — 84100 ASSAY OF PHOSPHORUS: CPT

## 2024-12-10 PROCEDURE — 70450 CT HEAD/BRAIN W/O DYE: CPT | Mod: MC

## 2024-12-10 PROCEDURE — 80061 LIPID PANEL: CPT

## 2024-12-10 PROCEDURE — 97530 THERAPEUTIC ACTIVITIES: CPT

## 2024-12-10 PROCEDURE — 93005 ELECTROCARDIOGRAM TRACING: CPT

## 2024-12-10 RX ORDER — CEPHALEXIN 500 MG
500 CAPSULE ORAL EVERY 6 HOURS
Refills: 0 | Status: DISCONTINUED | OUTPATIENT
Start: 2024-12-10 | End: 2024-12-10

## 2024-12-10 RX ORDER — CEPHALEXIN 500 MG
1 CAPSULE ORAL
Qty: 20 | Refills: 0
Start: 2024-12-10 | End: 2024-12-14

## 2024-12-10 RX ADMIN — METOPROLOL TARTRATE 25 MILLIGRAM(S): 100 TABLET, FILM COATED ORAL at 05:27

## 2024-12-10 RX ADMIN — LOSARTAN POTASSIUM 25 MILLIGRAM(S): 100 TABLET, FILM COATED ORAL at 05:28

## 2024-12-10 RX ADMIN — Medication 500 MILLIGRAM(S): at 12:25

## 2024-12-10 RX ADMIN — MIRTAZAPINE 7.5 MILLIGRAM(S): 15 TABLET, FILM COATED ORAL at 12:25

## 2024-12-10 RX ADMIN — FAMOTIDINE 20 MILLIGRAM(S): 20 TABLET, FILM COATED ORAL at 05:28

## 2024-12-10 RX ADMIN — Medication 2.5 MILLIGRAM(S): at 05:28

## 2024-12-10 RX ADMIN — TICAGRELOR 90 MILLIGRAM(S): 90 TABLET ORAL at 05:28

## 2024-12-10 RX ADMIN — Medication 81 MILLIGRAM(S): at 12:25

## 2024-12-10 RX ADMIN — Medication 5 MILLIGRAM(S): at 05:29

## 2024-12-10 RX ADMIN — SERTRALINE HYDROCHLORIDE 50 MILLIGRAM(S): 100 TABLET, FILM COATED ORAL at 12:25

## 2024-12-10 RX ADMIN — ENOXAPARIN SODIUM 40 MILLIGRAM(S): 30 INJECTION SUBCUTANEOUS at 12:25

## 2024-12-10 NOTE — DISCHARGE NOTE NURSING/CASE MANAGEMENT/SOCIAL WORK - FINANCIAL ASSISTANCE
[facility] provides services at a reduced cost to those who are determined to be eligible through [facility]’s financial assistance program. Information regarding [facility]’s financial assistance program can be found by going to [email or web address] or by calling [phone].

## 2024-12-10 NOTE — DISCHARGE NOTE NURSING/CASE MANAGEMENT/SOCIAL WORK - NSDCVIVACCINE_GEN_ALL_CORE_FT
Tdap; 24-Nov-2024 15:14; Kirti Castillo (RN); [employer]; [id number] (Exp. Date: 24-Nov-2024); IntraMuscular; Deltoid Right.; 0.5 milliLiter(s); VIS (VIS Published: 09-May-2013, VIS Presented: 24-Nov-2024);

## 2024-12-10 NOTE — DISCHARGE NOTE NURSING/CASE MANAGEMENT/SOCIAL WORK - PATIENT PORTAL LINK FT
You can access the FollowMyHealth Patient Portal offered by [facility] by registering at the following website: [email or web address]. By joining [employer]’s FollowMyHealth portal, you will also be able to view your health information using other applications (apps) compatible with our system.

## 2024-12-15 ENCOUNTER — INPATIENT (INPATIENT)
Facility: HOSPITAL | Age: 69
LOS: 15 days | Discharge: NURSING, REHAB OR LONG-TERM CARE | DRG: 93 | End: 2024-12-31
Attending: FAMILY MEDICINE | Admitting: HOSPITALIST
Payer: COMMERCIAL

## 2024-12-15 VITALS
OXYGEN SATURATION: 97 % | HEART RATE: 51 BPM | HEIGHT: 65 IN | DIASTOLIC BLOOD PRESSURE: 77 MMHG | TEMPERATURE: 98 F | SYSTOLIC BLOOD PRESSURE: 147 MMHG | RESPIRATION RATE: 16 BRPM

## 2024-12-15 DIAGNOSIS — Z98.890 OTHER SPECIFIED POSTPROCEDURAL STATES: Chronic | ICD-10-CM

## 2024-12-15 DIAGNOSIS — R47.01 APHASIA: ICD-10-CM

## 2024-12-15 LAB
ALBUMIN SERPL ELPH-MCNC: 2.7 G/DL — LOW (ref 3.3–5.2)
ALP SERPL-CCNC: 73 U/L — SIGNIFICANT CHANGE UP (ref 40–120)
ALT FLD-CCNC: 39 U/L — SIGNIFICANT CHANGE UP
ANION GAP SERPL CALC-SCNC: 9 MMOL/L — SIGNIFICANT CHANGE UP (ref 5–17)
APTT BLD: 28.9 SEC — SIGNIFICANT CHANGE UP (ref 24.5–35.6)
AST SERPL-CCNC: 29 U/L — SIGNIFICANT CHANGE UP
BASOPHILS # BLD AUTO: 0.03 K/UL — SIGNIFICANT CHANGE UP (ref 0–0.2)
BASOPHILS NFR BLD AUTO: 0.6 % — SIGNIFICANT CHANGE UP (ref 0–2)
BILIRUB SERPL-MCNC: 0.4 MG/DL — SIGNIFICANT CHANGE UP (ref 0.4–2)
BLD GP AB SCN SERPL QL: SIGNIFICANT CHANGE UP
BUN SERPL-MCNC: 17 MG/DL — SIGNIFICANT CHANGE UP (ref 8–20)
CALCIUM SERPL-MCNC: 7.5 MG/DL — LOW (ref 8.4–10.5)
CHLORIDE SERPL-SCNC: 103 MMOL/L — SIGNIFICANT CHANGE UP (ref 96–108)
CO2 SERPL-SCNC: 25 MMOL/L — SIGNIFICANT CHANGE UP (ref 22–29)
CREAT SERPL-MCNC: 0.45 MG/DL — LOW (ref 0.5–1.3)
EGFR: 115 ML/MIN/1.73M2 — SIGNIFICANT CHANGE UP
EOSINOPHIL # BLD AUTO: 0.06 K/UL — SIGNIFICANT CHANGE UP (ref 0–0.5)
EOSINOPHIL NFR BLD AUTO: 1.2 % — SIGNIFICANT CHANGE UP (ref 0–6)
ETHANOL SERPL-MCNC: <10 MG/DL — SIGNIFICANT CHANGE UP (ref 0–9)
GLUCOSE SERPL-MCNC: 89 MG/DL — SIGNIFICANT CHANGE UP (ref 70–99)
HCT VFR BLD CALC: 31.9 % — LOW (ref 39–50)
HGB BLD-MCNC: 10.6 G/DL — LOW (ref 13–17)
IMM GRANULOCYTES NFR BLD AUTO: 0.2 % — SIGNIFICANT CHANGE UP (ref 0–0.9)
INR BLD: 1.05 RATIO — SIGNIFICANT CHANGE UP (ref 0.85–1.16)
LYMPHOCYTES # BLD AUTO: 1.39 K/UL — SIGNIFICANT CHANGE UP (ref 1–3.3)
LYMPHOCYTES # BLD AUTO: 27.9 % — SIGNIFICANT CHANGE UP (ref 13–44)
MCHC RBC-ENTMCNC: 30.2 PG — SIGNIFICANT CHANGE UP (ref 27–34)
MCHC RBC-ENTMCNC: 33.2 G/DL — SIGNIFICANT CHANGE UP (ref 32–36)
MCV RBC AUTO: 90.9 FL — SIGNIFICANT CHANGE UP (ref 80–100)
MONOCYTES # BLD AUTO: 0.54 K/UL — SIGNIFICANT CHANGE UP (ref 0–0.9)
MONOCYTES NFR BLD AUTO: 10.8 % — SIGNIFICANT CHANGE UP (ref 2–14)
NEUTROPHILS # BLD AUTO: 2.96 K/UL — SIGNIFICANT CHANGE UP (ref 1.8–7.4)
NEUTROPHILS NFR BLD AUTO: 59.3 % — SIGNIFICANT CHANGE UP (ref 43–77)
PLATELET # BLD AUTO: 233 K/UL — SIGNIFICANT CHANGE UP (ref 150–400)
POTASSIUM SERPL-MCNC: 3.3 MMOL/L — LOW (ref 3.5–5.3)
POTASSIUM SERPL-SCNC: 3.3 MMOL/L — LOW (ref 3.5–5.3)
PROT SERPL-MCNC: 4.8 G/DL — LOW (ref 6.6–8.7)
PROTHROM AB SERPL-ACNC: 12.2 SEC — SIGNIFICANT CHANGE UP (ref 9.9–13.4)
RBC # BLD: 3.51 M/UL — LOW (ref 4.2–5.8)
RBC # FLD: 14.4 % — SIGNIFICANT CHANGE UP (ref 10.3–14.5)
SODIUM SERPL-SCNC: 137 MMOL/L — SIGNIFICANT CHANGE UP (ref 135–145)
TROPONIN T, HIGH SENSITIVITY RESULT: 36 NG/L — SIGNIFICANT CHANGE UP (ref 0–51)
TROPONIN T, HIGH SENSITIVITY RESULT: 40 NG/L — SIGNIFICANT CHANGE UP (ref 0–51)
WBC # BLD: 4.99 K/UL — SIGNIFICANT CHANGE UP (ref 3.8–10.5)
WBC # FLD AUTO: 4.99 K/UL — SIGNIFICANT CHANGE UP (ref 3.8–10.5)

## 2024-12-15 PROCEDURE — 93010 ELECTROCARDIOGRAM REPORT: CPT

## 2024-12-15 PROCEDURE — 70450 CT HEAD/BRAIN W/O DYE: CPT | Mod: 26,MC,59

## 2024-12-15 PROCEDURE — 70498 CT ANGIOGRAPHY NECK: CPT | Mod: 26,MC

## 2024-12-15 PROCEDURE — 70496 CT ANGIOGRAPHY HEAD: CPT | Mod: 26,MC

## 2024-12-15 PROCEDURE — 0042T: CPT | Mod: MC

## 2024-12-15 PROCEDURE — 71045 X-RAY EXAM CHEST 1 VIEW: CPT | Mod: 26

## 2024-12-15 PROCEDURE — 99223 1ST HOSP IP/OBS HIGH 75: CPT

## 2024-12-15 PROCEDURE — 99291 CRITICAL CARE FIRST HOUR: CPT

## 2024-12-15 RX ORDER — SODIUM CHLORIDE 9 MG/ML
1000 INJECTION, SOLUTION INTRAVENOUS ONCE
Refills: 0 | Status: COMPLETED | OUTPATIENT
Start: 2024-12-15 | End: 2024-12-15

## 2024-12-15 RX ORDER — ASPIRIN 81 MG
81 TABLET, DELAYED RELEASE (ENTERIC COATED) ORAL ONCE
Refills: 0 | Status: COMPLETED | OUTPATIENT
Start: 2024-12-15 | End: 2024-12-15

## 2024-12-15 RX ADMIN — SODIUM CHLORIDE 1000 MILLILITER(S): 9 INJECTION, SOLUTION INTRAVENOUS at 19:04

## 2024-12-15 NOTE — ED PROVIDER NOTE - CLINICAL SUMMARY MEDICAL DECISION MAKING FREE TEXT BOX
69-year-old male patient presents the ED for altered mental status, code stroke activated in the ED, no acute changes, CTA is negative, patient is MRI on compatible secondary to right eye prosthetic.  As per patient's son, patient is normally able to speak and answer questions somewhat, today was found to be aphasic, unresponsive to them.  Labs, x-ray, evaluate for infectious cause of symptoms versus TIA.  On cardiac monitor, EKG shows no signs of acute ischemia or interval changes. 69-year-old male patient presents the ED for altered mental status, code stroke activated in the ED, no acute changes, CTA is negative, patient is MRI on compatible secondary to right eye prosthetic.  As per patient's son, patient is normally able to speak and answer questions somewhat, today was found to be aphasic, unresponsive to them.  Labs, x-ray, evaluate for infectious cause of symptoms versus TIA.  On cardiac monitor, EKG shows no signs of acute ischemia or interval changes.  ct with concern for subacute evolving infarct and persistent expressive aphasia that is new as per the son. will admit.

## 2024-12-15 NOTE — ED PROVIDER NOTE - PHYSICAL EXAMINATION
Const:  in no acute distress, nonverbal at baseline, following commands  HEENT: NC/AT. Moist mucous membranes  Eyes:  left eye is equal, round, reactive to light.  No nystagmus.  Cardiac: Regular rate and regular rhythm. +S1/S2. No murmurs, rubs or gallops.   Resp: breath sounds equal and clear bilaterally. No wheezes, rales or rhonchi.  Abd: Soft, non-tender, non-distended.  No guarding or rebound.  MSK:   No signs of external trauma  Neuro:  0 out of 5 strength, right upper and right lower extremity.  Patient aphasia, at baseline, will follow commands.  4-5 strength,  left upper and left lower extremity.  Difficult to assess rest of neuroexam secondary to patient's baseline deficits from prior stroke

## 2024-12-15 NOTE — ED PROVIDER NOTE - PROGRESS NOTE DETAILS
spoke with attending radiologist, CT head noncontrast shows no acute intracranial hemorrhage, no change in prior aneurysm, possible slight increase in density in right side of brain, pending rest of perfusion and CTA studies Had at length discussion with patient's sons at bedside, states that patient was at his baseline last night, is not normally completely aphasic, found him this morning aphasic, not responding to them.  Was told by nursing home staff that he had a fall last night, due to concern he was sent to the ED for further evaluation.

## 2024-12-15 NOTE — ED ADULT TRIAGE NOTE - HEIGHT IN CM
[facility]  Hospitalist Progress Note  Jules Welch MD  08/19/2022    Assessment & Plan   Nathan Macias is a 89 year old male with a history of afib on Eliquis who was brought to the ED with dysarthria, aphasia and ataxia and noted to have an acute ischemic stroke of the RIGHT cerebellum. He is admitted for further evaluation and management.      Acute Ischemic stroke of RIGHT cerebellum  - TTE with normal EF, indeterminate diastolic function and neg bubble.  - Expressive aphasia (persistent), not a candidate for stroke intervention.  - Etiology of stroke is likely large artery atherosclerosis and R vertebral artery stenosis, artery to artery embolism. No evidence of malignancy on screen.   - Exam per neuro;  dysarthria, decreased JAGDISH in RUE and RLE, ataxia with finger to nose and heel to shin with RUE and RLE. normal on the left  - dysarthria and ataxia with very slow improvement, Strength intact. Taking po.   - sitter since fall on 8/6  - head ct post fall stable.   - Continue PTA statin. Cont PtA dose as LDL 40   - Cont PTA BP meds, eliquis  - Secondary stroke prevention with apixaban + ASA 81 mg daily x90 days, then just apixaban after that.   - neurochecks q4H   - PT/OT/SLP recommending TCU  - Long term goals < 130/80, LDL goal <70, A1c goal <7  - Follow up with general neurology in 6-8 weeks   - repeat CTH non contrast showed continued evolution of right cerebellar infarction       Delirium  Probable underlyng cognitive issues.  Wife reports that the patient has been having issues with short-term memories for about a year (forgetting keys and groceries etc).  Likely PTA cognitive impairment  -some agitation several days ago. Needed sitter started.   -needed prn zyprex aon 8/2 and 8/3.   Impulsive. Pulled out iv, pulled at catheter.   8/4 No agitation, cooperative. Remains impulsive at times.   ucx ngtd  Cefuroxime for possible uti- stopped given ucx ngt   -add qpm  meletonin  - Delirium bundle, decrease frequency of vitals and telemetry  - Feliz as below for retention.  - Formal cognitive testing as outpatient.  - had another fall night on 8/17   - zyprexa discontinued and started on seroquel at bedtime and prn.    - called and updated his wife.    Unwitnessed fall 8/6 and 8/17  No neuro changes. No change in status prefall  Bed alarm not on or not working  Pt is ataxic from stroke  No injury. Hb stable  Bun.cr elevated, given fluid bolus and maint fluids will SL  -Cr better with fluids  Had fall night of 8/17, seen by house ashleigh, labs stable, CTH showed nothing acute       HERNAN.   prerenal  Hb stable.   Held ace inh and hydrochlorothiazide  Given 500cc fluid bolus and IVF,now off IVF  Cr at baseline   Discontinue hydrochlorothiazide in the future     Atrial fibrillation  - continue BB and apixaban.     DMII. A1C is 7.1.   range today range.   - hold metformin while inpatient  - sliding scale insulin  - added prandial aspart --> increased to 1 unit per 10 gram carbs 8/6, adjust as needed.      Hypertension  Good control  - Resumed PTA meds.  - Prn hydralazine.  - discontinue hydrochlorothiazide and resume lisinopril given HERNAN after discharge  - consider dose reduction of lisinopril  - BP stable on metoprolol 25 mg daily.    Cough  Sore throat:  No infection seen on CT chest.Neg for strep AG.  Afebrile.   - Cepacol. Prn.  - Prn antitussives.  - IS.    Prostatomegaly on CT (unsure if new but was on flomax PTA).  Urinary retention causing frequent cath.  Feliz traums 8/15  Hx of tx for bph and LUTS for 2 years with flomax  UA 8/3: 37 wbc, 176 rbc  - Indewelling feliz.  - Continue flomax.  - Bladder scan given confusion.  - Urology consult. Seen 8/3, rec feliz and consider voiding trial in several days. Cont flomax. CT scan reviewed and agree with radiology interpretation . No indication for urgent urologic intervention. Urology signed off  - follow up ucx shows no growth  -  "consider voiding trial in 2-3 days, if discharge with feliz outpatient urology follow up  [facility]rology or at [facility] urology  - pulled at catheter. Some blood in feliz. No clots. RN to flush, follow closely.   -8/5, still with bloody urine, ucx ngtd.    - 8/10 UA abnormal, UCx negative, discontinue empiric iv ceftriaxone, keep penile tip around feliz catheter clean.  - patient noted to have coke color urine, ua + hematuria  - hgb down 14.5 > 13.3    Pulmonary nodule: 3mm, accidental.  - Repeat CT in 12 months.  - pcp follow up      DVT Prophylaxis: apixaban.    Code Status: Full Code     Disposition:   -- will likely need memory care vs TCU    Interval History   -- mitt placed on left hand due to patient picking at feliz  -- discussed with RN  -- hematuria clearing up    -Data reviewed today: I reviewed all new labs and imaging over the last 24 hours. I personally reviewed no images or EKG's today.    Physical Exam    , Blood pressure (!) 149/85, pulse 92, temperature 97.2  F (36.2  C), temperature source Axillary, resp. rate 16, height 1.715 m (5' 7.5\"), weight 84 kg (185 lb 3 oz), SpO2 96 %.  Vitals:    08/01/22 0944   Weight: 84 kg (185 lb 3 oz)     Vital Signs with Ranges  Temp:  [97  F (36.1  C)-97.5  F (36.4  C)] 97.2  F (36.2  C)  Pulse:  [63-92] 92  Resp:  [16-20] 16  BP: ()/(46-85) 149/85  SpO2:  [95 %-98 %] 96 %  I/O's Last 24 hours  I/O last 3 completed shifts:  In: 260 [P.O.:260]  Out: 950 [Urine:950]    Constitutional:  Mildly sedated but arouses, speech a bit more clear and less dysarthric  Respiratory: Clear to auscultation bilaterally, no crackles or wheezing  Cardiovascular: Regular rate and rhythm, normal S1 and S2   GI: Normal bowel sounds, soft, non-distended, non-tender  Skin/Integumen: No rashes, no cyanosis, no edema  Other: Bilateral UE ataxia, dysarthria     Medications   All medications were reviewed.    - MEDICATION INSTRUCTIONS -       - MEDICATION INSTRUCTIONS -       - MEDICATION " INSTRUCTIONS -         apixaban ANTICOAGULANT  5 mg Oral BID     aspirin  81 mg Oral Daily     atorvastatin  10 mg Oral or Feeding Tube QPM     [Held by provider] hydrochlorothiazide  25 mg Oral Daily     insulin aspart   Subcutaneous TID w/meals     insulin aspart  1-3 Units Subcutaneous TID AC     insulin aspart  1-3 Units Subcutaneous At Bedtime     lisinopril  20 mg Oral Daily     metoprolol succinate ER  25 mg Oral Daily     QUEtiapine  50 mg Oral At Bedtime     sodium chloride (PF)  3 mL Intracatheter Q8H     tamsulosin  0.4 mg Oral Daily        Data   Recent Labs   Lab 08/19/22  0743 08/19/22  0205 08/18/22  2113 08/18/22  0841 08/18/22  0218 08/17/22  0814 08/17/22  0755 08/16/22  1659 08/16/22  1305 08/14/22  0747 08/14/22  0606   WBC  --   --   --   --  9.0  --  8.6  --  6.2  --  7.8   HGB  --   --   --   --  13.1*  --  13.3  --  14.5  --  14.3   MCV  --   --   --   --  94  --  94  --  94  --  92   PLT  --   --   --   --  226  --  295  --  233  --  147*   NA  --   --   --   --  140  --  138  --   --   --  137   POTASSIUM  --   --   --   --  4.7  --  4.7  --   --   --  4.8   CHLORIDE  --   --   --   --  109  --  108  --   --   --  106   CO2  --   --   --   --  25  --  27  --   --   --  25   BUN  --   --   --   --  66*  --  50*  --   --   --  43*   CR  --   --   --   --  1.42*  --  1.08  --   --   --  1.05   ANIONGAP  --   --   --   --  6  --  3  --   --   --  6   MAURICIO  --   --   --   --  8.7  --  8.9  --   --   --  8.8   * 156* 149*   < > 180*   < > 213*   < >  --    < > 171*    < > = values in this interval not displayed.       No results found for this or any previous visit (from the past 24 hour(s)).    Jules Welch MD  Text Page  (7am to 6pm)        165.1

## 2024-12-15 NOTE — ED ADULT NURSE NOTE - NSICDXPASTMEDICALHX_GEN_ALL_CORE_FT
No PAST MEDICAL HISTORY:  CVA (cerebrovascular accident)     H/O aneurysm     HTN (hypertension)

## 2024-12-15 NOTE — H&P ADULT - NSHPPHYSICALEXAM_GEN_ALL_CORE
GENERAL: Laying on left side, shoving examiner away with RUE  HEENT: NC/AT, dry oral mucosa, clear conjunctiva, sclera nonicteric  RESPIRATORY: Normal respiratory effort, no wheezing, rhonchi, rales  CARDIOVASCULAR: RRR, normal S1 and S2, no murmur/rub/gallop  ABDOMEN: soft, NT/ND, + bowel sounds, no rebound/guarding  MSK: No joint deformities, edema, erythema  EXTREMITIES: No cynaosis, no clubbing, no lower extremity edema  PSYCH: restless, intermittently impulsive, following some commands but othwerise non cooperative   NEUROLOGY: Awake and alert but not cooperating with exam. Moves RUE and RLE but is not observed to be moving left side     ssess if oriented, pt nonverbal, Rt sided paralysis, moving LUE and LLE spontaneously, strength difficult to assess as only intermittently following simple commands  SKIN: poor turgor GENERAL: Laying on left side, shoving examiner away with LUE  HEENT: NC/AT, dry oral mucosa, clear conjunctiva, sclera nonicteric  RESPIRATORY: Normal respiratory effort, no wheezing, rhonchi, rales  CARDIOVASCULAR: RRR, normal S1 and S2, no murmur/rub/gallop  ABDOMEN: soft, NT/ND, + bowel sounds, no rebound/guarding  MSK: RUE with wrist contracture   EXTREMITIES: No cyanosis, no clubbing, no lower extremity edema  PSYCH: restless, intermittently impulsive, following some commands but otherwise non cooperative   NEUROLOGY: Awake and alert but not cooperating with exam. LUE but not right. Moves both left and Right lower extremities but RLE clinically weaker LLE   SKIN: poor turgor

## 2024-12-15 NOTE — ED ADULT NURSE NOTE - NSFALLRISKINTERV_ED_ALL_ED

## 2024-12-15 NOTE — ED PROVIDER NOTE - NIH STROKE SCALE: 5B. MOTOR ARM, RIGHT, QM
(4) No movement Enbrel Counseling:  I discussed with the patient the risks of etanercept including but not limited to myelosuppression, immunosuppression, autoimmune hepatitis, demyelinating diseases, lymphoma, and infections.  The patient understands that monitoring is required including a PPD at baseline and must alert us or the primary physician if symptoms of infection or other concerning signs are noted.

## 2024-12-15 NOTE — ED ADULT TRIAGE NOTE - CHIEF COMPLAINT QUOTE
Pt arrives via [facility], pt had non-con, con CT scans, pt was found by the son today, unsure of when the last known well time was since it was sometime yesterday that he was last seen and the time is unclear of when, pt was found on the floor at home, aphasic, right sided weakness, and lethargy, pt has a hx of a basilar bleed that left him with left sided deficits. EMS reports that pt had some facial drooping on arrival to the home. Code stroke called.

## 2024-12-15 NOTE — H&P ADULT - HISTORY OF PRESENT ILLNESS
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 In the ER he was noted to be moving his left side but not his right and as such stroke code was activated. CT imaging notable for possible enlarging Rt lateral cerebellar subacute infarction (when compared to 11/25)   On exam pt is laying on his left side, waving his right hand and pushing me away. Refusing to be [id number]. Does nod when asked if he knows where he is but is not communicating verbally.   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 In the ER he was noted to be moving his left side but not his right and as such stroke code was activated. CT imaging notable for possible enlarging Rt lateral cerebellar subacute infarction (when compared to 11/25)   On my exam pt is laying on his left side, waving his right hand and pushing me away. Refusing to be [id number]. Does nod when asked if he knows where he is but is not communicating verbally.   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 and now nonverbal and w/ behavioral disturbances treated at  [facility] (6/14-7/17 [address]241) ,  a h/o Rt ocular implant (non MR compatible), GERD,  recently admitted and discharged from [facility] after being admitted for a mechanical fall (resulting in nasal fractures) / failure to thrive, mildly elevated troponin levels (deemed non cardiac)  and AMS suspected secondary to progression of vascular dementia now presenting from [facility] after son reportedly found him to have left sided weakness which is reportedly new. In the ER he was noted to be moving his left side but not his right and as such stroke code was activated. CT imaging notable for possible enlarging Rt lateral cerebellar subacute infarction (when compared to 11/25)   On my exam pt is laying on his Rt side, waving his left hand and pushing me away. Refusing to be examined in detail. Does occasionally nod when asked if he knows where he is but is not communicating verbally.

## 2024-12-15 NOTE — H&P ADULT - ASSESSMENT
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 Head imaging notable for possible enlarging Rt cerebellar subacute infarction when compared to November imaging       Admit to Medicine (overnight) for Stroke team   Subacute CVA (known) with reportedly new left sided weakness.   Per recent chart review pts deficits have been inconsistent   Reportedly able to say 2-3 words on prior admission. Currently not verbally communicating   At present is moving Left upper and lower extremities (suspected 5/5 as able to push away) but RUE is not moving (with contracture) RLE with at least 2-3/5  CT (dry) with region Rt cerebellar possible enlarging subacute infarction   Dysphagia screen (bedside). IF able to take PO safely start PO meds (was dsc on DAPT (ASA + Brilinta))  IF unable, start ASA  mg daily   IF able to take PO continue statin and Baclofen (suspected for contracture of RUE)  Hold anti HTN agents for now. Allow permissive HTN (goal  for now)  SLP eval (recent eval noted) , pt was deemed not motivated was dsc on DASH diet  Consider repeating CT imaging in 24-48 hours as cannot have MRI   Last A1C 5.9%    Suspected advance vascular dementia with behavioral changes with recent FTT  Encephalomalacia and gliosis as evident on CT unfortunately suggest progressive degeneration   Continue Seroquel and Sertraline if able to take PO  Dronabinol 2.5 mg bid     Recently mildly elevated troponin levels   Recent Echo w/o regional wall motion abnormalities   Per cardiology, Trop elevation on recent admission was not likely cardiac   Patients recent echo shows normal EF, No RWMA  Elevated trop was deemed unlikely cardiac   Currently wnl    Hypokalemia   KCl 10 meq IV x 3   Send Mg     DVT ppx : Heparin s/c   Poor overall prognosis, recommend Palliative f/u (consult placed)   Transfer to Stroke team in am

## 2024-12-15 NOTE — ED PROVIDER NOTE - ATTENDING CONTRIBUTION TO CARE
69-year-old male; with PMH significant for CVA (partially thrombosed fusiform basilar artery aneurysm with basilar artery aneurysm s/p PED x 3 complicated by PED occlusion, baseline right sided deficit and aphasia), HTN, HLD; now presenting with new onset weakness. Patient comes from SNF.  Patient was placed into bed last evening around 6pm and found on the ground at approximately 9 PM.  Patient lifted off the ground and placed back in bed at that time.  Upon awakening this morning patient had left upper and lower extremity deficit.  EMS called.  [facility] activated.  As per EMS, noncontrast CT and contrast CT head with no acute findings. patient now with return of L UE and LE strength upon arrival to ED.   General:     NAD  Head:     NC/AT, EOMI, oral mucosa moist  Neck:     trachea midline  Lungs:     CTA b/l, no w/r/r  CVS:     S1S2, RRR, no m/g/r  Abd:     +BS, s/nt/nd, no organomegaly  Ext:    2+ radial and pedal pulses, no c/c/e  Neuro: aphasia, right sided deficit  A/P:  69-year-old male; with PMH significant for CVA (partially thrombosed fusiform basilar artery aneurysm with basilar artery aneurysm s/p PED x 3 complicated by PED occlusion, baseline right sided deficit and aphasia), HTN, HLD; now presenting with new onset weakness.   code stroke activated given sx <24hours, ct perfusion and ct neck scans added

## 2024-12-15 NOTE — ED PROVIDER NOTE - NSSTROKETPAEXCLABS_HEADCT
Head CT suggesting an established acute cerebral infarction as evidenced by charlene hypodensity, regardless of size

## 2024-12-15 NOTE — ED ADULT NURSE NOTE - OBJECTIVE STATEMENT
Pt brought in by sons due to pt presenting with stroke like symptoms. LKW unknown. As per sons pt had a stroke back in September and since the has had R sided deficits. Pt is currently aphasic. Nods head to questions. Pt fell out of bed last night as per sons.

## 2024-12-15 NOTE — ED ADULT NURSE REASSESSMENT NOTE - NS ED NURSE REASSESS COMMENT FT1
Assumed care of patient at 1930, resting on stretcher in no acute distress.  NIH scores as charted, pt noted attempting to climb over side railing, remove IV without neuro changes on NIH.  Pt admitted to medicine and awaiting bed.

## 2024-12-15 NOTE — H&P ADULT - NSHPLABSRESULTS_GEN_ALL_CORE
CT head 12/15/24  IMPRESSION:  No acute hemorrhage.  Stent within an ovoid dense structure with mass effect on the amy consistent with stented basilar artery aneurysm, not significantly changed.  Loss of gray-white junction in the right lateral cerebellum which may be larger in size compared to 11/25/2024. Cannot exclude enlarging subacute infarct.  Encephalomalacia and gliosis in the right medial occipital lobe, not significantly changed.

## 2024-12-15 NOTE — ED ADULT NURSE NOTE - CHIEF COMPLAINT QUOTE
Pt arrives via [facility], pt had non-con, con CT scans, pt was found by the son today, unsure of when the last known well time was since it was sometime yesterday that he was last seen and the time is unclear of when, pt was found on the floor at home, aphasic, right sided weakness, and lethargy, pt has a hx of a basilar bleed that left him with left sided deficits. EMS reports that pt had some facial drooping on arrival to the home. Code stroke called.

## 2024-12-15 NOTE — ED PROVIDER NOTE - OBJECTIVE STATEMENT
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 Patient with right sided paralysis , Aphasia baseline presents to the ED for worsening weakness.   Patient was sent from nursing home for reported left-sided weakness, stating that last night the patient was fine,  was found by son this morning to have left-sided deficits which she normally does not have.  Patient was brought in by mobile stroke unit, CT head was negative.  In the ED, patient is moving his left arm and left leg, following commands, not moving right arm or leg.  Is able to nod yes or no to answers but does not respond with voice.  Code stroke activated in the ED

## 2024-12-16 PROBLEM — I10 ESSENTIAL (PRIMARY) HYPERTENSION: Chronic | Status: ACTIVE | Noted: 2024-11-24

## 2024-12-16 PROBLEM — I63.9 CEREBRAL INFARCTION, UNSPECIFIED: Chronic | Status: ACTIVE | Noted: 2024-11-24

## 2024-12-16 PROBLEM — Z86.79 PERSONAL HISTORY OF OTHER DISEASES OF THE CIRCULATORY SYSTEM: Chronic | Status: ACTIVE | Noted: 2024-11-24

## 2024-12-16 LAB
A1C WITH ESTIMATED AVERAGE GLUCOSE RESULT: 5.6 % — SIGNIFICANT CHANGE UP (ref 4–5.6)
AMPHET UR-MCNC: NEGATIVE — SIGNIFICANT CHANGE UP
BARBITURATES UR SCN-MCNC: NEGATIVE — SIGNIFICANT CHANGE UP
BENZODIAZ UR-MCNC: NEGATIVE — SIGNIFICANT CHANGE UP
COCAINE METAB.OTHER UR-MCNC: NEGATIVE — SIGNIFICANT CHANGE UP
ESTIMATED AVERAGE GLUCOSE: 114 MG/DL — SIGNIFICANT CHANGE UP (ref 68–114)
FENTANYL UR QL SCN: NEGATIVE — SIGNIFICANT CHANGE UP
MAGNESIUM SERPL-MCNC: 2 MG/DL — SIGNIFICANT CHANGE UP (ref 1.6–2.6)
METHADONE UR-MCNC: NEGATIVE — SIGNIFICANT CHANGE UP
OPIATES UR-MCNC: NEGATIVE — SIGNIFICANT CHANGE UP
PCP SPEC-MCNC: SIGNIFICANT CHANGE UP
PCP UR-MCNC: NEGATIVE — SIGNIFICANT CHANGE UP
THC UR QL: NEGATIVE — SIGNIFICANT CHANGE UP

## 2024-12-16 PROCEDURE — 99223 1ST HOSP IP/OBS HIGH 75: CPT

## 2024-12-16 PROCEDURE — 99222 1ST HOSP IP/OBS MODERATE 55: CPT

## 2024-12-16 RX ORDER — ASPIRIN 81 MG
300 TABLET, DELAYED RELEASE (ENTERIC COATED) ORAL DAILY
Refills: 0 | Status: DISCONTINUED | OUTPATIENT
Start: 2024-12-16 | End: 2024-12-17

## 2024-12-16 RX ORDER — TICAGRELOR 60 MG/1
90 TABLET ORAL EVERY 12 HOURS
Refills: 0 | Status: DISCONTINUED | OUTPATIENT
Start: 2024-12-16 | End: 2024-12-16

## 2024-12-16 RX ORDER — BACLOFEN 10 MG/1
5 TABLET ORAL EVERY 8 HOURS
Refills: 0 | Status: DISCONTINUED | OUTPATIENT
Start: 2024-12-16 | End: 2024-12-16

## 2024-12-16 RX ORDER — DRONABINOL 5 MG/ML
2.5 SOLUTION ORAL
Refills: 0 | Status: DISCONTINUED | OUTPATIENT
Start: 2024-12-16 | End: 2024-12-16

## 2024-12-16 RX ORDER — ASPIRIN 81 MG
81 TABLET, DELAYED RELEASE (ENTERIC COATED) ORAL DAILY
Refills: 0 | Status: DISCONTINUED | OUTPATIENT
Start: 2024-12-16 | End: 2024-12-16

## 2024-12-16 RX ORDER — POTASSIUM CHLORIDE 600 MG/1
10 TABLET, FILM COATED, EXTENDED RELEASE ORAL
Refills: 0 | Status: COMPLETED | OUTPATIENT
Start: 2024-12-16 | End: 2024-12-16

## 2024-12-16 RX ORDER — ASPIRIN 81 MG
300 TABLET, DELAYED RELEASE (ENTERIC COATED) ORAL DAILY
Refills: 0 | Status: DISCONTINUED | OUTPATIENT
Start: 2024-12-16 | End: 2024-12-16

## 2024-12-16 RX ORDER — GINKGO BILOBA 40 MG
3 CAPSULE ORAL AT BEDTIME
Refills: 0 | Status: DISCONTINUED | OUTPATIENT
Start: 2024-12-16 | End: 2024-12-16

## 2024-12-16 RX ORDER — HEPARIN SODIUM 1000 [USP'U]/ML
5000 INJECTION, SOLUTION INTRAVENOUS; SUBCUTANEOUS EVERY 12 HOURS
Refills: 0 | Status: DISCONTINUED | OUTPATIENT
Start: 2024-12-16 | End: 2024-12-31

## 2024-12-16 RX ORDER — ONDANSETRON 4 MG/1
4 TABLET ORAL EVERY 8 HOURS
Refills: 0 | Status: DISCONTINUED | OUTPATIENT
Start: 2024-12-16 | End: 2024-12-31

## 2024-12-16 RX ORDER — SERTRALINE HYDROCHLORIDE 25 MG/1
50 TABLET ORAL DAILY
Refills: 0 | Status: DISCONTINUED | OUTPATIENT
Start: 2024-12-16 | End: 2024-12-16

## 2024-12-16 RX ORDER — FAMOTIDINE 20 MG/1
20 TABLET, FILM COATED ORAL
Refills: 0 | Status: DISCONTINUED | OUTPATIENT
Start: 2024-12-16 | End: 2024-12-16

## 2024-12-16 RX ORDER — MAG HYDROX/ALUMINUM HYD/SIMETH 200-200-20
30 SUSPENSION, ORAL (FINAL DOSE FORM) ORAL EVERY 4 HOURS
Refills: 0 | Status: DISCONTINUED | OUTPATIENT
Start: 2024-12-16 | End: 2024-12-16

## 2024-12-16 RX ORDER — ATORVASTATIN CALCIUM 40 MG/1
40 TABLET, FILM COATED ORAL AT BEDTIME
Refills: 0 | Status: DISCONTINUED | OUTPATIENT
Start: 2024-12-16 | End: 2024-12-16

## 2024-12-16 RX ORDER — POTASSIUM CHLORIDE 600 MG/1
10 TABLET, FILM COATED, EXTENDED RELEASE ORAL ONCE
Refills: 0 | Status: COMPLETED | OUTPATIENT
Start: 2024-12-16 | End: 2024-12-16

## 2024-12-16 RX ORDER — ACETAMINOPHEN 80 MG/.8ML
650 SOLUTION/ DROPS ORAL EVERY 6 HOURS
Refills: 0 | Status: DISCONTINUED | OUTPATIENT
Start: 2024-12-16 | End: 2024-12-16

## 2024-12-16 RX ORDER — PANTOPRAZOLE 40 MG/1
40 TABLET, DELAYED RELEASE ORAL
Refills: 0 | Status: DISCONTINUED | OUTPATIENT
Start: 2024-12-16 | End: 2024-12-16

## 2024-12-16 RX ORDER — QUETIAPINE FUMARATE 100 MG/1
25 TABLET, FILM COATED ORAL AT BEDTIME
Refills: 0 | Status: DISCONTINUED | OUTPATIENT
Start: 2024-12-16 | End: 2024-12-16

## 2024-12-16 RX ORDER — POTASSIUM CHLORIDE 600 MG/1
40 TABLET, FILM COATED, EXTENDED RELEASE ORAL ONCE
Refills: 0 | Status: DISCONTINUED | OUTPATIENT
Start: 2024-12-16 | End: 2024-12-16

## 2024-12-16 RX ORDER — MIRTAZAPINE 7.5 MG/1
7.5 TABLET, FILM COATED ORAL DAILY
Refills: 0 | Status: DISCONTINUED | OUTPATIENT
Start: 2024-12-16 | End: 2024-12-16

## 2024-12-16 RX ADMIN — HEPARIN SODIUM 5000 UNIT(S): 1000 INJECTION, SOLUTION INTRAVENOUS; SUBCUTANEOUS at 05:11

## 2024-12-16 RX ADMIN — POTASSIUM CHLORIDE 100 MILLIEQUIVALENT(S): 600 TABLET, FILM COATED, EXTENDED RELEASE ORAL at 06:22

## 2024-12-16 RX ADMIN — POTASSIUM CHLORIDE 100 MILLIEQUIVALENT(S): 600 TABLET, FILM COATED, EXTENDED RELEASE ORAL at 03:55

## 2024-12-16 RX ADMIN — HEPARIN SODIUM 5000 UNIT(S): 1000 INJECTION, SOLUTION INTRAVENOUS; SUBCUTANEOUS at 17:07

## 2024-12-16 RX ADMIN — POTASSIUM CHLORIDE 100 MILLIEQUIVALENT(S): 600 TABLET, FILM COATED, EXTENDED RELEASE ORAL at 17:07

## 2024-12-16 RX ADMIN — POTASSIUM CHLORIDE 100 MILLIEQUIVALENT(S): 600 TABLET, FILM COATED, EXTENDED RELEASE ORAL at 05:10

## 2024-12-16 RX ADMIN — Medication 300 MILLIGRAM(S): at 04:17

## 2024-12-16 NOTE — CONSULT NOTE ADULT - ASSESSMENT
INCOMPLETE NOTE ***************************************  69M with PMHx of CVA- partially thrombosed fusiform basilar artery aneurysm s/p stent and right sided paralysis, Right ocular implant not MRI compatible, nonverbal with behavioral disturbances, GERD presented with Left sided weakness. CT Head revealed possible enlarging subacute Right lateral cerebellar infarction compared to the imaging done 11/25. Patient was admitted to the stroke team for further management.       NEURO:   -Neurologically ---   -Continue close monitoring for neurologic deterioration    - Stroke neuro checks q _   - Permissive HTN or  SBP goal   -ANTITHROMBOTIC THERAPY:   -titrate statin to LDL goal less than 70  -MRI Brain w/o, MRA Head w/o and Neck w/contrast  -Dysphagia screen: pass/fail   -Physical therapy/OT/Speech eval/treatment.       CARDIOVASCULAR:   -check TTE, cardiac monitoring w/ telemetry for now, further evaluation pending findings of noted workup                              HEMATOLOGY:   -H/H _, Platelets __, patient should have all age and risk appropriate malignancy screenings with PCP or sooner if clinically suspected   -DVT ppx: Heparin s.c [] LMWH []     PULMONARY:   -CXR ___ , protecting airway, saturating well     RENAL:   -BUN/Cr _, monitor urine output, maintain adequate hydration    -Na Goal:  135-145  -Villagomez:    ID:   -Afebrile, no leukocytosis, monitor for si/sx of infection     OTHER:    -Condition and plan of care d/w patient, questions and concerns addressed.     DISPOSITION:   -Rehab or home depending on PT eval once stable and workup is complete      CORE MEASURES:        Admission NIHSS:      Tenecteplase : [] YES [] NO      LDL/HDL/A1C:     Depression Screen- if depression hx and/or present      Statin Therapy:     Dysphagia Screen: [] PASS [] FAIL     Smoking [] YES [] NO      Afib [] YES [] NO     Stroke Education [] YES [] NO    Obtain screening lower extremity venous ultrasound in patients who meet 1 or more of the following criteria as patient is high risk for DVT/PE on admission:   [] History of DVT/PE  []Hypercoagulable states (Factor V Leiden, Cancer, OCP, etc. )  []Prolonged immobility (hemiplegia/hemiparesis/post operative or any other extended immobilization)  [] Transferred from outside facility (Rehab or Long term care)  [] Age </= to 50 69M with PMHx of CVA- partially thrombosed fusiform basilar artery aneurysm s/p stent and right sided paralysis, Right ocular implant not MRI compatible, nonverbal with behavioral disturbances, GERD presented with Left sided weakness. CT Head revealed possible enlarging subacute Right lateral cerebellar infarction compared to the imaging done 11/25. Patient was admitted to the stroke team for further management.     IMPRESSION: Etiology of symptoms likely chronic - baseline. patient is unable to obtain MRI due to ocular implant - will repeat a CT Head. if unrevealing will consider a routine EEG to r/o NCES.       NEURO:   -Neurologically with residual right sided weakness   -Continue close monitoring for neurologic deterioration    - Stroke neuro checks q 2hr  -SBP goal < 180 mmhg, avoid rapid fluctuation and hypotension   -ANTITHROMBOTIC THERAPY: on ASA suppository   -titrate statin to LDL goal less than 70  -Unable to obtain MRI - ocular implant non MR compatible   -Rpt CTH in 48 hrs from initial insult   -Dysphagia screen: fail   -Physical therapy/OT/Speech eval/treatment.       CARDIOVASCULAR:   -TTE 11/24 - EF 54%, left atrium nml, cardiac monitoring w/ telemetry for now, further evaluation pending findings of noted workup                              HEMATOLOGY:   -H/H 10.6/31.9, Platelets 233 patient should have all age and risk appropriate malignancy screenings with PCP or sooner if clinically suspected   -DVT ppx: Heparin s.c [x] LMWH []     PULMONARY:   -on room air, protecting airway, saturating well     RENAL:   -BUN/Cr within limit, monitor urine output, maintain adequate hydration    -Na Goal:  135-145  -Villagomez:    ID:   -Afebrile, no leukocytosis, monitor for si/sx of infection     OTHER:    -Condition and plan of care d/w patient, questions and concerns addressed.   -Palliative consult, FULL CODE for now, will reach out to Ptient' Son Kisha  -Recs appreciated     DISPOSITION:   -Rehab or home depending on PT eval once stable and workup is complete      CORE MEASURES:        Admission NIHSS:      Tenecteplase : [] YES [] NO      LDL/HDL/A1C:     Depression Screen- if depression hx and/or present      Statin Therapy:     Dysphagia Screen: [] PASS [] FAIL     Smoking [] YES [] NO      Afib [] YES [] NO     Stroke Education [] YES [] NO    Obtain screening lower extremity venous ultrasound in patients who meet 1 or more of the following criteria as patient is high risk for DVT/PE on admission:   [] History of DVT/PE  []Hypercoagulable states (Factor V Leiden, Cancer, OCP, etc. )  []Prolonged immobility (hemiplegia/hemiparesis/post operative or any other extended immobilization)  [] Transferred from outside facility (Rehab or Long term care)  [] Age </= to 50

## 2024-12-16 NOTE — SWALLOW BEDSIDE ASSESSMENT ADULT - ADDITIONAL RECOMMENDATIONS
Consider short term non-oral means of nutrition/hydration negative - no pain, no limited range of motion

## 2024-12-16 NOTE — SWALLOW BEDSIDE ASSESSMENT ADULT - COMMENTS
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 and now nonverbal and w/ behavioral disturbances treated at  [facility] (6/14-7/17 [address]241) ,  a h/o Rt ocular implant (non MR compatible), GERD,  recently admitted and discharged from [facility] after being admitted for a mechanical fall (resulting in nasal fractures) / failure to thrive, mildly elevated troponin levels (deemed non cardiac)  and AMS suspected secondary to progression of vascular dementia now presenting from [facility] after son reportedly found him to have left sided weakness which is reportedly new. In the ER he was noted to be moving his left side but not his right and as such stroke code was activated. CT imaging notable for possible enlarging Rt lateral cerebellar subacute infarction (when compared to 11/25)

## 2024-12-16 NOTE — SWALLOW BEDSIDE ASSESSMENT ADULT - ORAL PREPARATORY PHASE
reduced orientation to utensil, reduced stripping of spoon, reduced bolus formation, with bolus remaining in anterior oral cavity until removed by clinician/Reduced oral grading

## 2024-12-16 NOTE — PATIENT PROFILE ADULT - HAVE YOU EXPERIENCED VIOLENCE OR A TRAUMATIC EVENT?
Brother, Bahman Pittman Called.    He has questions about today's visit, patient was seen on 8/24/2021.    Please call: [phone].   no

## 2024-12-16 NOTE — CONSULT NOTE ADULT - ASSESSMENT
69 year old male hx of stroke, aneurysm repair recently admitted and discharged with Fall, AMS(deemed from progression of vascular dementia), dysphagia, Elevated troponins now back to [facility] with Left sided weakness which son reports is new. CT done in ED showing possible right lateral cerebellum larger in size as compared to scan on 11/25/24. Admitted for AMS.  Patient seen by palliative care team last admission on 11/29,12/3, and 12/5 at which time GOC no limitation to intervention, FULL CODE, plan was for [facility] when medically optimized       Palliative consulted for advanced dementia     Problem/Recommendation 1:AMS/Dementia  Underlying vascular dementia - pt had recent stroke/aneurysm repair at [facility] (June/July 2024) and been declining prior to hospitalization   Noted CT head in ED with  possible right lateral cerebellum larger in size as compared to scan on 11/25/24  Consider trying to avoid/minimize deliriogenic drugs such as benzodiazepines and anticholinergics   Non pharmacologic options for delirium: Frequently orient patient, identify self, maintain consistent staffing and location   Limit stimulation, soft voice, soft lighting, gentle handling  Sleep disturbance support  Provide adequate sensory aides such as hearing aids, glasses, calendar, clock  Soft warm blankets  Bed alarm for safety    Problem/Recommendation 2:Dysphagia  Currently NPO  maintain HOB >/= 90 degrees while feeding (if diet advanced)  small bites ifdiet advanced  aspiration precautions    Problem/Recommendation 3: Debility  Assist in ADLS  Maintain safety, fall, aspiration precautions  Set bed alarm, chair alarm for safety and fall prevention  Turn and Position in bed     Problem/Recommendation 4: Palliative Care Encounter  Met with patient at bedside, patient opens eyes when verbally asked to do so, he also nods or shakes his head to yes or no questions  Non verbal cues of pain absent, when asked patient moved head left to right to signify "no" for pain  Free of shortness of breath on exam , helped reposition in bed for comfort  Patient well known to palliative services as we just saw him last admission on 11/29,12/3, and 12/5 at which time GOC no limitation to intervention, FULL CODE, plan was for [facility] when medically optimized   Will reach out to patient's son Kisha for further GOC as course progressses  Palliative to follow    60 minutes  Total time also includes discussion during interdisciplinary team rounds, chart review including but limited to prior admissions/   review of medications/ labs/ imaging, examination, care coordination with other health care professionals, documentation EXCLUDING advance care planning discussions.     Thank you for the opportunity to assist with the care of this patient.   [facility] Palliative Medicine Consult Service [phone].

## 2024-12-16 NOTE — CONSULT NOTE ADULT - SUBJECTIVE AND OBJECTIVE BOX
Palliative Care Consult  69 year old male hx of stroke, aneurysm repair recently admitted and discharged with Fall, AMS(deemed from progression of vascular dementia), dysphagia, Elevated troponins now back to [facility] with Left sided weakness which son reports is new. CT done in ED showing possible right lateral cerebellum larger in size as compared to scan on 11/25/24. Admitted for AMS.  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 In the ER he was noted to be moving his left side but not his right and as such stroke code was activated. CT imaging notable for possible enlarging Rt lateral cerebellar subacute infarction (when compared to 11/25)   On my exam pt is laying on his Rt side, waving his left hand and pushing me away. Refusing to be examined in detail. Does occasionally nod when asked if he knows where he is but is not communicating verbally.  (15 Dec 2024 23:21)>end of copied text      PERTINENT PMH REVIEWED: Yes     PAST MEDICAL & SURGICAL HISTORY:  CVA (cerebrovascular accident)      HTN (hypertension)      H/O aneurysm      S/P cerebral aneurysm repair          SOCIAL HISTORY:                      Substance history:none                    Admitted from:  [facility]                    Jain/spirituality:unknown                    Cultural concerns: none                                            Surrogate/HCP/Guardian: Phone#:Kisha Solo son [phone]      FAMILY HISTORY:  No family history related to admission diagonsis    Allergies  No Known Allergies    ADVANCE DIRECTIVES/TREATMENT PREFERENCES:  Full Code    Baseline ADLs (prior to admission):  Dependent      Karnofsky/Palliative Performance Status Version 2:  %30  [email or web address]    Present Symptoms:   Present Symptoms: patient noded no to dyspnea, pain, n/v/d. Patient nodded yes to hunger and gestured towards his bedside table.   nods yes and no to questions, mouthes some responses, reaches for things he needs       Pain: no            Character-            Duration-            Effect-            Factors-            Frequency-            Location-            Severity-    Pain AD Score:0  [email or web address] (press ctrl + left click to view)    Review of Systems: Reviewed  Unable to obtain due to poor mentation   All others negative    MEDICATIONS  (STANDING):  aspirin enteric coated 81 milliGRAM(s) Oral daily  atorvastatin 40 milliGRAM(s) Oral at bedtime  baclofen 5 milliGRAM(s) Oral every 8 hours  dronabinol 2.5 milliGRAM(s) Oral two times a day  famotidine    Tablet 20 milliGRAM(s) Oral two times a day  heparin   Injectable 5000 Unit(s) SubCutaneous every 12 hours  mirtazapine 7.5 milliGRAM(s) Oral daily  pantoprazole    Tablet 40 milliGRAM(s) Oral before breakfast  QUEtiapine 25 milliGRAM(s) Oral at bedtime  sertraline 50 milliGRAM(s) Oral daily  ticagrelor 90 milliGRAM(s) Oral every 12 hours    MEDICATIONS  (PRN):  acetaminophen     Tablet .. 650 milliGRAM(s) Oral every 6 hours PRN Temp greater or equal to 38C (100.4F), Mild Pain (1 - 3)  aluminum hydroxide/magnesium hydroxide/simethicone Suspension 30 milliLiter(s) Oral every 4 hours PRN Dyspepsia  aspirin Suppository 300 milliGRAM(s) Rectal daily PRN If not able to take PO. DO not give if takes oral ASA  melatonin 3 milliGRAM(s) Oral at bedtime PRN Insomnia  ondansetron Injectable 4 milliGRAM(s) IV Push every 8 hours PRN Nausea and/or Vomiting      PHYSICAL EXAM:    Vital Signs Last 24 Hrs  T(C): 36.4 (16 Dec 2024 07:57), Max: 36.6 (15 Dec 2024 23:43)  T(F): 97.6 (16 Dec 2024 07:57), Max: 97.8 (15 Dec 2024 23:43)  HR: 59 (16 Dec 2024 07:57) (51 - 68)  BP: 158/83 (16 Dec 2024 07:57) (136/71 - 193/83)  BP(mean): --  RR: 17 (16 Dec 2024 07:57) (16 - 19)  SpO2: 100% (16 Dec 2024 07:57) (94% - 100%)    Parameters below as of 16 Dec 2024 07:57  Patient On (Oxygen Delivery Method): room air        General: alert  nods head in response, moves arms and gestures, aphasia    Karnofsky:  %30    HEENT: dry mouth      Lungs: comfortable     CV: normal      GI:  dysphagia    : incontinent     MSK: weakness     Skin: normal       LABS:                        10.6   4.99  )-----------( 233      ( 15 Dec 2024 17:21 )             31.9     12-15    137  |  103  |  17.0  ----------------------------<  89  3.3[L]   |  25.0  |  0.45[L]    Ca    7.5[L]      15 Dec 2024 17:21    TPro  4.8[L]  /  Alb  2.7[L]  /  TBili  0.4  /  DBili  x   /  AST  29  /  ALT  39  /  AlkPhos  73  12-15    PT/INR - ( 15 Dec 2024 17:21 )   PT: 12.2 sec;   INR: 1.05 ratio         PTT - ( 15 Dec 2024 17:21 )  PTT:28.9 sec  Urinalysis Basic - ( 15 Dec 2024 17:21 )    Color: x / Appearance: x / SG: x / pH: x  Gluc: 89 mg/dL / Ketone: x  / Bili: x / Urobili: x   Blood: x / Protein: x / Nitrite: x   Leuk Esterase: x / RBC: x / WBC x   Sq Epi: x / Non Sq Epi: x / Bacteria: x      I&O's Summary      RADIOLOGY & ADDITIONAL STUDIES:      IMPRESSION:  CT BRAIN 12-15-24    IMPRESSION:  No acute hemorrhage.  Stent within an ovoid dense structure with mass effect on the amy   consistent with stented basilar artery aneurysm, not significantly   changed.  Loss of gray-white junction in the right lateral cerebellum which may be   larger in size compared to 11/25/2024. Cannot exclude enlarging subacute   infarct.  Encephalomalacia and gliosis in the right medial occipital lobe, not   significantly changed.    12.15.24   CT PERFUSION:  Technical limitations: None.    Core infarction: 0 ml  Penumbra / tissue at risk for active ischemia: 0 ml    CTA NECK:  Poor visualization of the proximal left vertebral artery. There is   extensive injection lateral vessel contamination.  Calcified plaque bilateral proximal ICA with less than 50% stenosis based   on NASCET criteria.    CTA HEAD:  No large vessel occlusion, significant stenosis or vascular abnormality   identified.  Stent in the basilar artery with excluded basilar aneurysm, not   significantly changed.

## 2024-12-16 NOTE — CONSULT NOTE ADULT - SUBJECTIVE AND OBJECTIVE BOX
INCOMPLETE NOTE **************************************  Preliminary note, official note pending attending review/signature.  Seen and examined by Stroke team attending/team, assessment/ plan as discussed with stroke team attending/team as noted.                                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 CT Head revealed possible enlarging subacute Right lateral cerebellar infarction compared to the imaging done 11/25. Patient was admitted to the stroke team for further management.     Of note, patient was recently seen and discharged from [facility] for mechanical fall resulting in nasal fractures, failure to thrive and possible vascular dementia.        PAST MEDICAL & SURGICAL HISTORY:  CVA (cerebrovascular accident)  HTN (hypertension)  H/O aneurysm  S/P cerebral aneurysm repair          MEDICATIONS  (STANDING):  aspirin enteric coated 81 milliGRAM(s) Oral daily  atorvastatin 40 milliGRAM(s) Oral at bedtime  baclofen 5 milliGRAM(s) Oral every 8 hours  dronabinol 2.5 milliGRAM(s) Oral two times a day  famotidine    Tablet 20 milliGRAM(s) Oral two times a day  heparin   Injectable 5000 Unit(s) SubCutaneous every 12 hours  mirtazapine 7.5 milliGRAM(s) Oral daily  pantoprazole    Tablet 40 milliGRAM(s) Oral before breakfast  QUEtiapine 25 milliGRAM(s) Oral at bedtime  sertraline 50 milliGRAM(s) Oral daily  ticagrelor 90 milliGRAM(s) Oral every 12 hours    MEDICATIONS  (PRN):  acetaminophen     Tablet .. 650 milliGRAM(s) Oral every 6 hours PRN Temp greater or equal to 38C (100.4F), Mild Pain (1 - 3)  aluminum hydroxide/magnesium hydroxide/simethicone Suspension 30 milliLiter(s) Oral every 4 hours PRN Dyspepsia  aspirin Suppository 300 milliGRAM(s) Rectal daily PRN If not able to take PO. DO not give if takes oral ASA  melatonin 3 milliGRAM(s) Oral at bedtime PRN Insomnia  ondansetron Injectable 4 milliGRAM(s) IV Push every 8 hours PRN Nausea and/or Vomiting      Allergies    No Known Allergies    Intolerances        SOCIAL HISTORY:  no tob,   no alcohol   no drugs    FAMILY HISTORY:        ROS: 14 point ROS negative other than what is present in HPI or below    Vital Signs Last 24 Hrs  T(C): 36.4 (16 Dec 2024 07:57), Max: 36.6 (15 Dec 2024 23:43)  T(F): 97.6 (16 Dec 2024 07:57), Max: 97.8 (15 Dec 2024 23:43)  HR: 59 (16 Dec 2024 07:57) (51 - 68)  BP: 158/83 (16 Dec 2024 07:57) (136/71 - 193/83)  BP(mean): --  RR: 17 (16 Dec 2024 07:57) (16 - 19)  SpO2: 100% (16 Dec 2024 07:57) (94% - 100%)    Parameters below as of 16 Dec 2024 07:57  Patient On (Oxygen Delivery Method): room air      EXAM PENDING *************************************************    Physical Exam:  General: No acute distress.   HEENT: Head normocephalic, atraumatic. Conjunctivae clear w/o exudates or hemorrhage. Sclera non-icteric. Nares are patent bilaterally. Mucous membranes pink and moist.  No tonsillar swelling or exudates.    Neck: Supple, no adenopathy. Trachea midline. No JVD.  Cardiac: Normal rate and rhythm. S1, S2 auscultated. No murmurs, gallops, or rubs.     Respiratory: Lung sounds clear in all fields. Chest wall symmetric, nontender.   Abdominal: Soft, nondistended, nontender. Bowel sounds normoactive x 4 quadrants. No masses, hepatomegaly, or splenomegaly.   Skin: Skin is warm, dry and intact without rashes or lesions. Appropriate color for ethnicity. Nailbeds pink with no cyanosis or clubbing.   Extremities: No edema, 2+ peripheral pulses in b/l upper and b/l lower extremities. Full range of motion in all joints.     Detailed Neurologic Exam:    Mental status: The patient is awake and alert and has normal attention span.  The patient is fully oriented in 3 spheres. The patient is oriented to current events. The patient is able to name objects, follow commands, repeat sentences.    Cranial nerves: Pupils equal and react symmetrically to light. There is no visual field deficit to confrontation. Extraocular motion is full with no nystagmus. There is no ptosis. Facial sensation is intact. Facial musculature is symmetric. Palate elevates symmetrically. Tongue is midline.    Motor: There is normal bulk and tone.  There is no tremor.  Strength is 5/5 in the right arm and leg.   Strength is 5/5 in the left arm and leg.    Sensation: Intact to light touch and pin in 4 extremities    Reflexes: 1-2+ throughout and plantar responses are flexor.    Cerebellar: There is no dysmetria on finger to nose testing.    Gait : deferred      NIH SS:  DATE:  TIME:  1A: Level of consciousness (0-3):   1B: Questions (0-2):   1C: Commands (0-2):   2: Gaze (0-2):   3: Visual fields (0-3):   4: Facial palsy (0-3):   MOTOR:  5A: Left arm motor drift (0-4):   5B: Right arm motor drift (0-4):   6A: Left leg motor drift (0-4):   6B: Right leg motor drift (0-4):   7: Limb ataxia (0-2):   SENSORY:  8: Sensation (0-2):   SPEECH:  9: Language (0-3):   10: Dysarthria (0-2):   EXTINCTION:  11: Extinction/inattention (0-2):     TOTAL SCORE:     prehospital mRS=        LABS:                         10.6   4.99  )-----------( 233      ( 15 Dec 2024 17:21 )             31.9       12-15    137  |  103  |  17.0  ----------------------------<  89  3.3[L]   |  25.0  |  0.45[L]    Ca    7.5[L]      15 Dec 2024 17:21    TPro  4.8[L]  /  Alb  2.7[L]  /  TBili  0.4  /  DBili  x   /  AST  29  /  ALT  39  /  AlkPhos  73  12-15      PT/INR - ( 15 Dec 2024 17:21 )   PT: 12.2 sec;   INR: 1.05 ratio         PTT - ( 15 Dec 2024 17:21 )  PTT:28.9 sec    11-25 Chol 88 LDL -- HDL 34[L] Trig 93    A1C:         RADIOLOGY & ADDITIONAL STUDIES (independently reviewed unless otherwise noted):    CT Brain Stroke Protocol (12.15.24 @ 17:33) >  IMPRESSION:  No acute hemorrhage.  Stent within an ovoid dense structure with mass effect on the amy   consistent with stented basilar artery aneurysm, not significantly   changed.  Loss of gray-white junction in the right lateral cerebellum which may be   larger in size compared to 11/25/2024. Cannot exclude enlarging subacute   infarct.  Encephalomalacia and gliosis in the right medial occipital lobe, not   significantly changed.    < end of copied text >  < from: CT Brain Perfusion Maps Stroke (12.15.24 @ 17:44) >  IMPRESSION:    CT PERFUSION:  Technical limitations: None.    Core infarction: 0 ml  Penumbra / tissue at risk for active ischemia: 0 ml    CTA NECK:  Poor visualization of the proximal left vertebral artery. There is   extensive injection lateral vessel contamination.  Calcified plaque bilateral proximal ICA with less than 50% stenosis based   on NASCET criteria.    CTA HEAD:  No large vessel occlusion, significant stenosis or vascular abnormality   identified.  Stent in the basilar artery with excluded basilar aneurysm, not   significantly changed.      < end of copied text >  < from: Xray Chest 1 View- PORTABLE-Urgent (12.15.24 @ 18:40) >  FINDINGS:    Single frontal view of the chest demonstrates the lungs to be clear. The   cardiomediastinal silhouette is unremarkable. No acute osseous   abnormalities. Overlying EKG leads and wires are noted    IMPRESSION: No acute cardiopulmonary disease process.    < end of copied text >   INCOMPLETE NOTE **************************************  Preliminary note, official note pending attending review/signature.  Seen and examined by Stroke team attending/team, assessment/ plan as discussed with stroke team attending/team as noted.                                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 CT Head revealed possible enlarging subacute Right lateral cerebellar infarction compared to the imaging done 11/25. Patient was admitted to the stroke team for further management.     Of note, patient was recently seen and discharged from [facility] for mechanical fall resulting in nasal fractures, failure to thrive and possible vascular dementia.        PAST MEDICAL & SURGICAL HISTORY:  CVA (cerebrovascular accident)  HTN (hypertension)  H/O aneurysm  S/P cerebral aneurysm repair          MEDICATIONS  (STANDING):  aspirin enteric coated 81 milliGRAM(s) Oral daily  atorvastatin 40 milliGRAM(s) Oral at bedtime  baclofen 5 milliGRAM(s) Oral every 8 hours  dronabinol 2.5 milliGRAM(s) Oral two times a day  famotidine    Tablet 20 milliGRAM(s) Oral two times a day  heparin   Injectable 5000 Unit(s) SubCutaneous every 12 hours  mirtazapine 7.5 milliGRAM(s) Oral daily  pantoprazole    Tablet 40 milliGRAM(s) Oral before breakfast  QUEtiapine 25 milliGRAM(s) Oral at bedtime  sertraline 50 milliGRAM(s) Oral daily  ticagrelor 90 milliGRAM(s) Oral every 12 hours    MEDICATIONS  (PRN):  acetaminophen     Tablet .. 650 milliGRAM(s) Oral every 6 hours PRN Temp greater or equal to 38C (100.4F), Mild Pain (1 - 3)  aluminum hydroxide/magnesium hydroxide/simethicone Suspension 30 milliLiter(s) Oral every 4 hours PRN Dyspepsia  aspirin Suppository 300 milliGRAM(s) Rectal daily PRN If not able to take PO. DO not give if takes oral ASA  melatonin 3 milliGRAM(s) Oral at bedtime PRN Insomnia  ondansetron Injectable 4 milliGRAM(s) IV Push every 8 hours PRN Nausea and/or Vomiting      Allergies    No Known Allergies    Intolerances        SOCIAL HISTORY:  no tob,   no alcohol   no drugs    FAMILY HISTORY:        ROS: 14 point ROS negative other than what is present in HPI or below    Vital Signs Last 24 Hrs  T(C): 36.4 (16 Dec 2024 07:57), Max: 36.6 (15 Dec 2024 23:43)  T(F): 97.6 (16 Dec 2024 07:57), Max: 97.8 (15 Dec 2024 23:43)  HR: 59 (16 Dec 2024 07:57) (51 - 68)  BP: 158/83 (16 Dec 2024 07:57) (136/71 - 193/83)  BP(mean): --  RR: 17 (16 Dec 2024 07:57) (16 - 19)  SpO2: 100% (16 Dec 2024 07:57) (94% - 100%)    Parameters below as of 16 Dec 2024 07:57  Patient On (Oxygen Delivery Method): room air      EXAM PENDING *************************************************    Physical Exam:  General: No acute distress.   HEENT: Head normocephalic, atraumatic. Conjunctivae clear w/o exudates or hemorrhage. Sclera non-icteric. Nares are patent bilaterally. Mucous membranes pink and moist.  No tonsillar swelling or exudates.    Neck: Supple, no adenopathy. Trachea midline. No JVD.  Cardiac: Normal rate and rhythm. S1, S2 auscultated. No murmurs, gallops, or rubs.     Respiratory: Lung sounds clear in all fields. Chest wall symmetric, nontender.   Abdominal: Soft, nondistended, nontender. Bowel sounds normoactive x 4 quadrants. No masses, hepatomegaly, or splenomegaly.   Skin: Skin is warm, dry and intact without rashes or lesions. Appropriate color for ethnicity. Nailbeds pink with no cyanosis or clubbing.   Extremities: No edema, 2+ peripheral pulses in b/l upper and b/l lower extremities. Full range of motion in all joints.     Detailed Neurologic Exam:    Mental status: The patient is awake and alert and has normal attention span.  The patient is fully oriented in 3 spheres. The patient is oriented to current events. The patient is able to name objects, follow commands, repeat sentences.    Cranial nerves: Pupils equal and react symmetrically to light. There is no visual field deficit to confrontation. Extraocular motion is full with no nystagmus. There is no ptosis. Facial sensation is intact. Facial musculature is symmetric. Palate elevates symmetrically. Tongue is midline.    Motor: There is normal bulk and tone.  There is no tremor.  Strength is 5/5 in the right arm and leg.   Strength is 5/5 in the left arm and leg.    Sensation: Intact to light touch and pin in 4 extremities    Reflexes: 1-2+ throughout and plantar responses are flexor.    Cerebellar: There is no dysmetria on finger to nose testing.    Gait : deferred      NIH SS:  DATE:  TIME:  1A: Level of consciousness (0-3):   1B: Questions (0-2):   1C: Commands (0-2):   2: Gaze (0-2):   3: Visual fields (0-3):   4: Facial palsy (0-3):   MOTOR:  5A: Left arm motor drift (0-4):   5B: Right arm motor drift (0-4):   6A: Left leg motor drift (0-4):   6B: Right leg motor drift (0-4):   7: Limb ataxia (0-2):   SENSORY:  8: Sensation (0-2):   SPEECH:  9: Language (0-3):   10: Dysarthria (0-2):   EXTINCTION:  11: Extinction/inattention (0-2):     TOTAL SCORE:     prehospital mRS=        LABS:                         10.6   4.99  )-----------( 233      ( 15 Dec 2024 17:21 )             31.9       12-15    137  |  103  |  17.0  ----------------------------<  89  3.3[L]   |  25.0  |  0.45[L]    Ca    7.5[L]      15 Dec 2024 17:21    TPro  4.8[L]  /  Alb  2.7[L]  /  TBili  0.4  /  DBili  x   /  AST  29  /  ALT  39  /  AlkPhos  73  12-15      PT/INR - ( 15 Dec 2024 17:21 )   PT: 12.2 sec;   INR: 1.05 ratio         PTT - ( 15 Dec 2024 17:21 )  PTT:28.9 sec    11-25 Chol 88 LDL -- HDL 34[L] Trig 93    A1C:         RADIOLOGY & ADDITIONAL STUDIES (independently reviewed unless otherwise noted):      CT Brain Stroke Protocol (12.15.24 @ 17:33) >  IMPRESSION:  No acute hemorrhage.  Stent within an ovoid dense structure with mass effect on the amy   consistent with stented basilar artery aneurysm, not significantly   changed.  Loss of gray-white junction in the right lateral cerebellum which may be   larger in size compared to 11/25/2024. Cannot exclude enlarging subacute   infarct.  Encephalomalacia and gliosis in the right medial occipital lobe, not   significantly changed.      CT Brain Perfusion Maps Stroke (12.15.24 @ 17:44)   IMPRESSION:    CT PERFUSION:  Technical limitations: None.    Core infarction: 0 ml  Penumbra / tissue at risk for active ischemia: 0 ml    CTA NECK:  Poor visualization of the proximal left vertebral artery. There is   extensive injection lateral vessel contamination.  Calcified plaque bilateral proximal ICA with less than 50% stenosis based   on NASCET criteria.    CTA HEAD:  No large vessel occlusion, significant stenosis or vascular abnormality   identified.  Stent in the basilar artery with excluded basilar aneurysm, not   significantly changed.      Xray Chest 1 View- PORTABLE-Urgent (12.15.24 @ 18:40)   FINDINGS:    Single frontal view of the chest demonstrates the lungs to be clear. The   cardiomediastinal silhouette is unremarkable. No acute osseous   abnormalities. Overlying EKG leads and wires are noted    IMPRESSION: No acute cardiopulmonary disease process.       INCOMPLETE NOTE **************************************  Preliminary note, official note pending attending review/signature.  Seen and examined by Stroke team attending/team, assessment/ plan as discussed with stroke team attending/team as noted.                                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 CT Head revealed possible enlarging subacute Right lateral cerebellar infarction compared to the imaging done 11/25. Patient was admitted to the stroke team for further management.     Of note, patient was recently seen and discharged from [facility] for mechanical fall resulting in nasal fractures, failure to thrive and possible vascular dementia.        PAST MEDICAL & SURGICAL HISTORY:  CVA (cerebrovascular accident)  HTN (hypertension)  H/O aneurysm  S/P cerebral aneurysm repair          MEDICATIONS  (STANDING):  aspirin enteric coated 81 milliGRAM(s) Oral daily  atorvastatin 40 milliGRAM(s) Oral at bedtime  baclofen 5 milliGRAM(s) Oral every 8 hours  dronabinol 2.5 milliGRAM(s) Oral two times a day  famotidine    Tablet 20 milliGRAM(s) Oral two times a day  heparin   Injectable 5000 Unit(s) SubCutaneous every 12 hours  mirtazapine 7.5 milliGRAM(s) Oral daily  pantoprazole    Tablet 40 milliGRAM(s) Oral before breakfast  QUEtiapine 25 milliGRAM(s) Oral at bedtime  sertraline 50 milliGRAM(s) Oral daily  ticagrelor 90 milliGRAM(s) Oral every 12 hours    MEDICATIONS  (PRN):  acetaminophen     Tablet .. 650 milliGRAM(s) Oral every 6 hours PRN Temp greater or equal to 38C (100.4F), Mild Pain (1 - 3)  aluminum hydroxide/magnesium hydroxide/simethicone Suspension 30 milliLiter(s) Oral every 4 hours PRN Dyspepsia  aspirin Suppository 300 milliGRAM(s) Rectal daily PRN If not able to take PO. DO not give if takes oral ASA  melatonin 3 milliGRAM(s) Oral at bedtime PRN Insomnia  ondansetron Injectable 4 milliGRAM(s) IV Push every 8 hours PRN Nausea and/or Vomiting      Allergies    No Known Allergies    Intolerances        SOCIAL HISTORY:  no tob,   no alcohol   no drugs    FAMILY HISTORY:        ROS: 14 point ROS negative other than what is present in HPI or below    Vital Signs Last 24 Hrs  T(C): 36.4 (16 Dec 2024 07:57), Max: 36.6 (15 Dec 2024 23:43)  T(F): 97.6 (16 Dec 2024 07:57), Max: 97.8 (15 Dec 2024 23:43)  HR: 59 (16 Dec 2024 07:57) (51 - 68)  BP: 158/83 (16 Dec 2024 07:57) (136/71 - 193/83)  BP(mean): --  RR: 17 (16 Dec 2024 07:57) (16 - 19)  SpO2: 100% (16 Dec 2024 07:57) (94% - 100%)    Parameters below as of 16 Dec 2024 07:57  Patient On (Oxygen Delivery Method): room air      EXAM PENDING *************************************************    Physical Exam:  General: No acute distress.   HEENT: Head normocephalic, atraumatic. Conjunctivae clear w/o exudates or hemorrhage. Sclera non-icteric. Nares are patent bilaterally. Mucous membranes pink and moist.  No tonsillar swelling or exudates.    Neck: Supple, no adenopathy. Trachea midline. No JVD.  Cardiac: Normal rate and rhythm. S1, S2 auscultated. No murmurs, gallops, or rubs.     Respiratory: Lung sounds clear in all fields. Chest wall symmetric, nontender.   Abdominal: Soft, nondistended, nontender. Bowel sounds normoactive x 4 quadrants. No masses, hepatomegaly, or splenomegaly.   Skin: Skin is warm, dry and intact without rashes or lesions. Appropriate color for ethnicity. Nailbeds pink with no cyanosis or clubbing.   Extremities: No edema, 2+ peripheral pulses in b/l upper and b/l lower extremities. Full range of motion in all joints.     Detailed Neurologic Exam:    Mental status: The patient is awake and alert and has normal attention span.  The patient is fully oriented in 3 spheres. The patient is oriented to current events. The patient is able to name objects, follow commands, repeat sentences.    Cranial nerves: Pupils equal and react symmetrically to light. There is no visual field deficit to confrontation. Extraocular motion is full with no nystagmus. There is no ptosis. Facial sensation is intact. Facial musculature is symmetric. Palate elevates symmetrically. Tongue is midline.    Motor: There is normal bulk and tone.  There is no tremor.  Strength is 5/5 in the right arm and leg.   Strength is 5/5 in the left arm and leg.    Sensation: Intact to light touch and pin in 4 extremities    Reflexes: 1-2+ throughout and plantar responses are flexor.    Cerebellar: There is no dysmetria on finger to nose testing.    Gait : deferred      NIH SS:  DATE:  TIME: 1308  1A: Level of consciousness (0-3):   1B: Questions (0-2):   1C: Commands (0-2):   2: Gaze (0-2):   3: Visual fields (0-3):   4: Facial palsy (0-3):   MOTOR:  5A: Left arm motor drift (0-4):   5B: Right arm motor drift (0-4):   6A: Left leg motor drift (0-4):   6B: Right leg motor drift (0-4):   7: Limb ataxia (0-2):   SENSORY:  8: Sensation (0-2):   SPEECH:  9: Language (0-3):   10: Dysarthria (0-2):   EXTINCTION:  11: Extinction/inattention (0-2):     TOTAL SCORE:     prehospital mRS=        LABS:                         10.6   4.99  )-----------( 233      ( 15 Dec 2024 17:21 )             31.9       12-15    137  |  103  |  17.0  ----------------------------<  89  3.3[L]   |  25.0  |  0.45[L]    Ca    7.5[L]      15 Dec 2024 17:21    TPro  4.8[L]  /  Alb  2.7[L]  /  TBili  0.4  /  DBili  x   /  AST  29  /  ALT  39  /  AlkPhos  73  12-15      PT/INR - ( 15 Dec 2024 17:21 )   PT: 12.2 sec;   INR: 1.05 ratio         PTT - ( 15 Dec 2024 17:21 )  PTT:28.9 sec    11-25 Chol 88 LDL -- HDL 34[L] Trig 93    A1C:         RADIOLOGY & ADDITIONAL STUDIES (independently reviewed unless otherwise noted):      CT Brain Stroke Protocol (12.15.24 @ 17:33) >  IMPRESSION:  No acute hemorrhage.  Stent within an ovoid dense structure with mass effect on the amy   consistent with stented basilar artery aneurysm, not significantly   changed.  Loss of gray-white junction in the right lateral cerebellum which may be   larger in size compared to 11/25/2024. Cannot exclude enlarging subacute   infarct.  Encephalomalacia and gliosis in the right medial occipital lobe, not   significantly changed.      CT Brain Perfusion Maps Stroke (12.15.24 @ 17:44)   IMPRESSION:    CT PERFUSION:  Technical limitations: None.    Core infarction: 0 ml  Penumbra / tissue at risk for active ischemia: 0 ml    CTA NECK:  Poor visualization of the proximal left vertebral artery. There is   extensive injection lateral vessel contamination.  Calcified plaque bilateral proximal ICA with less than 50% stenosis based   on NASCET criteria.    CTA HEAD:  No large vessel occlusion, significant stenosis or vascular abnormality   identified.  Stent in the basilar artery with excluded basilar aneurysm, not   significantly changed.      Xray Chest 1 View- PORTABLE-Urgent (12.15.24 @ 18:40)   FINDINGS:    Single frontal view of the chest demonstrates the lungs to be clear. The   cardiomediastinal silhouette is unremarkable. No acute osseous   abnormalities. Overlying EKG leads and wires are noted    IMPRESSION: No acute cardiopulmonary disease process.       Preliminary note, official note pending attending review/signature.  Seen and examined by Stroke team attending/team, assessment/ plan as discussed with stroke team attending/team as noted.                                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 CT Head revealed possible enlarging subacute Right lateral cerebellar infarction compared to the imaging done 11/25. Patient was admitted to the stroke team for further management.     Of note, patient was recently seen and discharged from [facility] for mechanical fall resulting in nasal fractures, failure to thrive and possible vascular dementia.        PAST MEDICAL & SURGICAL HISTORY:  CVA (cerebrovascular accident)  HTN (hypertension)  H/O aneurysm  S/P cerebral aneurysm repair          MEDICATIONS  (STANDING):  aspirin enteric coated 81 milliGRAM(s) Oral daily  atorvastatin 40 milliGRAM(s) Oral at bedtime  baclofen 5 milliGRAM(s) Oral every 8 hours  dronabinol 2.5 milliGRAM(s) Oral two times a day  famotidine    Tablet 20 milliGRAM(s) Oral two times a day  heparin   Injectable 5000 Unit(s) SubCutaneous every 12 hours  mirtazapine 7.5 milliGRAM(s) Oral daily  pantoprazole    Tablet 40 milliGRAM(s) Oral before breakfast  QUEtiapine 25 milliGRAM(s) Oral at bedtime  sertraline 50 milliGRAM(s) Oral daily  ticagrelor 90 milliGRAM(s) Oral every 12 hours    MEDICATIONS  (PRN):  acetaminophen     Tablet .. 650 milliGRAM(s) Oral every 6 hours PRN Temp greater or equal to 38C (100.4F), Mild Pain (1 - 3)  aluminum hydroxide/magnesium hydroxide/simethicone Suspension 30 milliLiter(s) Oral every 4 hours PRN Dyspepsia  aspirin Suppository 300 milliGRAM(s) Rectal daily PRN If not able to take PO. DO not give if takes oral ASA  melatonin 3 milliGRAM(s) Oral at bedtime PRN Insomnia  ondansetron Injectable 4 milliGRAM(s) IV Push every 8 hours PRN Nausea and/or Vomiting      Allergies    No Known Allergies    Intolerances        SOCIAL HISTORY:  no tob,   no alcohol   no drugs    FAMILY HISTORY:        ROS: 14 point ROS negative other than what is present in HPI or below    Vital Signs Last 24 Hrs  T(C): 36.4 (16 Dec 2024 07:57), Max: 36.6 (15 Dec 2024 23:43)  T(F): 97.6 (16 Dec 2024 07:57), Max: 97.8 (15 Dec 2024 23:43)  HR: 59 (16 Dec 2024 07:57) (51 - 68)  BP: 158/83 (16 Dec 2024 07:57) (136/71 - 193/83)  BP(mean): --  RR: 17 (16 Dec 2024 07:57) (16 - 19)  SpO2: 100% (16 Dec 2024 07:57) (94% - 100%)    Parameters below as of 16 Dec 2024 07:57  Patient On (Oxygen Delivery Method): room air      Physical Exam:  General: No acute distress.   HEENT: Head normocephalic, atraumatic. Conjunctivae clear w/o exudates or hemorrhage.  Neck: Supple, no adenopathy. Trachea midline. No JVD.    Respiratory: Lung sounds clear in all fields. Chest wall symmetric, nontender.   Abdominal: Soft, nondistended, nontender.   Skin: Skin is warm, dry and intact without rashes or lesions.   Extremities: No edema, Full range of motion in all joints.     Detailed Neurologic Exam:    Mental status: The patient is sleepy but rouses to minor verbal stimuli. patient is non verbal nods yes and no, able to follow some commands.    Cranial nerves: Pupils equal and react symmetrically to light. There is no visual field deficit to confrontation. Extraocular motion is full with no nystagmus. There is no ptosis. Facial sensation is intact. Right facial asymmetry  - mild loss of nasolabial fold.  Palate elevates symmetrically. Tongue is midline.    Motor: There is normal bulk.  There is no tremor.  Strength is 4+/5 in the right arm with hypertonia and 4/5 leg. (likely residual from previous stroke)  Strength is 5/5 in the left arm and leg.    Sensation: Intact to light touch and pin in 4 extremities    Reflexes: deferred    Cerebellar: There is no dysmetria on finger to nose testing.    Gait : deferred      NIH SS:  DATE:  TIME: 1308  1A: Level of consciousness (0-3):   1B: Questions (0-2): 2  1C: Commands (0-2):   2: Gaze (0-2):   3: Visual fields (0-3):   4: Facial palsy (0-3): 1  MOTOR:  5A: Left arm motor drift (0-4):   5B: Right arm motor drift (0-4): 1 (residual)  6A: Left leg motor drift (0-4):   6B: Right leg motor drift (0-4): 1 (residual)  7: Limb ataxia (0-2):   SENSORY:  8: Sensation (0-2):   SPEECH:  9: Language (0-3): 3  10: Dysarthria (0-2):   EXTINCTION:  11: Extinction/inattention (0-2):     TOTAL SCORE: 8    prehospital mRS= 3        LABS:                         10.6   4.99  )-----------( 233      ( 15 Dec 2024 17:21 )             31.9       12-15    137  |  103  |  17.0  ----------------------------<  89  3.3[L]   |  25.0  |  0.45[L]    Ca    7.5[L]      15 Dec 2024 17:21    TPro  4.8[L]  /  Alb  2.7[L]  /  TBili  0.4  /  DBili  x   /  AST  29  /  ALT  39  /  AlkPhos  73  12-15      PT/INR - ( 15 Dec 2024 17:21 )   PT: 12.2 sec;   INR: 1.05 ratio         PTT - ( 15 Dec 2024 17:21 )  PTT:28.9 sec    11-25 Chol 88 LDL -- HDL 34[L] Trig 93    A1C:         RADIOLOGY & ADDITIONAL STUDIES (independently reviewed unless otherwise noted):      CT Brain Stroke Protocol (12.15.24 @ 17:33) >  IMPRESSION:  No acute hemorrhage.  Stent within an ovoid dense structure with mass effect on the amy   consistent with stented basilar artery aneurysm, not significantly   changed.  Loss of gray-white junction in the right lateral cerebellum which may be   larger in size compared to 11/25/2024. Cannot exclude enlarging subacute   infarct.  Encephalomalacia and gliosis in the right medial occipital lobe, not   significantly changed.      CT Brain Perfusion Maps Stroke (12.15.24 @ 17:44)   IMPRESSION:    CT PERFUSION:  Technical limitations: None.    Core infarction: 0 ml  Penumbra / tissue at risk for active ischemia: 0 ml    CTA NECK:  Poor visualization of the proximal left vertebral artery. There is   extensive injection lateral vessel contamination.  Calcified plaque bilateral proximal ICA with less than 50% stenosis based   on NASCET criteria.    CTA HEAD:  No large vessel occlusion, significant stenosis or vascular abnormality   identified.  Stent in the basilar artery with excluded basilar aneurysm, not   significantly changed.      Xray Chest 1 View- PORTABLE-Urgent (12.15.24 @ 18:40)   FINDINGS:    Single frontal view of the chest demonstrates the lungs to be clear. The   cardiomediastinal silhouette is unremarkable. No acute osseous   abnormalities. Overlying EKG leads and wires are noted    IMPRESSION: No acute cardiopulmonary disease process.      TTE W or WO Ultrasound Enhancing Agent (11.25.24 @ 16:55)      CONCLUSIONS:      1. Left atrium is normal in size.   2. Mild left ventricular hypertrophy.   3. Left ventricular cavity is normal in size. Left ventricular systolic function is normal with an ejection fraction of 54 % by Dejesus's method of disks. There are no regional wall motion abnormalities seen.   4. There is mild (grade 1) left ventricular diastolic dysfunction.   5. The right atrium is normal in size.   6. Normal right ventricular cavity size, with normal wall thickness, and normal right ventricular systolic function.   7. No significant valvular disease.   8. No pericardial effusion seen.       Seen and examined by Stroke team attending/team, assessment/ plan as discussed with stroke team attending/team as noted.                                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 CT Head revealed possible enlarging subacute Right lateral cerebellar infarction compared to the imaging done 11/25. Patient was admitted to the stroke team for further management.     Of note, patient was recently seen and discharged from [facility] for mechanical fall resulting in nasal fractures, failure to thrive and possible vascular dementia.        PAST MEDICAL & SURGICAL HISTORY:  CVA (cerebrovascular accident)  HTN (hypertension)  H/O aneurysm  S/P cerebral aneurysm repair          MEDICATIONS  (STANDING):  aspirin enteric coated 81 milliGRAM(s) Oral daily  atorvastatin 40 milliGRAM(s) Oral at bedtime  baclofen 5 milliGRAM(s) Oral every 8 hours  dronabinol 2.5 milliGRAM(s) Oral two times a day  famotidine    Tablet 20 milliGRAM(s) Oral two times a day  heparin   Injectable 5000 Unit(s) SubCutaneous every 12 hours  mirtazapine 7.5 milliGRAM(s) Oral daily  pantoprazole    Tablet 40 milliGRAM(s) Oral before breakfast  QUEtiapine 25 milliGRAM(s) Oral at bedtime  sertraline 50 milliGRAM(s) Oral daily  ticagrelor 90 milliGRAM(s) Oral every 12 hours    MEDICATIONS  (PRN):  acetaminophen     Tablet .. 650 milliGRAM(s) Oral every 6 hours PRN Temp greater or equal to 38C (100.4F), Mild Pain (1 - 3)  aluminum hydroxide/magnesium hydroxide/simethicone Suspension 30 milliLiter(s) Oral every 4 hours PRN Dyspepsia  aspirin Suppository 300 milliGRAM(s) Rectal daily PRN If not able to take PO. DO not give if takes oral ASA  melatonin 3 milliGRAM(s) Oral at bedtime PRN Insomnia  ondansetron Injectable 4 milliGRAM(s) IV Push every 8 hours PRN Nausea and/or Vomiting      Allergies    No Known Allergies    Intolerances        SOCIAL HISTORY:  no tob,   no alcohol   no drugs    FAMILY HISTORY:        ROS: 14 point ROS negative other than what is present in HPI or below    Vital Signs Last 24 Hrs  T(C): 36.4 (16 Dec 2024 07:57), Max: 36.6 (15 Dec 2024 23:43)  T(F): 97.6 (16 Dec 2024 07:57), Max: 97.8 (15 Dec 2024 23:43)  HR: 59 (16 Dec 2024 07:57) (51 - 68)  BP: 158/83 (16 Dec 2024 07:57) (136/71 - 193/83)  BP(mean): --  RR: 17 (16 Dec 2024 07:57) (16 - 19)  SpO2: 100% (16 Dec 2024 07:57) (94% - 100%)    Parameters below as of 16 Dec 2024 07:57  Patient On (Oxygen Delivery Method): room air      Physical Exam:  General: No acute distress.   HEENT: Head normocephalic, atraumatic. Conjunctivae clear w/o exudates or hemorrhage.  Neck: Supple, no adenopathy. Trachea midline. No JVD.    Respiratory: Lung sounds clear in all fields. Chest wall symmetric, nontender.   Abdominal: Soft, nondistended, nontender.   Skin: Skin is warm, dry and intact without rashes or lesions.   Extremities: No edema, Full range of motion in all joints.     Detailed Neurologic Exam:    Mental status: The patient is sleepy but rouses to minor verbal stimuli. patient is non verbal nods yes and no, able to follow some commands.    Cranial nerves: Pupils equal and react symmetrically to light. There is no visual field deficit to confrontation. Extraocular motion is full with no nystagmus. There is no ptosis. Facial sensation is intact. Right facial asymmetry  - mild loss of nasolabial fold.  Palate elevates symmetrically. Tongue is midline.    Motor: There is normal bulk.  There is no tremor.  Strength is 4+/5 in the right arm with hypertonia and 4/5 leg. (likely residual from previous stroke)  Strength is 5/5 in the left arm and leg.    Sensation: Intact to light touch and pin in 4 extremities    Reflexes: deferred    Cerebellar: There is no dysmetria on finger to nose testing.    Gait : deferred      NIH SS:  DATE:  TIME: 1308  1A: Level of consciousness (0-3):   1B: Questions (0-2): 2  1C: Commands (0-2):   2: Gaze (0-2):   3: Visual fields (0-3):   4: Facial palsy (0-3): 1  MOTOR:  5A: Left arm motor drift (0-4):   5B: Right arm motor drift (0-4): 1 (residual)  6A: Left leg motor drift (0-4):   6B: Right leg motor drift (0-4): 1 (residual)  7: Limb ataxia (0-2):   SENSORY:  8: Sensation (0-2):   SPEECH:  9: Language (0-3): 3  10: Dysarthria (0-2):   EXTINCTION:  11: Extinction/inattention (0-2):     TOTAL SCORE: 8    prehospital mRS= 3        LABS:                         10.6   4.99  )-----------( 233      ( 15 Dec 2024 17:21 )             31.9       12-15    137  |  103  |  17.0  ----------------------------<  89  3.3[L]   |  25.0  |  0.45[L]    Ca    7.5[L]      15 Dec 2024 17:21    TPro  4.8[L]  /  Alb  2.7[L]  /  TBili  0.4  /  DBili  x   /  AST  29  /  ALT  39  /  AlkPhos  73  12-15      PT/INR - ( 15 Dec 2024 17:21 )   PT: 12.2 sec;   INR: 1.05 ratio         PTT - ( 15 Dec 2024 17:21 )  PTT:28.9 sec    11-25 Chol 88 LDL -- HDL 34[L] Trig 93    A1C:         RADIOLOGY & ADDITIONAL STUDIES (independently reviewed unless otherwise noted):      CT Brain Stroke Protocol (12.15.24 @ 17:33) >  IMPRESSION:  No acute hemorrhage.  Stent within an ovoid dense structure with mass effect on the amy   consistent with stented basilar artery aneurysm, not significantly   changed.  Loss of gray-white junction in the right lateral cerebellum which may be   larger in size compared to 11/25/2024. Cannot exclude enlarging subacute   infarct.  Encephalomalacia and gliosis in the right medial occipital lobe, not   significantly changed.      CT Brain Perfusion Maps Stroke (12.15.24 @ 17:44)   IMPRESSION:    CT PERFUSION:  Technical limitations: None.    Core infarction: 0 ml  Penumbra / tissue at risk for active ischemia: 0 ml    CTA NECK:  Poor visualization of the proximal left vertebral artery. There is   extensive injection lateral vessel contamination.  Calcified plaque bilateral proximal ICA with less than 50% stenosis based   on NASCET criteria.    CTA HEAD:  No large vessel occlusion, significant stenosis or vascular abnormality   identified.  Stent in the basilar artery with excluded basilar aneurysm, not   significantly changed.      Xray Chest 1 View- PORTABLE-Urgent (12.15.24 @ 18:40)   FINDINGS:    Single frontal view of the chest demonstrates the lungs to be clear. The   cardiomediastinal silhouette is unremarkable. No acute osseous   abnormalities. Overlying EKG leads and wires are noted    IMPRESSION: No acute cardiopulmonary disease process.      TTE W or WO Ultrasound Enhancing Agent (11.25.24 @ 16:55)      CONCLUSIONS:      1. Left atrium is normal in size.   2. Mild left ventricular hypertrophy.   3. Left ventricular cavity is normal in size. Left ventricular systolic function is normal with an ejection fraction of 54 % by Dejesus's method of disks. There are no regional wall motion abnormalities seen.   4. There is mild (grade 1) left ventricular diastolic dysfunction.   5. The right atrium is normal in size.   6. Normal right ventricular cavity size, with normal wall thickness, and normal right ventricular systolic function.   7. No significant valvular disease.   8. No pericardial effusion seen.

## 2024-12-16 NOTE — SWALLOW BEDSIDE ASSESSMENT ADULT - SLP PERTINENT HISTORY OF CURRENT PROBLEM
Pt known to this service from recent admission, s/p MBS study 12/2/24 with rx for puree/moderately thick liquids via cup/tsp; overall limited assessment 2* pts cognition/restlessness (please see full report for details)

## 2024-12-16 NOTE — CHART NOTE - NSCHARTNOTEFT_GEN_A_CORE
Called by RN stating patient's left hand felt cold compared to his other extremities. Patient admitted for right-sided stroke with left-sided weakness, history of right-sided weakness from past stroke. Patient examined at bedside, A&Ox1. Left hand feels cold but only slightly colder that right with mild swelling that has been present since AM. Radial pulses +2 bilaterally, no pallor noticed. Capillary refill <2 seconds. Continue to monitor. RN to call with any changes in patient's status

## 2024-12-16 NOTE — PATIENT PROFILE ADULT - FALL HARM RISK - HARM RISK INTERVENTIONS
Assistance with ambulation/Assistance OOB with selected safe patient handling equipment/Communicate Risk of Fall with Harm to all staff/Discuss with provider need for PT consult/Monitor for mental status changes/Monitor gait and stability/Move patient closer to nurses' station/Provide patient with walking aids - walker, cane, crutches/Reinforce activity limits and safety measures with patient and family/Reorient to person, place and time as needed/Tailored Fall Risk Interventions/Toileting schedule using arm’s reach rule for commode and bathroom/Use of alarms - bed, chair and/or voice tab/Visual Cue: Yellow wristband and red socks/Bed in lowest position, wheels locked, appropriate side rails in place/Call bell, personal items and telephone in reach/Instruct patient to call for assistance before getting out of bed or chair/Non-slip footwear when patient is out of bed/[place] to call system/Physically safe environment - no spills, clutter or unnecessary equipment/Purposeful Proactive Rounding/Room/bathroom lighting operational, light cord in reach

## 2024-12-16 NOTE — SWALLOW BEDSIDE ASSESSMENT ADULT - SLP GENERAL OBSERVATIONS
Pt recd asleep in bed, arousable with cues, however with eyes primarily closed, nonverbal, not following commands, swatting at clinician at times and shaking head "no," nonverbal pain 0/10 pre/post

## 2024-12-17 LAB
ALBUMIN SERPL ELPH-MCNC: 3.4 G/DL — SIGNIFICANT CHANGE UP (ref 3.3–5.2)
ALP SERPL-CCNC: 94 U/L — SIGNIFICANT CHANGE UP (ref 40–120)
ALT FLD-CCNC: 41 U/L — HIGH
ANION GAP SERPL CALC-SCNC: 14 MMOL/L — SIGNIFICANT CHANGE UP (ref 5–17)
ANION GAP SERPL CALC-SCNC: 15 MMOL/L — SIGNIFICANT CHANGE UP (ref 5–17)
AST SERPL-CCNC: 28 U/L — SIGNIFICANT CHANGE UP
BILIRUB SERPL-MCNC: 0.6 MG/DL — SIGNIFICANT CHANGE UP (ref 0.4–2)
BUN SERPL-MCNC: 11.6 MG/DL — SIGNIFICANT CHANGE UP (ref 8–20)
BUN SERPL-MCNC: 12.2 MG/DL — SIGNIFICANT CHANGE UP (ref 8–20)
CALCIUM SERPL-MCNC: 9.4 MG/DL — SIGNIFICANT CHANGE UP (ref 8.4–10.5)
CALCIUM SERPL-MCNC: 9.5 MG/DL — SIGNIFICANT CHANGE UP (ref 8.4–10.5)
CHLORIDE SERPL-SCNC: 101 MMOL/L — SIGNIFICANT CHANGE UP (ref 96–108)
CHLORIDE SERPL-SCNC: 99 MMOL/L — SIGNIFICANT CHANGE UP (ref 96–108)
CHOLEST SERPL-MCNC: 97 MG/DL — SIGNIFICANT CHANGE UP
CO2 SERPL-SCNC: 24 MMOL/L — SIGNIFICANT CHANGE UP (ref 22–29)
CO2 SERPL-SCNC: 24 MMOL/L — SIGNIFICANT CHANGE UP (ref 22–29)
CREAT SERPL-MCNC: 0.46 MG/DL — LOW (ref 0.5–1.3)
CREAT SERPL-MCNC: 0.52 MG/DL — SIGNIFICANT CHANGE UP (ref 0.5–1.3)
EGFR: 110 ML/MIN/1.73M2 — SIGNIFICANT CHANGE UP
EGFR: 114 ML/MIN/1.73M2 — SIGNIFICANT CHANGE UP
GLUCOSE SERPL-MCNC: 81 MG/DL — SIGNIFICANT CHANGE UP (ref 70–99)
GLUCOSE SERPL-MCNC: 88 MG/DL — SIGNIFICANT CHANGE UP (ref 70–99)
HCT VFR BLD CALC: 34.1 % — LOW (ref 39–50)
HDLC SERPL-MCNC: 40 MG/DL — LOW
HGB BLD-MCNC: 11.7 G/DL — LOW (ref 13–17)
LIPID PNL WITH DIRECT LDL SERPL: 38 MG/DL — SIGNIFICANT CHANGE UP
MAGNESIUM SERPL-MCNC: 1.8 MG/DL — SIGNIFICANT CHANGE UP (ref 1.6–2.6)
MAGNESIUM SERPL-MCNC: 1.9 MG/DL — SIGNIFICANT CHANGE UP (ref 1.8–2.6)
MCHC RBC-ENTMCNC: 30.1 PG — SIGNIFICANT CHANGE UP (ref 27–34)
MCHC RBC-ENTMCNC: 34.3 G/DL — SIGNIFICANT CHANGE UP (ref 32–36)
MCV RBC AUTO: 87.7 FL — SIGNIFICANT CHANGE UP (ref 80–100)
NON HDL CHOLESTEROL: 57 MG/DL — SIGNIFICANT CHANGE UP
PHOSPHATE SERPL-MCNC: 2.8 MG/DL — SIGNIFICANT CHANGE UP (ref 2.4–4.7)
PHOSPHATE SERPL-MCNC: 3.1 MG/DL — SIGNIFICANT CHANGE UP (ref 2.4–4.7)
PLATELET # BLD AUTO: 248 K/UL — SIGNIFICANT CHANGE UP (ref 150–400)
POTASSIUM SERPL-MCNC: 4.1 MMOL/L — SIGNIFICANT CHANGE UP (ref 3.5–5.3)
POTASSIUM SERPL-MCNC: 4.2 MMOL/L — SIGNIFICANT CHANGE UP (ref 3.5–5.3)
POTASSIUM SERPL-SCNC: 4.1 MMOL/L — SIGNIFICANT CHANGE UP (ref 3.5–5.3)
POTASSIUM SERPL-SCNC: 4.2 MMOL/L — SIGNIFICANT CHANGE UP (ref 3.5–5.3)
PROT SERPL-MCNC: 6 G/DL — LOW (ref 6.6–8.7)
RBC # BLD: 3.89 M/UL — LOW (ref 4.2–5.8)
RBC # FLD: 14.1 % — SIGNIFICANT CHANGE UP (ref 10.3–14.5)
SODIUM SERPL-SCNC: 136 MMOL/L — SIGNIFICANT CHANGE UP (ref 135–145)
SODIUM SERPL-SCNC: 140 MMOL/L — SIGNIFICANT CHANGE UP (ref 135–145)
TRIGL SERPL-MCNC: 94 MG/DL — SIGNIFICANT CHANGE UP
TROPONIN T, HIGH SENSITIVITY RESULT: 51 NG/L — SIGNIFICANT CHANGE UP (ref 0–51)
TROPONIN T, HIGH SENSITIVITY RESULT: 52 NG/L — HIGH (ref 0–51)
TROPONIN T, HIGH SENSITIVITY RESULT: 55 NG/L — HIGH (ref 0–51)
WBC # BLD: 5.18 K/UL — SIGNIFICANT CHANGE UP (ref 3.8–10.5)
WBC # FLD AUTO: 5.18 K/UL — SIGNIFICANT CHANGE UP (ref 3.8–10.5)

## 2024-12-17 PROCEDURE — 99233 SBSQ HOSP IP/OBS HIGH 50: CPT

## 2024-12-17 PROCEDURE — 93010 ELECTROCARDIOGRAM REPORT: CPT

## 2024-12-17 PROCEDURE — 70450 CT HEAD/BRAIN W/O DYE: CPT | Mod: 26

## 2024-12-17 PROCEDURE — 93970 EXTREMITY STUDY: CPT | Mod: 26

## 2024-12-17 PROCEDURE — 95816 EEG AWAKE AND DROWSY: CPT | Mod: 26

## 2024-12-17 RX ORDER — ACETAMINOPHEN 80 MG/.8ML
1000 SOLUTION/ DROPS ORAL ONCE
Refills: 0 | Status: COMPLETED | OUTPATIENT
Start: 2024-12-17 | End: 2024-12-17

## 2024-12-17 RX ORDER — SODIUM CHLORIDE 9 MG/ML
1000 INJECTION, SOLUTION INTRAVENOUS
Refills: 0 | Status: DISCONTINUED | OUTPATIENT
Start: 2024-12-17 | End: 2024-12-26

## 2024-12-17 RX ORDER — ASPIRIN 81 MG
300 TABLET, DELAYED RELEASE (ENTERIC COATED) ORAL DAILY
Refills: 0 | Status: DISCONTINUED | OUTPATIENT
Start: 2024-12-17 | End: 2024-12-19

## 2024-12-17 RX ADMIN — HEPARIN SODIUM 5000 UNIT(S): 1000 INJECTION, SOLUTION INTRAVENOUS; SUBCUTANEOUS at 17:39

## 2024-12-17 RX ADMIN — HEPARIN SODIUM 5000 UNIT(S): 1000 INJECTION, SOLUTION INTRAVENOUS; SUBCUTANEOUS at 05:06

## 2024-12-17 RX ADMIN — SODIUM CHLORIDE 75 MILLILITER(S): 9 INJECTION, SOLUTION INTRAVENOUS at 14:35

## 2024-12-17 RX ADMIN — ACETAMINOPHEN 400 MILLIGRAM(S): 80 SOLUTION/ DROPS ORAL at 23:53

## 2024-12-17 RX ADMIN — Medication 300 MILLIGRAM(S): at 17:52

## 2024-12-17 NOTE — PHYSICAL THERAPY INITIAL EVALUATION ADULT - LEVEL OF INDEPENDENCE: SIT/STAND, REHAB EVAL
nable to attain full upright due to wekaness, decreased balance/maximum assist (25% patients effort)

## 2024-12-17 NOTE — PHYSICAL THERAPY INITIAL EVALUATION ADULT - PERTINENT HX OF CURRENT PROBLEM, REHAB EVAL
Patient is a 69 year old male with a past medical history of a partially thrombosed fusiform basilar artery aneurysm resulting in cerebral dysfunction, right-sided paralysis, and current nonverbal status with behavioral disturbances (treated at [facility] from June 14th to July 17th, 2024) presents from [facility] after a mechanical fall. He had a recent admission was for mildly elevated troponin levels, failure to thrive, and altered mental status suspected to be due to worsening vascular dementia. He presented with reportedly developed new left-sided weakness, which has since improved. He has a right ocular implant (non-MR compatible). Head imaging demonstrates a possible enlarging right cerebellar subacute infarction compared to imaging from November. He was subsequently admitted to the Stroke service for rule out CVA. MRI was unable to be obtained due to ocular implant. Palliative was consulted for assistance with goals of care. SLP evaluated patient with recommendations for NPO. CTH was repeated today which showed "moderate to severe chronic microvascular changes without evidence of an acute transcortical infarction or hemorrhage." EEG is pending to rule out seizure. Patient now being transferred from Neurology to Medicine.

## 2024-12-17 NOTE — PROGRESS NOTE ADULT - SUBJECTIVE AND OBJECTIVE BOX
[facility] Division of Hospital Medicine  Conor Brenner MD    Chief Complaint:  Patient is a 69y old  Male who presents with a chief complaint of Stroke (16 Dec 2024 09:33)      SUBJECTIVE / OVERNIGHT EVENTS:  Patient seen and examined at bedside. No acute events reported overnight. No new complaints.    MEDICATIONS  (STANDING):  aspirin Suppository 300 milliGRAM(s) Rectal daily  heparin   Injectable 5000 Unit(s) SubCutaneous every 12 hours    MEDICATIONS  (PRN):  ondansetron Injectable 4 milliGRAM(s) IV Push every 8 hours PRN Nausea and/or Vomiting        I&O's Summary    16 Dec 2024 07:01  -  17 Dec 2024 07:00  --------------------------------------------------------  IN: 0 mL / OUT: 1225 mL / NET: -1225 mL        PHYSICAL EXAM:  Vital Signs Last 24 Hrs  T(C): 36.5 (17 Dec 2024 08:00), Max: 36.8 (17 Dec 2024 03:21)  T(F): 97.7 (17 Dec 2024 08:00), Max: 98.2 (17 Dec 2024 03:21)  HR: 76 (17 Dec 2024 10:00) (61 - 85)  BP: 131/78 (17 Dec 2024 10:00) (131/78 - 161/91)  BP(mean): 92 (17 Dec 2024 10:00) (92 - 111)  RR: 14 (17 Dec 2024 10:00) (10 - 18)  SpO2: 93% (17 Dec 2024 10:00) (93% - 100%)    Parameters below as of 17 Dec 2024 10:00  Patient On (Oxygen Delivery Method): room air            CONSTITUTIONAL: NAD  HEENT: NC/AT, PERRL, no JVD  RESPIRATORY: CTA bilaterally, normal effort  CARDIOVASCULAR: RRR, S1/S2+, no m/g/r  ABDOMEN: Nontender to palpation, normoactive bowel sounds, no rebound/guarding  MUSCULOSKELETAL: No edema, cyanosis or deformities.  PSYCH: Calm, affect appropriate.  NEUROLOGY: Awake, alert, no focal neurological deficits.   SKIN: No rashes; no palpable lesions  VASC: Distal pulses palpable    LABS:                        11.7   5.18  )-----------( 248      ( 17 Dec 2024 05:52 )             34.1     12-17    136  |  99  |  12.2  ----------------------------<  88  4.2   |  24.0  |  0.52    Ca    9.5      17 Dec 2024 10:50  Phos  3.1     12-17  Mg     1.9     12-17    TPro  6.0[L]  /  Alb  3.4  /  TBili  0.6  /  DBili  x   /  AST  28  /  ALT  41[H]  /  AlkPhos  94  12-17    PT/INR - ( 15 Dec 2024 17:21 )   PT: 12.2 sec;   INR: 1.05 ratio         PTT - ( 15 Dec 2024 17:21 )  PTT:28.9 sec      Urinalysis Basic - ( 17 Dec 2024 10:50 )    Color: x / Appearance: x / SG: x / pH: x  Gluc: 88 mg/dL / Ketone: x  / Bili: x / Urobili: x   Blood: x / Protein: x / Nitrite: x   Leuk Esterase: x / RBC: x / WBC x   Sq Epi: x / Non Sq Epi: x / Bacteria: x        CAPILLARY BLOOD GLUCOSE            RADIOLOGY & ADDITIONAL TESTS:  Results Reviewed:   Imaging Personally Reviewed:  Electrocardiogram Personally Reviewed:                                           [facility] Division of Hospital Medicine  Conor Brenner MD    Chief Complaint:  Patient is a 69y old  Male who presents with a chief complaint of Stroke (16 Dec 2024 09:33)      SUBJECTIVE / OVERNIGHT EVENTS:  Patient seen and examined at bedside. Unable to obtain ROS.     MEDICATIONS  (STANDING):  aspirin Suppository 300 milliGRAM(s) Rectal daily  heparin   Injectable 5000 Unit(s) SubCutaneous every 12 hours    MEDICATIONS  (PRN):  ondansetron Injectable 4 milliGRAM(s) IV Push every 8 hours PRN Nausea and/or Vomiting        I&O's Summary    16 Dec 2024 07:01  -  17 Dec 2024 07:00  --------------------------------------------------------  IN: 0 mL / OUT: 1225 mL / NET: -1225 mL        PHYSICAL EXAM:  Vital Signs Last 24 Hrs  T(C): 36.5 (17 Dec 2024 08:00), Max: 36.8 (17 Dec 2024 03:21)  T(F): 97.7 (17 Dec 2024 08:00), Max: 98.2 (17 Dec 2024 03:21)  HR: 76 (17 Dec 2024 10:00) (61 - 85)  BP: 131/78 (17 Dec 2024 10:00) (131/78 - 161/91)  BP(mean): 92 (17 Dec 2024 10:00) (92 - 111)  RR: 14 (17 Dec 2024 10:00) (10 - 18)  SpO2: 93% (17 Dec 2024 10:00) (93% - 100%)    Parameters below as of 17 Dec 2024 10:00  Patient On (Oxygen Delivery Method): room air      CONSTITUTIONAL: Alert, open eyes/tracks, does not follow commands.   HEENT: NC/AT, PERRL, no JVD  RESPIRATORY: CTA bilaterally, normal effort  CARDIOVASCULAR: RRR, S1/S2+, no m/g/r  ABDOMEN: Nontender to palpation, normoactive bowel sounds, no rebound/guarding  MUSCULOSKELETAL: No edema, cyanosis or deformities.  PSYCH: Calm, affect appropriate.  NEUROLOGY: Awake, R sided weakness  SKIN: No rashes; no palpable lesions  VASC: Distal pulses palpable    LABS:                        11.7   5.18  )-----------( 248      ( 17 Dec 2024 05:52 )             34.1     12-17    136  |  99  |  12.2  ----------------------------<  88  4.2   |  24.0  |  0.52    Ca    9.5      17 Dec 2024 10:50  Phos  3.1     12-17  Mg     1.9     12-17    TPro  6.0[L]  /  Alb  3.4  /  TBili  0.6  /  DBili  x   /  AST  28  /  ALT  41[H]  /  AlkPhos  94  12-17    PT/INR - ( 15 Dec 2024 17:21 )   PT: 12.2 sec;   INR: 1.05 ratio         PTT - ( 15 Dec 2024 17:21 )  PTT:28.9 sec      Urinalysis Basic - ( 17 Dec 2024 10:50 )    Color: x / Appearance: x / SG: x / pH: x  Gluc: 88 mg/dL / Ketone: x  / Bili: x / Urobili: x   Blood: x / Protein: x / Nitrite: x   Leuk Esterase: x / RBC: x / WBC x   Sq Epi: x / Non Sq Epi: x / Bacteria: x        CAPILLARY BLOOD GLUCOSE            RADIOLOGY & ADDITIONAL TESTS:  Results Reviewed:   Imaging Personally Reviewed:  Electrocardiogram Personally Reviewed:

## 2024-12-17 NOTE — EEG REPORT - NS EEG TEXT BOX
OMAR LAYNE [id number]     Study Date: 		12-17-24    --------------------------------------------------------------------------------------------------  History:  CC/ HPI Patient is a 69y old  Male who presents with a chief complaint of Stroke (16 Dec 2024 09:33)    MEDICATIONS  (STANDING):  aspirin Suppository 300 milliGRAM(s) Rectal daily  dextrose 5% + sodium chloride 0.9%. 1000 milliLiter(s) (75 mL/Hr) IV Continuous <Continuous>  heparin   Injectable 5000 Unit(s) SubCutaneous every 12 hours    --------------------------------------------------------------------------------------------------  Study Interpretation:    [[[Abbreviation Key:  PDR=alpha rhythm/posterior dominant rhythm. A-P=anterior posterior gradient.  Amplitude: ‘very low’:<20; ‘low’:20-50; ‘medium’:; ‘high’:>200uV.  Persistence for periodic/rhythmic patterns (% of epoch) ‘rare’:<1%; ‘occasional’:1-10%; ‘frequent’:10-50%; ‘abundant’:50-90%; ‘continuous’:>90%.  Persistence for sporadic discharges: ‘rare’:<1/hr; ‘occasional’:1/min-1/hr; ‘frequent’:>1/min; ‘abundant’:>1/10 sec.  GRDA=generalized rhythmic delta activity; FIRDA=frontal intermittent GRDA; LRDA=lateralized rhythmic delta activity; TIRDA=temporal intermittent rhythmic delta activity;  LPD=PLED=lateralized periodic discharges; GPD=generalized periodic discharges; BiPDs=BiPLEDs=bilateral independent periodic epileptiform discharges; SIRPID=stimulus induced rhythmic, periodic, or ictal appearing discharges; BIRDs=brief potentially ictal rhythmic discharges >4 Hz, lasting .5-10s; PFA=paroxysmal bursts of beta/gamma; LVFA=low voltage fast activity.  Modifiers: +F=with fast component; +S=with spike component; +R=with rhythmic component.  S-B=burst suppression pattern.  Max=maximal. N1-drowsy; N2-stage II sleep; N3-slow wave sleep. SSS/BETS=small sharp spikes/benign epileptiform transients of sleep. HV=hyperventilation; PS=photic stimulation]]]    FINDINGS:      Background:  Continuity: continuous  Symmetry: symmetric  PDR: 7.5-8Hz activity, with amplitude to 40 uV, that attenuated to eye opening.    Reactivity: present  Voltage: normal (between 20-150uV)  Anterior Posterior Gradient: present  Other background findings: none  Breach: absent    Background Slowing:  Bihemispheric independent asynchronous irregular frontotemporal maximal irregular delta and theta activity.   Slowing intermittently but more prominent  in amplitude over the left    State Changes:   -Sleep transients were not recorded.    Sporadic Epileptiform Discharges:    None    Rhythmic and Periodic Patterns (RPPs):  None     Electrographic and Electroclinical seizures:  None    Other Clinical Events:  None    Activation Procedures:   -Hyperventilation was not performed.    -Photic stimulation was performed and did not elicit any abnormalities.       Artifacts:  Intermittent myogenic and movement artifacts were noted.    ECG:  The heart rate on single channel ECG was predominantly between BPM = 54-60    EEG Classification / Summary:  Abnormal EEG study  Diffuse asynchronous background slowing  Slowing higher amplitude over the left frontotemporal region    -----------------------------------------------------------------------------------------------------    Clinical Impression:  Mild  multifocal cerebral dysfunction.  Amplitude asymmetry favors focal cerebral dysfunction most prominently in the left frontal temporal region.  There were no epileptiform abnormalities recorded.      -------------------------------------------------------------------------------------------------------  [facility] EEG Reading Room Ph#: [phone]  Epilepsy Answering Service after 5PM and before 8:30AM: Ph#: [phone]    Bud Kohli M.D.   of Neurology, [facility] [place]

## 2024-12-17 NOTE — PHYSICAL THERAPY INITIAL EVALUATION ADULT - MANUAL MUSCLE TESTING RESULTS, REHAB EVAL
see OT eval for UEs; right LE: hip flexion 1/5, knee ext 3/5, ankle DF and PF 1/5, left LE 3+/5 throughout

## 2024-12-17 NOTE — PHYSICAL THERAPY INITIAL EVALUATION ADULT - IMPAIRMENTS FOUND, PT EVAL
gait, locomotion, and balance/gross motor Scc In Situ Histology Text: There were disorganized keratinocytes displaying variable cytologic atypia confined to the full thickness of the epidermis.

## 2024-12-17 NOTE — OCCUPATIONAL THERAPY INITIAL EVALUATION ADULT - DIAGNOSIS, OT EVAL
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

## 2024-12-17 NOTE — PROGRESS NOTE ADULT - ASSESSMENT
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 CT Head revealed possible enlarging subacute Right lateral cerebellar infarction. CTA h/n notable for calcified plque in the b/l ICA <50% stenosis.      IMPRESSION: Etiology of symptoms likely chronic - baseline. Patient is unable to obtain MRI due to ocular implant, pending repeat CTH - if unrevealing will consider a routine EEG to r/o NCES.     NEURO:   -Neurologically with residual right sided weakness , contracted RUE  -Continue close monitoring for neurologic deterioration    -Stroke neuro checks q 2hr  -SBP goal < 180 mmhg, avoid rapid fluctuation and hypotension  -ANTITHROMBOTIC THERAPY: on ASA suppository   -Titrate statin to LDL goal less than 70  -Unable to obtain MRI - ocular implant non MR compatible   -Rpt CTH in 48 hrs from initial insult   -Dysphagia screen: fail , pending repeat s&s eval  -Physical therapy/OT/Speech eval/treatment.     CARDIOVASCULAR:   -TTE 11/24 - EF 54%, left atrium nml, cardiac monitoring w/ telemetry for now, further evaluation pending findings of noted workup                              HEMATOLOGY:   -H/H 11.7 / 34.1, Platelets 248  -Patient should have all age and risk appropriate malignancy screenings with PCP or sooner if clinically suspected   -DVT ppx: Heparin s.c [x] LMWH []     PULMONARY:   -on room air, protecting airway, saturating well     RENAL:   -BUN/Cr 11.6 / 0.46, monitor urine output, maintain adequate hydration    -Na Goal:  135-145  -Villagomez:    ID:   -Afebrile, no leukocytosis, monitor for si/sx of infection     OTHER:    -Condition and plan of care d/w patient, questions and concerns addressed.   -Palliative consult, FULL CODE for now  -Recs appreciated     DISPOSITION:   -Rehab or home depending on PT eval once stable and workup is complete      CORE MEASURES:        Admission NIHSS: 16     Tenecteplase : [] YES [X] NO      LDL/HDL/A1C: 38 / 40 / 5.6     Depression Screen - if depression hx and/or present      Statin Therapy:     Dysphagia Screen: [] PASS [X] FAIL     Smoking [] YES [] NO      Afib [] YES [X] NO     Stroke Education [] YES [] NO    Obtain screening lower extremity venous ultrasound in patients who meet 1 or more of the following criteria as patient is high risk for DVT/PE on admission:   [] History of DVT/PE  [] Hypercoagulable states (Factor V Leiden, Cancer, OCP, etc. 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 CT Head revealed possible enlarging subacute Right lateral cerebellar infarction. CTA h/n notable for calcified plque in the b/l ICA <50% stenosis.  Repeat CTH negative for acute infarct/hemorrhage, noted moderate to severe chronic microvascular changes.    IMPRESSION: No stroke. Etiology of symptoms likely chronic - baseline. Patient is unable to obtain MRI due to ocular implant, repeat CTH negative for acute infarct.   Additional consideration in progression of vascular dementia.    NEURO:   -Neurologically with residual right sided weakness , contracted RUE  -Continue close monitoring for neurologic deterioration    -Stroke neuro checks q 2hr  -SBP goal < 180 mmhg, avoid rapid fluctuation and hypotension  -ANTITHROMBOTIC THERAPY: ASA suppository   -Titrate statin to LDL goal less than 70  -Unable to obtain MRI - ocular implant non MR compatible. Repeat CTH negative.  -Obtain spot EEG to r/o seizure  -Rule out infectious etiology  -Dysphagia screen: fail , pending repeat s&s eval  -Physical therapy/OT/Speech eval/treatment.     CARDIOVASCULAR:   -TTE 11/24 - EF 54%, left atrium nml, cardiac monitoring w/ telemetry for now, further evaluation pending findings of noted workup                              HEMATOLOGY:   -H/H 11.7 / 34.1, Platelets 248  -Patient should have all age and risk appropriate malignancy screenings with PCP or sooner if clinically suspected   -DVT ppx: Heparin s.c [x] LMWH []     PULMONARY:   -on room air, protecting airway, saturating well     RENAL:   -BUN/Cr 11.6 / 0.46, monitor urine output, maintain adequate hydration    -Na Goal:  135-145  -Villagomez:    ID:   -Afebrile, no leukocytosis, monitor for si/sx of infection     OTHER:    -Condition and plan of care d/w patient, questions and concerns addressed.   -Palliative following    DISPOSITION:   -Rehab or home depending on PT eval once stable and workup is complete  -The patient was transferred to the Medicine service on 12/17/24.      CORE MEASURES:        Admission NIHSS: 16     Tenecteplase : [] YES [X] NO      LDL/HDL/A1C: 38 / 40 / 5.6     Depression Screen - if depression hx and/or present      Statin Therapy:     Dysphagia Screen: [] PASS [X] FAIL     Smoking [] YES [] NO      Afib [] YES [X] NO     Stroke Education [] YES [] NO    Obtain screening lower extremity venous ultrasound in patients who meet 1 or more of the following criteria as patient is high risk for DVT/PE on admission:   [] History of DVT/PE  [] Hypercoagulable states (Factor V Leiden, Cancer, OCP, etc. )  [] Prolonged immobility (hemiplegia/hemiparesis/post operative or any other extended immobilization)  [] Transferred from outside facility (Rehab or Long term care)  [] Age </= to 50

## 2024-12-17 NOTE — PROGRESS NOTE ADULT - ASSESSMENT
Patient is a 69 year old male with a past medical history of a partially thrombosed fusiform basilar artery aneurysm resulting in cerebral dysfunction, right-sided paralysis, and current nonverbal status with behavioral disturbances (treated at [facility] from June 14th to July 17th, 2024) presents from a skilled nursing facility ([facility]) after a mechanical fall. He had a recent admission was for mildly elevated troponin levels, failure to thrive, and altered mental status suspected to be due to worsening vascular dementia. He presented with reportedly developed new left-sided weakness, which has since improved. He has a right ocular implant (non-MR compatible). Head imaging demonstrates a possible enlarging right cerebellar subacute infarction compared to imaging from November. He was subsequently admitted to the Stroke service for rule out CVA. MRI was unable to be obtained due to ocular implant. Palliative was consulted for assistance with goals of care. SLP evaluated patient with recommendations for NPO. CTH was repeated today which showed "moderate to severe chronic microvascular changes without evidence of an acute transcortical infarction or hemorrhage." EEG is pending to rule out seizure. Patient now being tranferred from Neurology to Medicine.    L sided weakness, hx of CVA w/ R sided weakness  - Neurology recs appreciated  - Unable to obtain MRI  - Repeat CTH without any new CVA  - Neuro checks  - Continue ASA  - Start Statin, Brillinta once cleared for PO    Acute metabolic encepholopathy   - likely in setting of Vascular dementia with behavioral changes with recent FTT  - Palliative following for assistance with goals of care  - SLP following, recommending NPO. Patient was recommended mod thick liquids/pureed on prior admission  - Neurology requesting SLP follow up today  - Resume home Seroquel, Sertraline, Mirtazapine, Dronabinol once able to tolerate PO  - EEG pending  - Delirium precautions    Advance Directives: Full code, Palliative following    DVT ppx: Heparin SQ    Dispo: Pending GOC, PT eval.    Patient is a 69 year old male with a past medical history of a partially thrombosed fusiform basilar artery aneurysm resulting in cerebral dysfunction, right-sided paralysis, and current nonverbal status with behavioral disturbances (treated at [facility] from June 14th to July 17th, 2024) presents from [facility] after a mechanical fall. He had a recent admission was for mildly elevated troponin levels, failure to thrive, and altered mental status suspected to be due to worsening vascular dementia. He presented with reportedly developed new left-sided weakness, which has since improved. He has a right ocular implant (non-MR compatible). Head imaging demonstrates a possible enlarging right cerebellar subacute infarction compared to imaging from November. He was subsequently admitted to the Stroke service for rule out CVA. MRI was unable to be obtained due to ocular implant. Palliative was consulted for assistance with goals of care. SLP evaluated patient with recommendations for NPO. CTH was repeated today which showed "moderate to severe chronic microvascular changes without evidence of an acute transcortical infarction or hemorrhage." EEG is pending to rule out seizure. Patient now being transferred from Neurology to Medicine.    L sided weakness, hx of CVA w/ R sided weakness  - Neurology recs appreciated  - Unable to obtain MRI  - Repeat CTH without any new CVA  - Neuro checks  - Continue ASA  - Start Statin, Brillinta once cleared for PO    Acute metabolic encepholopathy   - likely in setting of Vascular dementia with behavioral changes with recent FTT  - Palliative following for assistance with goals of care  - SLP following, recommending NPO. Patient was recommended mod thick liquids/pureed on prior admission  - Neurology requesting SLP follow up today  - Resume home Seroquel, Sertraline, Mirtazapine, Dronabinol once able to tolerate PO  - EEG pending  - Delirium precautions    Advance Directives: Full code, Palliative following    DVT ppx: Heparin SQ    Dispo: Pending GOC, PT eval.    Patient is a 69 year old male with a past medical history of a partially thrombosed fusiform basilar artery aneurysm resulting in cerebral dysfunction, right-sided paralysis, and current nonverbal status with behavioral disturbances (treated at [facility] from June 14th to July 17th, 2024) presents from [facility] after a mechanical fall. He had a recent admission was for mildly elevated troponin levels, failure to thrive, and altered mental status suspected to be due to worsening vascular dementia. He presented with reportedly developed new left-sided weakness, which has since improved. He has a right ocular implant (non-MR compatible). Head imaging demonstrates a possible enlarging right cerebellar subacute infarction compared to imaging from November. He was subsequently admitted to the Stroke service for rule out CVA. MRI was unable to be obtained due to ocular implant. Palliative was consulted for assistance with goals of care. SLP evaluated patient with recommendations for NPO. CTH was repeated today which showed "moderate to severe chronic microvascular changes without evidence of an acute transcortical infarction or hemorrhage." EEG is pending to rule out seizure. Patient now being transferred from Neurology to Medicine.    L sided weakness, hx of CVA w/ R sided weakness  - Neurology recs appreciated  - Unable to obtain MRI  - Repeat CTH without any new CVA  - Neuro checks  - Continue ASA  - Start Statin, Brillinta once cleared for PO  - Telemetry    Acute metabolic encephelopathy   - likely in setting of Vascular dementia with behavioral changes with recent FTT  - Palliative following for assistance with goals of care  - SLP following, recommending NPO. Patient was recommended mod thick liquids/pureed on prior admission  - Neurology requesting SLP follow up today  - Start IVFs   - Resume home Seroquel, Sertraline, Mirtazapine, Dronabinol once able to tolerate PO  - EEG pending  - Delirium precautions    Elevated troponin  - appears in no distress  - Trop: 36 -> 40 -> 52 -> 55, continue to trend  - Repeat EKG  - Telemetry  - Troponin was elevated to the 50s on prior admission, he was seen by Cardiology and TTE was unremarkable. Patient was deemed not a candidate for stress testing or invasive procedure. Unlikely NSTEMI per Cardiology    Advance Directives: Full code, Palliative following    DVT ppx: Heparin SQ    Dispo: Pending GOC, PT eval.

## 2024-12-17 NOTE — OCCUPATIONAL THERAPY INITIAL EVALUATION ADULT - IMPAIRED TRANSFERS: SIT/STAND, REHAB EVAL
Patient called stating that he was having some double vision and is not sure if this is related to his surgery. Per Dr. Pulido, told patient that he may still have some swelling in the eye or he may have some changes to the cataract. He does not recommend trying to get glasses as it is too soon after surgery  
impaired balance/impaired motor control/decreased strength

## 2024-12-17 NOTE — PROGRESS NOTE ADULT - SUBJECTIVE AND OBJECTIVE BOX
Preliminary note, official recommendations pending attending review/signature   Seen and examined by Stroke team attending/team, assessment/ plan as discussed with stroke team attending/team as noted.     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 CT Head revealed possible enlarging subacute Right lateral cerebellar infarction compared to the imaging done 11/25/24. Patient was admitted to the stroke team for further management. Of note, patient was recently seen and discharged from [facility] for mechanical fall resulting in nasal fractures, failure to thrive and possible vascular dementia.      SUBJECTIVE: Overnight covering provider was notified by monitor tech for ST depressions. EKG ordered, pt asymptomatic. Stroke team was then notified by RN 12/17/24 for troponin mildly elevated. No new neurologic complaints.  ROS reported negative unless otherwise noted.    aspirin Suppository 300 milliGRAM(s) Rectal daily PRN  heparin   Injectable 5000 Unit(s) SubCutaneous every 12 hours  ondansetron Injectable 4 milliGRAM(s) IV Push every 8 hours PRN      PHYSICAL EXAM:   Vital Signs Last 24 Hrs  T(C): 36.8 (17 Dec 2024 03:45), Max: 36.8 (17 Dec 2024 03:21)  T(F): 98.2 (17 Dec 2024 03:45), Max: 98.2 (17 Dec 2024 03:21)  HR: 78 (17 Dec 2024 06:00) (59 - 78)  BP: 136/95 (17 Dec 2024 06:00) (136/95 - 161/91)  BP(mean): 107 (17 Dec 2024 06:00) (100 - 111)  RR: 10 (17 Dec 2024 06:00) (10 - 18)  SpO2: 100% (17 Dec 2024 06:00) (93% - 100%)    Parameters below as of 17 Dec 2024 06:00  Patient On (Oxygen Delivery Method): room air      Physical Exam:  General: No acute distress.   HEENT: Head normocephalic, atraumatic. Conjunctivae clear w/o exudates or hemorrhage.  Neck: Supple, no adenopathy. Trachea midline. No JVD.    Respiratory: Lung sounds clear in all fields. Chest wall symmetric, nontender.   Abdominal: Soft, nondistended, nontender.   Skin: Skin is warm, dry and intact without rashes or lesions.   Extremities: No edema, Full range of motion in all joints.     Detailed Neurologic Exam:    Mental status: The patient is sleepy but rouses to minor verbal stimuli. patient is non verbal nods yes and no, able to follow some commands.    Cranial nerves: Pupils equal and react symmetrically to light. There is no visual field deficit to confrontation. Extraocular motion is full with no nystagmus. There is no ptosis. Facial sensation is intact. Right facial asymmetry  - mild loss of nasolabial fold.  Palate elevates symmetrically. Tongue is midline.    Motor: There is normal bulk.  There is no tremor.  Strength is 4+/5 in the right arm with hypertonia and 4/5 leg. (likely residual from previous stroke)  Strength is 5/5 in the left arm and leg.    Sensation: Intact to light touch and pin in 4 extremities    Reflexes: deferred    Cerebellar: There is no dysmetria on finger to nose testing.    Gait : deferred      LABS:                        11.7   5.18  )-----------( 248      ( 17 Dec 2024 05:52 )             34.1    12-17    140  |  101  |  11.6  ----------------------------<  81  4.1   |  24.0  |  0.46[L]    Ca    9.4      17 Dec 2024 05:52  Phos  2.8     12-17  Mg     1.8     12-17    TPro  6.0[L]  /  Alb  3.4  /  TBili  0.6  /  DBili  x   /  AST  28  /  ALT  41[H]  /  AlkPhos  94  12-17  PT/INR - ( 15 Dec 2024 17:21 )   PT: 12.2 sec;   INR: 1.05 ratio      PTT - ( 15 Dec 2024 17:21 )  PTT:28.9 sec    LDL: 38  A1C: 5.6      IMAGING: Reviewed by me.     NEURO  CT Brain Stroke Protocol (12.15.24 @ 17:33)  IMPRESSION:  No acute hemorrhage.    Stent within an ovoid dense structure with mass effect on the amy   consistent with stented basilar artery aneurysm, not significantly changed.    Loss of gray-white junction in the right lateral cerebellum which may be   larger in size compared to 11/25/2024. Cannot exclude enlarging subacute infarct.    Encephalomalacia and gliosis in the right medial occipital lobe, not significantly changed.    CT Brain Perfusion Maps Stroke (12.15.24 @ 17:44)   IMPRESSION:    CT PERFUSION:  Technical limitations: None.    Core infarction: 0 ml  Penumbra / tissue at risk for active ischemia: 0 ml    CTA NECK:  Poor visualization of the proximal left vertebral artery.   There is extensive injection lateral vessel contamination.  Calcified plaque bilateral proximal ICA with less than 50% stenosis based   on NASCET criteria.    CTA HEAD:  No large vessel occlusion, significant stenosis or vascular abnormality   identified.  Stent in the basilar artery with excluded basilar aneurysm, not   significantly changed.  --  XRAY  Xray Chest 1 View- PORTABLE-Urgent (12.15.24 @ 18:40)   FINDINGS:  Single frontal view of the chest demonstrates the lungs to be clear. The   cardiomediastinal silhouette is unremarkable. No acute osseous   abnormalities. Overlying EKG leads and wires are noted  IMPRESSION: No acute cardiopulmonary disease process.  --  CARDIAC  TTE W or WO Ultrasound Enhancing Agent (11.25.24 @ 16:55)   CONCLUSIONS:   1. Left atrium is normal in size.   2. Mild left ventricular hypertrophy.   3. Left ventricular cavity is normal in size. Left ventricular systolic function is normal with an ejection fraction of 54 % by Dejesus's method of disks. There are no regional wall motion abnormalities seen.   4. There is mild (grade 1) left ventricular diastolic dysfunction.   5. The right atrium is normal in size.   6. Normal right ventricular cavity size, with normal wall thickness, and normal right ventricular systolic function.   7. No significant valvular disease.   8. No pericardial effusion seen.   Preliminary note, official recommendations pending attending review/signature   Seen and examined by Stroke team attending/team, assessment/ plan as discussed with stroke team attending/team as noted.     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 CT Head revealed possible enlarging subacute Right lateral cerebellar infarction compared to the imaging done 11/25/24. Patient was admitted to the stroke team for further management. Of note, patient was recently seen and discharged from [facility] for mechanical fall resulting in nasal fractures, failure to thrive and possible vascular dementia.      SUBJECTIVE: Overnight covering provider was notified by monitor tech for ST depressions. EKG ordered, pt asymptomatic. Stroke team was then notified by RN 12/17/24 for troponin mildly elevated. No new neurologic complaints.  ROS reported negative unless otherwise noted.    aspirin Suppository 300 milliGRAM(s) Rectal daily PRN  heparin   Injectable 5000 Unit(s) SubCutaneous every 12 hours  ondansetron Injectable 4 milliGRAM(s) IV Push every 8 hours PRN      PHYSICAL EXAM:   Vital Signs Last 24 Hrs  T(C): 36.8 (17 Dec 2024 03:45), Max: 36.8 (17 Dec 2024 03:21)  T(F): 98.2 (17 Dec 2024 03:45), Max: 98.2 (17 Dec 2024 03:21)  HR: 78 (17 Dec 2024 06:00) (59 - 78)  BP: 136/95 (17 Dec 2024 06:00) (136/95 - 161/91)  BP(mean): 107 (17 Dec 2024 06:00) (100 - 111)  RR: 10 (17 Dec 2024 06:00) (10 - 18)  SpO2: 100% (17 Dec 2024 06:00) (93% - 100%)    Parameters below as of 17 Dec 2024 06:00  Patient On (Oxygen Delivery Method): room air      Physical Exam:  General: No acute distress. Laying in bed.  HEENT: Head normocephalic, atraumatic. Conjunctivae clear w/o exudates or hemorrhage.  Neck: Supple, no adenopathy. Trachea midline. No JVD.    Respiratory: Lung sounds clear in all fields. Chest wall symmetric, nontender.   Abdominal: Soft, nondistended, nontender.   Skin: Skin is warm, dry and intact without rashes or lesions.   Extremities: No edema, Full range of motion in all joints.     Detailed Neurologic Exam:    Mental status: The patient is sleepy but rouses to minor verbal stimuli. patient is non verbal nods yes and no, able to follow some commands.    Cranial nerves: Pupils equal and react symmetrically to light. There is no visual field deficit to confrontation. Extraocular motion is full with no nystagmus. There is no ptosis. Facial sensation is intact. Right facial asymmetry  - mild loss of nasolabial fold.  Palate elevates symmetrically. Tongue is midline.    Motor: There is normal bulk.  There is no tremor.  Strength is 4+/5 in the right arm with hypertonia and 4/5 leg. (likely residual from previous stroke)  Strength is 5/5 in the left arm and leg.    Sensation: Intact to light touch and pin in 4 extremities    Reflexes: deferred    Cerebellar: There is no dysmetria on finger to nose testing.    Gait : deferred      LABS:                        11.7   5.18  )-----------( 248      ( 17 Dec 2024 05:52 )             34.1    12-17    140  |  101  |  11.6  ----------------------------<  81  4.1   |  24.0  |  0.46[L]    Ca    9.4      17 Dec 2024 05:52  Phos  2.8     12-17  Mg     1.8     12-17    TPro  6.0[L]  /  Alb  3.4  /  TBili  0.6  /  DBili  x   /  AST  28  /  ALT  41[H]  /  AlkPhos  94  12-17  PT/INR - ( 15 Dec 2024 17:21 )   PT: 12.2 sec;   INR: 1.05 ratio      PTT - ( 15 Dec 2024 17:21 )  PTT:28.9 sec    LDL: 38  A1C: 5.6      IMAGING: Reviewed by me.     NEURO  CT Head No Cont (12.17.24 @ 09:11)  IMPRESSION:  Moderate to severe chronicmicrovascular changes without   evidence of an acute transcortical infarction or hemorrhage.    CT Brain Stroke Protocol (12.15.24 @ 17:33)  IMPRESSION:  No acute hemorrhage.    Stent within an ovoid dense structure with mass effect on the amy   consistent with stented basilar artery aneurysm, not significantly changed.    Loss of gray-white junction in the right lateral cerebellum which may be   larger in size compared to 11/25/2024. Cannot exclude enlarging subacute infarct.    Encephalomalacia and gliosis in the right medial occipital lobe, not significantly changed.    CT Brain Perfusion Maps Stroke (12.15.24 @ 17:44)   IMPRESSION:    CT PERFUSION:  Technical limitations: None.    Core infarction: 0 ml  Penumbra / tissue at risk for active ischemia: 0 ml    CTA NECK:  Poor visualization of the proximal left vertebral artery.   There is extensive injection lateral vessel contamination.  Calcified plaque bilateral proximal ICA with less than 50% stenosis based   on NASCET criteria.    CTA HEAD:  No large vessel occlusion, significant stenosis or vascular abnormality   identified.  Stent in the basilar artery with excluded basilar aneurysm, not   significantly changed.  --  XRAY  Xray Chest 1 View- PORTABLE-Urgent (12.15.24 @ 18:40)   FINDINGS:  Single frontal view of the chest demonstrates the lungs to be clear. The   cardiomediastinal silhouette is unremarkable. No acute osseous   abnormalities. Overlying EKG leads and wires are noted  IMPRESSION: No acute cardiopulmonary disease process.  --  CARDIAC  TTE W or WO Ultrasound Enhancing Agent (11.25.24 @ 16:55)   CONCLUSIONS:   1. Left atrium is normal in size.   2. Mild left ventricular hypertrophy.   3. Left ventricular cavity is normal in size. Left ventricular systolic function is normal with an ejection fraction of 54 % by Dejesus's method of disks. There are no regional wall motion abnormalities seen.   4. There is mild (grade 1) left ventricular diastolic dysfunction.   5. The right atrium is normal in size.   6. Normal right ventricular cavity size, with normal wall thickness, and normal right ventricular systolic function.   7. No significant valvular disease.   8. No pericardial effusion seen.   Seen and examined by Stroke team attending/team, assessment/ plan as discussed with stroke team attending/team as noted.     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 CT Head revealed possible enlarging subacute Right lateral cerebellar infarction compared to the imaging done 11/25/24. Patient was admitted to the stroke team for further management. Of note, patient was recently seen and discharged from [facility] for mechanical fall resulting in nasal fractures, failure to thrive and possible vascular dementia.      SUBJECTIVE: Overnight covering provider was notified by monitor tech for ST depressions. EKG ordered, pt asymptomatic. Stroke team was then notified by RN 12/17/24 for troponin mildly elevated. No new neurologic complaints.  ROS reported negative unless otherwise noted.    aspirin Suppository 300 milliGRAM(s) Rectal daily PRN  heparin   Injectable 5000 Unit(s) SubCutaneous every 12 hours  ondansetron Injectable 4 milliGRAM(s) IV Push every 8 hours PRN      PHYSICAL EXAM:   Vital Signs Last 24 Hrs  T(C): 36.8 (17 Dec 2024 03:45), Max: 36.8 (17 Dec 2024 03:21)  T(F): 98.2 (17 Dec 2024 03:45), Max: 98.2 (17 Dec 2024 03:21)  HR: 78 (17 Dec 2024 06:00) (59 - 78)  BP: 136/95 (17 Dec 2024 06:00) (136/95 - 161/91)  BP(mean): 107 (17 Dec 2024 06:00) (100 - 111)  RR: 10 (17 Dec 2024 06:00) (10 - 18)  SpO2: 100% (17 Dec 2024 06:00) (93% - 100%)    Parameters below as of 17 Dec 2024 06:00  Patient On (Oxygen Delivery Method): room air      Physical Exam:  General: No acute distress. Laying in bed.  HEENT: Head normocephalic, atraumatic. Conjunctivae clear w/o exudates or hemorrhage.  Neck: Supple, no adenopathy. Trachea midline. No JVD.    Respiratory: Lung sounds clear in all fields. Chest wall symmetric, nontender.   Abdominal: Soft, nondistended, nontender.   Skin: Skin is warm, dry and intact without rashes or lesions.   Extremities: No edema, Full range of motion in all joints.     Detailed Neurologic Exam:    Mental status: The patient is sleepy but rouses to minor verbal stimuli. patient is non verbal nods yes and no, able to follow some commands.    Cranial nerves: Pupils equal and react symmetrically to light. There is no visual field deficit to confrontation. Extraocular motion is full with no nystagmus. There is no ptosis. Facial sensation is intact. Right facial asymmetry  - mild loss of nasolabial fold.  Palate elevates symmetrically. Tongue is midline.    Motor: There is normal bulk.  There is no tremor.  Strength is 4+/5 in the right arm with hypertonia and 4/5 leg. (likely residual from previous stroke)  Strength is 5/5 in the left arm and leg.    Sensation: Intact to light touch and pin in 4 extremities    Reflexes: deferred    Cerebellar: There is no dysmetria on finger to nose testing.    Gait : deferred      LABS:                        11.7   5.18  )-----------( 248      ( 17 Dec 2024 05:52 )             34.1    12-17    140  |  101  |  11.6  ----------------------------<  81  4.1   |  24.0  |  0.46[L]    Ca    9.4      17 Dec 2024 05:52  Phos  2.8     12-17  Mg     1.8     12-17    TPro  6.0[L]  /  Alb  3.4  /  TBili  0.6  /  DBili  x   /  AST  28  /  ALT  41[H]  /  AlkPhos  94  12-17  PT/INR - ( 15 Dec 2024 17:21 )   PT: 12.2 sec;   INR: 1.05 ratio      PTT - ( 15 Dec 2024 17:21 )  PTT:28.9 sec    LDL: 38  A1C: 5.6      IMAGING: Reviewed by me.     NEURO  CT Head No Cont (12.17.24 @ 09:11)  IMPRESSION:  Moderate to severe chronicmicrovascular changes without   evidence of an acute transcortical infarction or hemorrhage.    CT Brain Stroke Protocol (12.15.24 @ 17:33)  IMPRESSION:  No acute hemorrhage.    Stent within an ovoid dense structure with mass effect on the amy   consistent with stented basilar artery aneurysm, not significantly changed.    Loss of gray-white junction in the right lateral cerebellum which may be   larger in size compared to 11/25/2024. Cannot exclude enlarging subacute infarct.    Encephalomalacia and gliosis in the right medial occipital lobe, not significantly changed.    CT Brain Perfusion Maps Stroke (12.15.24 @ 17:44)   IMPRESSION:    CT PERFUSION:  Technical limitations: None.    Core infarction: 0 ml  Penumbra / tissue at risk for active ischemia: 0 ml    CTA NECK:  Poor visualization of the proximal left vertebral artery.   There is extensive injection lateral vessel contamination.  Calcified plaque bilateral proximal ICA with less than 50% stenosis based   on NASCET criteria.    CTA HEAD:  No large vessel occlusion, significant stenosis or vascular abnormality   identified.  Stent in the basilar artery with excluded basilar aneurysm, not   significantly changed.  --  XRAY  Xray Chest 1 View- PORTABLE-Urgent (12.15.24 @ 18:40)   FINDINGS:  Single frontal view of the chest demonstrates the lungs to be clear. The   cardiomediastinal silhouette is unremarkable. No acute osseous   abnormalities. Overlying EKG leads and wires are noted  IMPRESSION: No acute cardiopulmonary disease process.  --  CARDIAC  TTE W or WO Ultrasound Enhancing Agent (11.25.24 @ 16:55)   CONCLUSIONS:   1. Left atrium is normal in size.   2. Mild left ventricular hypertrophy.   3. Left ventricular cavity is normal in size. Left ventricular systolic function is normal with an ejection fraction of 54 % by Dejesus's method of disks. There are no regional wall motion abnormalities seen.   4. There is mild (grade 1) left ventricular diastolic dysfunction.   5. The right atrium is normal in size.   6. Normal right ventricular cavity size, with normal wall thickness, and normal right ventricular systolic function.   7. No significant valvular disease.   8. No pericardial effusion seen.

## 2024-12-17 NOTE — PHYSICAL THERAPY INITIAL EVALUATION ADULT - ADDITIONAL COMMENTS
As per EMR, pt. admitted for CVA from [facility], son found to have left sided  weakness. Patient bedbound, nonverbal and has behavioral disturbances at times. Total care, has a WC, has been staying at [facility].

## 2024-12-17 NOTE — OCCUPATIONAL THERAPY INITIAL EVALUATION ADULT - RANGE OF MOTION EXAMINATION, UPPER EXTREMITY
Right UE passive ROM WFLs; active ROM 1/4 shoulder flexion, 1/2 elbow and digit flexion/Left UE Active ROM was WFL (within functional limits)

## 2024-12-18 LAB
ANION GAP SERPL CALC-SCNC: 11 MMOL/L — SIGNIFICANT CHANGE UP (ref 5–17)
BASOPHILS # BLD AUTO: 0.04 K/UL — SIGNIFICANT CHANGE UP (ref 0–0.2)
BASOPHILS NFR BLD AUTO: 0.8 % — SIGNIFICANT CHANGE UP (ref 0–2)
BUN SERPL-MCNC: 10.7 MG/DL — SIGNIFICANT CHANGE UP (ref 8–20)
CALCIUM SERPL-MCNC: 9.2 MG/DL — SIGNIFICANT CHANGE UP (ref 8.4–10.5)
CHLORIDE SERPL-SCNC: 103 MMOL/L — SIGNIFICANT CHANGE UP (ref 96–108)
CO2 SERPL-SCNC: 27 MMOL/L — SIGNIFICANT CHANGE UP (ref 22–29)
CREAT SERPL-MCNC: 0.53 MG/DL — SIGNIFICANT CHANGE UP (ref 0.5–1.3)
EGFR: 109 ML/MIN/1.73M2 — SIGNIFICANT CHANGE UP
EOSINOPHIL # BLD AUTO: 0.11 K/UL — SIGNIFICANT CHANGE UP (ref 0–0.5)
EOSINOPHIL NFR BLD AUTO: 2.3 % — SIGNIFICANT CHANGE UP (ref 0–6)
GLUCOSE SERPL-MCNC: 98 MG/DL — SIGNIFICANT CHANGE UP (ref 70–99)
HCT VFR BLD CALC: 35.1 % — LOW (ref 39–50)
HGB BLD-MCNC: 11.9 G/DL — LOW (ref 13–17)
IMM GRANULOCYTES NFR BLD AUTO: 0.2 % — SIGNIFICANT CHANGE UP (ref 0–0.9)
LYMPHOCYTES # BLD AUTO: 1.88 K/UL — SIGNIFICANT CHANGE UP (ref 1–3.3)
LYMPHOCYTES # BLD AUTO: 38.9 % — SIGNIFICANT CHANGE UP (ref 13–44)
MAGNESIUM SERPL-MCNC: 1.8 MG/DL — SIGNIFICANT CHANGE UP (ref 1.6–2.6)
MCHC RBC-ENTMCNC: 30.1 PG — SIGNIFICANT CHANGE UP (ref 27–34)
MCHC RBC-ENTMCNC: 33.9 G/DL — SIGNIFICANT CHANGE UP (ref 32–36)
MCV RBC AUTO: 88.6 FL — SIGNIFICANT CHANGE UP (ref 80–100)
MONOCYTES # BLD AUTO: 0.74 K/UL — SIGNIFICANT CHANGE UP (ref 0–0.9)
MONOCYTES NFR BLD AUTO: 15.3 % — HIGH (ref 2–14)
MRSA PCR RESULT.: DETECTED
NEUTROPHILS # BLD AUTO: 2.05 K/UL — SIGNIFICANT CHANGE UP (ref 1.8–7.4)
NEUTROPHILS NFR BLD AUTO: 42.5 % — LOW (ref 43–77)
PHOSPHATE SERPL-MCNC: 3 MG/DL — SIGNIFICANT CHANGE UP (ref 2.4–4.7)
PLATELET # BLD AUTO: 237 K/UL — SIGNIFICANT CHANGE UP (ref 150–400)
POTASSIUM SERPL-MCNC: 3.9 MMOL/L — SIGNIFICANT CHANGE UP (ref 3.5–5.3)
POTASSIUM SERPL-SCNC: 3.9 MMOL/L — SIGNIFICANT CHANGE UP (ref 3.5–5.3)
RBC # BLD: 3.96 M/UL — LOW (ref 4.2–5.8)
RBC # FLD: 14.2 % — SIGNIFICANT CHANGE UP (ref 10.3–14.5)
S AUREUS DNA NOSE QL NAA+PROBE: DETECTED
SODIUM SERPL-SCNC: 141 MMOL/L — SIGNIFICANT CHANGE UP (ref 135–145)
TROPONIN T, HIGH SENSITIVITY RESULT: 52 NG/L — HIGH (ref 0–51)
WBC # BLD: 4.83 K/UL — SIGNIFICANT CHANGE UP (ref 3.8–10.5)
WBC # FLD AUTO: 4.83 K/UL — SIGNIFICANT CHANGE UP (ref 3.8–10.5)

## 2024-12-18 PROCEDURE — 99233 SBSQ HOSP IP/OBS HIGH 50: CPT

## 2024-12-18 PROCEDURE — 99232 SBSQ HOSP IP/OBS MODERATE 35: CPT

## 2024-12-18 RX ORDER — OLANZAPINE 15 MG/1
5 TABLET ORAL ONCE
Refills: 0 | Status: COMPLETED | OUTPATIENT
Start: 2024-12-18 | End: 2024-12-18

## 2024-12-18 RX ORDER — MORPHINE SULFATE 15 MG
1 TABLET, EXTENDED RELEASE ORAL ONCE
Refills: 0 | Status: DISCONTINUED | OUTPATIENT
Start: 2024-12-18 | End: 2024-12-18

## 2024-12-18 RX ORDER — MUPIROCIN 2 %
1 OINTMENT (GRAM) TOPICAL
Refills: 0 | Status: COMPLETED | OUTPATIENT
Start: 2024-12-18 | End: 2024-12-23

## 2024-12-18 RX ORDER — HYDRALAZINE HYDROCHLORIDE 10 MG/1
10 TABLET ORAL EVERY 6 HOURS
Refills: 0 | Status: DISCONTINUED | OUTPATIENT
Start: 2024-12-18 | End: 2024-12-31

## 2024-12-18 RX ORDER — ACETAMINOPHEN 80 MG/.8ML
1000 SOLUTION/ DROPS ORAL ONCE
Refills: 0 | Status: COMPLETED | OUTPATIENT
Start: 2024-12-18 | End: 2024-12-18

## 2024-12-18 RX ORDER — CHLORHEXIDINE GLUCONATE 1.2 MG/ML
1 RINSE ORAL
Refills: 0 | Status: DISCONTINUED | OUTPATIENT
Start: 2024-12-18 | End: 2024-12-31

## 2024-12-18 RX ADMIN — ACETAMINOPHEN 1000 MILLIGRAM(S): 80 SOLUTION/ DROPS ORAL at 15:31

## 2024-12-18 RX ADMIN — ACETAMINOPHEN 400 MILLIGRAM(S): 80 SOLUTION/ DROPS ORAL at 14:04

## 2024-12-18 RX ADMIN — HYDRALAZINE HYDROCHLORIDE 10 MILLIGRAM(S): 10 TABLET ORAL at 09:01

## 2024-12-18 RX ADMIN — HEPARIN SODIUM 5000 UNIT(S): 1000 INJECTION, SOLUTION INTRAVENOUS; SUBCUTANEOUS at 05:17

## 2024-12-18 RX ADMIN — SODIUM CHLORIDE 75 MILLILITER(S): 9 INJECTION, SOLUTION INTRAVENOUS at 17:46

## 2024-12-18 RX ADMIN — Medication 1 MILLIGRAM(S): at 00:27

## 2024-12-18 RX ADMIN — CHLORHEXIDINE GLUCONATE 1 APPLICATION(S): 1.2 RINSE ORAL at 13:02

## 2024-12-18 RX ADMIN — Medication 300 MILLIGRAM(S): at 13:02

## 2024-12-18 RX ADMIN — HEPARIN SODIUM 5000 UNIT(S): 1000 INJECTION, SOLUTION INTRAVENOUS; SUBCUTANEOUS at 17:14

## 2024-12-18 NOTE — PROGRESS NOTE ADULT - ASSESSMENT
Patient is a 69 year old male with a past medical history of a partially thrombosed fusiform basilar artery aneurysm resulting in cerebral dysfunction, right-sided paralysis, and current nonverbal status with behavioral disturbances (treated at [facility] from June 14th to July 17th, 2024) presents from [facility] after a mechanical fall. He had a recent admission was for mildly elevated troponin levels, failure to thrive, and altered mental status suspected to be due to worsening vascular dementia. He presented with reportedly developed new left-sided weakness, which has since improved. He has a right ocular implant (non-MR compatible). Head imaging demonstrates a possible enlarging right cerebellar subacute infarction compared to imaging from November. He was subsequently admitted to the Stroke service for rule out CVA. MRI was unable to be obtained due to ocular implant. Palliative was consulted for assistance with goals of care. SLP evaluated patient with recommendations for NPO. CTH was repeated today which showed "moderate to severe chronic microvascular changes without evidence of an acute transcortical infarction or hemorrhage." EEG is pending to rule out seizure. Patient now being transferred from Neurology to Medicine.    L sided weakness, hx of CVA w/ R sided weakness  - Neurology recs appreciated  - Unable to obtain MRI  - Repeat CTH without any new CVA  - Neuro checks  - Continue ASA  - Start Statin, Brillinta once cleared for PO  - Telemetry    Acute metabolic encephelopathy   - likely in setting of Vascular dementia with behavioral changes with recent FTT  - Palliative following for assistance with goals of care  - SLP following, recommending NPO. Patient was recommended mod thick liquids/pureed on prior admission  - Neurology requesting SLP follow up today  - Start IVFs   - Resume home Seroquel, Sertraline, Mirtazapine, Dronabinol once able to tolerate PO  - EEG pending  - Delirium precautions    Elevated troponin  - appears in no distress  - Trop: 36 -> 40 -> 52 -> 55, continue to trend  - Repeat EKG  - Telemetry  - Troponin was elevated to the 50s on prior admission, he was seen by Cardiology and TTE was unremarkable. Patient was deemed not a candidate for stress testing or invasive procedure. Unlikely NSTEMI per Cardiology    Advance Directives: Full code, Palliative following    DVT ppx: Heparin SQ    Dispo: Pending GOC, PT eval.    Patient is a 69 year old male with a past medical history of a partially thrombosed fusiform basilar artery aneurysm resulting in cerebral dysfunction, right-sided paralysis, and current nonverbal status with behavioral disturbances (treated at [facility] from June 14th to July 17th, 2024) presents from [facility] after a mechanical fall. He had a recent admission was for mildly elevated troponin levels, failure to thrive, and altered mental status suspected to be due to worsening vascular dementia. He presented with reportedly developed new left-sided weakness, which has since improved. He has a right ocular implant (non-MR compatible). Head imaging demonstrates a possible enlarging right cerebellar subacute infarction compared to imaging from November. He was subsequently admitted to the Stroke service for rule out CVA. MRI was unable to be obtained due to ocular implant. Palliative was consulted for assistance with goals of care. SLP evaluated patient with recommendations for NPO. CTH was repeated today which showed "moderate to severe chronic microvascular changes without evidence of an acute transcortical infarction or hemorrhage." EEG is pending to rule out seizure. Patient now being transferred from Neurology to Medicine.    L sided weakness, hx of CVA w/ R sided weakness  - Neurology recs appreciated  - Unable to obtain MRI due to implant  - Repeat CTH without any new CVA  - Neuro checks  - Continue ASA  - Start Statin, Brillinta once cleared for PO  - Telemetry    Acute metabolic encephelopathy   Central dysphagia  - likely in setting of Vascular dementia with behavioral changes with recent FTT  - Palliative following for assistance with goals of care  - SLP following, recommending NPO. REmain NPO  -Patient was recommended mod thick liquids/pureed on prior admission  - Neurology requesting SLP follow up today  - continue IVFs   - Resume home Seroquel, Sertraline, Mirtazapine, Dronabinol once able to tolerate PO  - EEG suggestive of focal and generalized  - Delirium precautions    Elevated troponin  - appears in no distress  - Trop: 36 -> 40 -> 52 -> 55, continue to trend  - Repeat EKG  - Telemetry  - Troponin was elevated to the 50s on prior admission, he was seen by Cardiology and TTE was unremarkable. Patient was deemed not a candidate for stress testing or invasive procedure. Unlikely NSTEMI per Cardiology    Advance Directives: Full code, Palliative following    DVT ppx: Heparin SQ    Dispo: Pending GOC, PT eval.  and decision of artificial nutrition

## 2024-12-18 NOTE — PROGRESS NOTE ADULT - SUBJECTIVE AND OBJECTIVE BOX
[facility] Division of Hospital Medicine  Amanda Britton MD   I'm reachable on [employer] Teams      Patient is a 69y old  Male who presents with a chief complaint of Stroke (16 Dec 2024 09:33)      SUBJECTIVE / OVERNIGHT EVENTS:   Patient was seen and examined during rounds  He remain confused. Only nodding head. Denied any pain  Failed SLP evaluation      12 points ROS negative except above    MEDICATIONS  (STANDING):  aspirin Suppository 300 milliGRAM(s) Rectal daily  chlorhexidine 2% Cloths 1 Application(s) Topical <User Schedule>  dextrose 5% + sodium chloride 0.9%. 1000 milliLiter(s) (75 mL/Hr) IV Continuous <Continuous>  heparin   Injectable 5000 Unit(s) SubCutaneous every 12 hours    MEDICATIONS  (PRN):  hydrALAZINE Injectable 10 milliGRAM(s) IV Push every 6 hours PRN SBP >160  ondansetron Injectable 4 milliGRAM(s) IV Push every 8 hours PRN Nausea and/or Vomiting        I&O's Summary      PHYSICAL EXAM:  Vital Signs Last 24 Hrs  T(C): 36.4 (18 Dec 2024 07:38), Max: 36.7 (17 Dec 2024 21:45)  T(F): 97.6 (18 Dec 2024 07:38), Max: 98.1 (17 Dec 2024 21:45)  HR: 60 (18 Dec 2024 07:38) (52 - 98)  BP: 151/88 (18 Dec 2024 09:50) (128/77 - 186/102)  BP(mean): 106 (17 Dec 2024 20:00) (93 - 106)  RR: 19 (18 Dec 2024 07:38) (16 - 19)  SpO2: 97% (18 Dec 2024 07:38) (97% - 100%)    Parameters below as of 18 Dec 2024 07:38  Patient On (Oxygen Delivery Method): room air        CONSTITUTIONAL: NAD, Not in acute distress  EYES:  EOMI, conjunctiva and sclera clear  ENMT: , neck supple , non-tender  RESPIRATORY: Normal respiratory effort; lungs are clear to auscultation bilaterally  CARDIOVASCULAR: S1S2 present  ABDOMEN: soft. bowel sound present  MUSCLOSKELETAL: ; no clubbing or cyanosis of digits;   PSYCH: non verbal. unpurposeful movements  NEUROLOGY: Rt sided weakness  SKIN: No rashes; no palpable lesions  Extremity: No bilateral edema      LABS:                        11.9   4.83  )-----------( 237      ( 18 Dec 2024 07:37 )             35.1     12-18    141  |  103  |  10.7  ----------------------------<  98  3.9   |  27.0  |  0.53    Ca    9.2      18 Dec 2024 07:37  Phos  3.0     12-18  Mg     1.8     12-18    TPro  6.0[L]  /  Alb  3.4  /  TBili  0.6  /  DBili  x   /  AST  28  /  ALT  41[H]  /  AlkPhos  94  12-17          Urinalysis Basic - ( 18 Dec 2024 07:37 )    Color: x / Appearance: x / SG: x / pH: x  Gluc: 98 mg/dL / Ketone: x  / Bili: x / Urobili: x   Blood: x / Protein: x / Nitrite: x   Leuk Esterase: x / RBC: x / WBC x   Sq Epi: x / Non Sq Epi: x / Bacteria: x        CAPILLARY BLOOD GLUCOSE          Labs reviewed:    RADIOLOGY & ADDITIONAL TESTS:  Imaging Personally Reviewed:  Electrocardiogram Personally Reviewed:

## 2024-12-18 NOTE — PROGRESS NOTE ADULT - CONVERSATION DETAILS
Call placed to patient's son, Kisha,  to discuss overall goals of care.  Goal remains to medically optimize and for patient to return back to rehab facility.  Kisha and his brother Odell speak on behalf of the patient, both are inquiring about having an additional swallow evaluation done for today if not they want to discuss the option of having a NG tube placed and the patient temporarily so he can have some nutrition.  Discussed this with Dr. Britton that the family will be here later. Dr. Britton to meet with family upon arrival. Palliative to follow up tomorrow regarding outcome of their meeting if needed for additional support. I did touch upon potential PEG placement if patient continues to fail swallow evals/work up. Kisha understood but is hopeful it will not succumb to that and his father will be able to return back to/near his baseline swallowing ability.   Reconfirmed Code status: Remains full code

## 2024-12-18 NOTE — PROGRESS NOTE ADULT - SUBJECTIVE AND OBJECTIVE BOX
Palliative Care Followup    Present Symptoms:   Present Symptoms: patient noded no to dyspnea, pain, n/v/d. Patient nodded yes to hunger.  nods yes and no to questions, mouthes some responses, reaches for things he needs     Pain: no            Character-            Duration-            Effect-            Factors-            Frequency-            Location-            Severity-    Pain AD Score:0  [email or web address] (press ctrl + left click to view)    Review of Systems: Reviewed  Unable to obtain due to poor mentation   All others negative    MEDICATIONS  (STANDING):  aspirin Suppository 300 milliGRAM(s) Rectal daily  chlorhexidine 2% Cloths 1 Application(s) Topical <User Schedule>  dextrose 5% + sodium chloride 0.9%. 1000 milliLiter(s) (75 mL/Hr) IV Continuous <Continuous>  heparin   Injectable 5000 Unit(s) SubCutaneous every 12 hours    MEDICATIONS  (PRN):  hydrALAZINE Injectable 10 milliGRAM(s) IV Push every 6 hours PRN SBP >160  ondansetron Injectable 4 milliGRAM(s) IV Push every 8 hours PRN Nausea and/or Vomiting      PHYSICAL EXAM:    Vital Signs Last 24 Hrs  T(C): 36.4 (18 Dec 2024 07:38), Max: 36.7 (17 Dec 2024 21:45)  T(F): 97.6 (18 Dec 2024 07:38), Max: 98.1 (17 Dec 2024 21:45)  HR: 60 (18 Dec 2024 07:38) (52 - 98)  BP: 151/88 (18 Dec 2024 09:50) (128/77 - 186/102)  BP(mean): 106 (17 Dec 2024 20:00) (93 - 106)  RR: 19 (18 Dec 2024 07:38) (16 - 19)  SpO2: 97% (18 Dec 2024 07:38) (97% - 100%)    Parameters below as of 18 Dec 2024 07:38  Patient On (Oxygen Delivery Method): room air      General: alert  nods head in response, moves arms and gestures, aphasia    Karnofsky:  %30    HEENT: dry mouth      Lungs: comfortable     CV: normal      GI:  dysphagia    : incontinent     MSK: weakness     Skin: normal       LABS:                          11.9   4.83  )-----------( 237      ( 18 Dec 2024 07:37 )             35.1     12-18    141  |  103  |  10.7  ----------------------------<  98  3.9   |  27.0  |  0.53    Ca    9.2      18 Dec 2024 07:37  Phos  3.0     12-18  Mg     1.8     12-18    TPro  6.0[L]  /  Alb  3.4  /  TBili  0.6  /  DBili  x   /  AST  28  /  ALT  41[H]  /  AlkPhos  94  12-17      Urinalysis Basic - ( 18 Dec 2024 07:37 )    Color: x / Appearance: x / SG: x / pH: x  Gluc: 98 mg/dL / Ketone: x  / Bili: x / Urobili: x   Blood: x / Protein: x / Nitrite: x   Leuk Esterase: x / RBC: x / WBC x   Sq Epi: x / Non Sq Epi: x / Bacteria: x      I&O's Summary      RADIOLOGY & ADDITIONAL STUDIES:    IMPRESSION:  CT BRAIN 12-15-24    IMPRESSION:  No acute hemorrhage.  Stent within an ovoid dense structure with mass effect on the amy   consistent with stented basilar artery aneurysm, not significantly   changed.  Loss of gray-white junction in the right lateral cerebellum which may be   larger in size compared to 11/25/2024. Cannot exclude enlarging subacute   infarct.  Encephalomalacia and gliosis in the right medial occipital lobe, not   significantly changed.    12.15.24   CT PERFUSION:  Technical limitations: None.    Core infarction: 0 ml  Penumbra / tissue at risk for active ischemia: 0 ml    CTA NECK:  Poor visualization of the proximal left vertebral artery. There is   extensive injection lateral vessel contamination.  Calcified plaque bilateral proximal ICA with less than 50% stenosis based   on NASCET criteria.    CTA HEAD:  No large vessel occlusion, significant stenosis or vascular abnormality   identified.  Stent in the basilar artery with excluded basilar aneurysm, not   significantly changed.    ADVANCE DIRECTIVES/TREATMENT PREFERENCES:  Full Code

## 2024-12-18 NOTE — PROGRESS NOTE ADULT - ASSESSMENT
69 year old male hx of stroke, aneurysm repair recently admitted and discharged with Fall, AMS(deemed from progression of vascular dementia), dysphagia, Elevated troponins now back to [facility] with Left sided weakness which son reports is new. CT done in ED showing possible right lateral cerebellum larger in size as compared to scan on 11/25/24. Admitted for AMS.  Patient seen by palliative care team last admission on 11/29,12/3, and 12/5 at which time GOC no limitation to intervention, FULL CODE, plan was for MAURICIO when medically optimized       Palliative consulted for advanced dementia     Problem/Recommendation 1:AMS/Dementia  Underlying vascular dementia - pt had recent stroke/aneurysm repair at [facility] (June/July 2024) and been declining prior to hospitalization   Noted CT head in ED with  possible right lateral cerebellum larger in size as compared to scan on 11/25/24  Consider trying to avoid/minimize deliriogenic drugs such as benzodiazepines and anticholinergics   Non pharmacologic options for delirium: Frequently orient patient, identify self, maintain consistent staffing and location   Limit stimulation, soft voice, soft lighting, gentle handling  Sleep disturbance support  Provide adequate sensory aides such as hearing aids, glasses, calendar, clock  Soft warm blankets  Bed alarm for safety    Problem/Recommendation 2:Dysphagia  Currently NPO  maintain HOB >/= 90 degrees while feeding (if diet advanced)  small bites ifdiet advanced  aspiration precautions    Problem/Recommendation 3: Debility  Assist in ADLS  Maintain safety, fall, aspiration precautions  Set bed alarm, chair alarm for safety and fall prevention  Turn and Position in bed     Problem/Recommendation 4: Palliative Care Encounter  Met with patient at bedside, patient is awake and alert, still remains aphasic.  He is able to nod yes and no to questions -he nodded no to pain and discomfort but he nodded yes to hunger.  Patient failed swallow eval this morning.  Call placed to patient's son, Kisha,  to discuss overall goals of care.  Goal remains to medically optimize and for patient to return back to rehab facility.  Kisha and his brother Odell speak on behalf of the patient, both are inquiring about having an additional swallow evaluation done for today if not they want to discuss the option of having a NG tube placed and the patient temporarily so he can have some nutrition.  Discussed this with Dr. Britton that the family will be here later. Dr. Britton to meet with family upon arrival. Palliative to follow up tomorrow regarding outcome of their meeting if needed for additional support. I did touch upon potential PEG placement if patient continues to fail swallow evals/work up. Kisha understood but is hopeful it will not succumb to that and his father will be able to return back to/near his baseline swallowing ability. Palliative to follow.  Reconfirmed Code status: Remains full code    50 minutes Total time also includes discussion during interdisciplinary team rounds, chart review including but limited to prior admissions/   review of medications/ labs/ imaging, examination, care coordination with other health care professionals, documentation EXCLUDING advance care planning discussions.     COUNSELING:  Face to face meeting to discuss Advanced Care Planning - Time Spent ______Minutes.      Thank you for the opportunity to assist with the care of this patient.   [facility] Palliative Medicine Consult Service [phone].

## 2024-12-18 NOTE — PROVIDER CONTACT NOTE (OTHER) - SITUATION
Pt has BP of 186/102, asymptomatic. Pt admitting dx of stroke. Med NP informed. RN recomends hydralazine 5 mg IV push.

## 2024-12-19 LAB — TROPONIN T, HIGH SENSITIVITY RESULT: 38 NG/L — SIGNIFICANT CHANGE UP (ref 0–51)

## 2024-12-19 PROCEDURE — 93010 ELECTROCARDIOGRAM REPORT: CPT

## 2024-12-19 PROCEDURE — 99232 SBSQ HOSP IP/OBS MODERATE 35: CPT

## 2024-12-19 PROCEDURE — 99233 SBSQ HOSP IP/OBS HIGH 50: CPT

## 2024-12-19 RX ORDER — LOSARTAN POTASSIUM 100 MG/1
25 TABLET, FILM COATED ORAL DAILY
Refills: 0 | Status: DISCONTINUED | OUTPATIENT
Start: 2024-12-19 | End: 2024-12-31

## 2024-12-19 RX ORDER — MORPHINE SULFATE 15 MG
1 TABLET, EXTENDED RELEASE ORAL ONCE
Refills: 0 | Status: DISCONTINUED | OUTPATIENT
Start: 2024-12-19 | End: 2024-12-19

## 2024-12-19 RX ORDER — ASPIRIN 81 MG
81 TABLET, DELAYED RELEASE (ENTERIC COATED) ORAL DAILY
Refills: 0 | Status: DISCONTINUED | OUTPATIENT
Start: 2024-12-19 | End: 2024-12-31

## 2024-12-19 RX ORDER — DRONABINOL 5 MG/ML
2.5 SOLUTION ORAL
Refills: 0 | Status: DISCONTINUED | OUTPATIENT
Start: 2024-12-19 | End: 2024-12-26

## 2024-12-19 RX ORDER — TICAGRELOR 60 MG/1
90 TABLET ORAL
Refills: 0 | Status: DISCONTINUED | OUTPATIENT
Start: 2024-12-20 | End: 2024-12-31

## 2024-12-19 RX ORDER — METOPROLOL TARTRATE 50 MG
25 TABLET ORAL DAILY
Refills: 0 | Status: DISCONTINUED | OUTPATIENT
Start: 2024-12-19 | End: 2024-12-20

## 2024-12-19 RX ADMIN — HEPARIN SODIUM 5000 UNIT(S): 1000 INJECTION, SOLUTION INTRAVENOUS; SUBCUTANEOUS at 05:17

## 2024-12-19 RX ADMIN — HEPARIN SODIUM 5000 UNIT(S): 1000 INJECTION, SOLUTION INTRAVENOUS; SUBCUTANEOUS at 17:47

## 2024-12-19 RX ADMIN — Medication 1 APPLICATION(S): at 17:48

## 2024-12-19 RX ADMIN — Medication 1 MILLIGRAM(S): at 14:11

## 2024-12-19 RX ADMIN — OLANZAPINE 5 MILLIGRAM(S): 15 TABLET ORAL at 00:35

## 2024-12-19 RX ADMIN — CHLORHEXIDINE GLUCONATE 1 APPLICATION(S): 1.2 RINSE ORAL at 05:17

## 2024-12-19 RX ADMIN — Medication 25 MILLIGRAM(S): at 22:57

## 2024-12-19 RX ADMIN — LOSARTAN POTASSIUM 25 MILLIGRAM(S): 100 TABLET, FILM COATED ORAL at 22:57

## 2024-12-19 RX ADMIN — Medication 81 MILLIGRAM(S): at 17:52

## 2024-12-19 RX ADMIN — Medication 1 MILLIGRAM(S): at 15:15

## 2024-12-19 RX ADMIN — Medication 1 APPLICATION(S): at 05:17

## 2024-12-19 RX ADMIN — SODIUM CHLORIDE 75 MILLILITER(S): 9 INJECTION, SOLUTION INTRAVENOUS at 16:18

## 2024-12-19 NOTE — PROGRESS NOTE ADULT - ASSESSMENT
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 He had a recent admission was for mildly elevated troponin levels, failure to thrive, and altered mental status suspected to be due to worsening vascular dementia. He presented with reportedly developed new left-sided weakness, which has since improved. He has a right ocular implant (non-MR compatible). Head imaging demonstrates a possible enlarging right cerebellar subacute infarction compared to imaging from November. He was subsequently admitted to the Stroke service for rule out CVA. MRI was unable to be obtained due to ocular implant. Palliative was consulted for assistance with goals of care. SLP evaluated patient with recommendations for NPO. CTH was repeated today which showed "moderate to severe chronic microvascular changes without evidence of an acute transcortical infarction or hemorrhage." EEG is pending to rule out seizure. Patient now being transferred from Neurology to Medicine.    #L sided weakness, hx of CVA w/ R sided weakness  - Neurology recs appreciated  - Unable to obtain MRI due to implant  - Repeat CTH without any new CVA  - Neuro checks  - Continue ASA  - Start Statin, Brillinta as cleared for diet  - Telemetry    Acute metabolic encephelopathy   Central dysphagia  - likely in setting of Vascular dementia with behavioral changes with recent FTT  - Palliative following for assistance with goals of care  - SLP following,cleared for pureed diet with no liquid. they will follow to advance diet  -- continue IVFs   - Resume home Seroquel, Sertraline, Mirtazapine, Dronabinol   - EEG suggestive of focal and generalized  - Delirium precautions    Elevated troponin  - appears in no distress  - Trop: 36 -> 40 -> 52 -> 55, continue to trend  - Repeat EKG  - Telemetry  - Troponin was elevated to the 50s on prior admission, he was seen by Cardiology and TTE was unremarkable. Patient was deemed not a candidate for stress testing or invasive procedure. Unlikely NSTEMI per Cardiology    Advance Directives: Full code, Palliative following    DVT ppx: Heparin SQ    Dispo: pending improvement in swallowing and placement as family is requesting new MAURICIO placement.   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 He had a recent admission was for mildly elevated troponin levels, failure to thrive, and altered mental status suspected to be due to worsening vascular dementia. He presented with reportedly developed new left-sided weakness, which has since improved. He has a right ocular implant (non-MR compatible). Head imaging demonstrates a possible enlarging right cerebellar subacute infarction compared to imaging from November. He was subsequently admitted to the Stroke service for rule out CVA. MRI was unable to be obtained due to ocular implant. Palliative was consulted for assistance with goals of care. SLP evaluated patient with recommendations for NPO. CTH was repeated today which showed "moderate to severe chronic microvascular changes without evidence of an acute transcortical infarction or hemorrhage." EEG is pending to rule out seizure. Patient now being transferred from Neurology to Medicine.    #L sided weakness, hx of CVA w/ R sided weakness  - Neurology recs appreciated  - Unable to obtain MRI due to implant  - Repeat CTH without any new CVA  - Neuro checks  - Continue ASA  - Start Statin, Brillinta as cleared for diet  - Telemetry    Acute metabolic encephelopathy   Central dysphagia  - likely in setting of Vascular dementia with behavioral changes with recent FTT  - Palliative following for assistance with goals of care  - SLP following,cleared for pureed diet with no liquid. they will follow to advance diet  -- continue IVFs   - Resume home Dronabinol  -Hold Seroquel, Sertraline, Mirtazapine as mental status waxing and weaning  - EEG suggestive of focal and generalized  - Delirium precautions    Elevated troponin  - appears in no distress  - Trop: 36 -> 40 -> 52 -> 55, continue to trend  - Repeat EKG  - Telemetry  - Troponin was elevated to the 50s on prior admission, he was seen by Cardiology and TTE was unremarkable. Patient was deemed not a candidate for stress testing or invasive procedure. Unlikely NSTEMI per Cardiology    Advance Directives: Full code, Palliative following    DVT ppx: Heparin SQ    Dispo: pending improvement in swallowing and placement as family is requesting new MAURICIO placement.

## 2024-12-19 NOTE — CONSULT NOTE ADULT - TIME BILLING
History, vitals, exam, meds, labs, cardiac images, ECG, and tele were reviewed.  Patient was agreeable and appreciative of the care provided.  Messaged Dr. Britton History, vitals, exam, meds, labs, cardiac images, ECG, and tele were reviewed.  Patient was agreeable and appreciative of the care provided.  Messaged Dr. Britton  Discussed the case with the patient's son who prefers no stress test at this time either

## 2024-12-19 NOTE — CONSULT NOTE ADULT - ASSESSMENT
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 and now nonverbal and w/ behavioral disturbances treated at  [facility] (6/14-7/17 [address]241) ,  a h/o Rt ocular implant (non MR compatible), GERD,  presented to ED after son reportedly found him to have left sided weakness which is reportedly new.   In the ER he was noted to be moving his left side but not his right and as such stroke code was activated.   CT imaging notable for possible enlarging Rt lateral cerebellar subacute infarction (when compared to 11/25).   A cardiac consult was placed due to Chest pain.   Denies headache palpitations, chest pain dizziness, sob, pnd, orthopnea n/v/d/c/abd pain, fever, chills, syncope, near syncope, cramps or any other discomfort.

## 2024-12-19 NOTE — PROGRESS NOTE ADULT - SUBJECTIVE AND OBJECTIVE BOX
[facility] Division of Hospital Medicine  Amanda Britton MD   I'm reachable on [employer] Teams      Patient is a 69y old  Male who presents with a chief complaint of Stroke (16 Dec 2024 09:33)      SUBJECTIVE / OVERNIGHT EVENTS:   Patient was seen and examined during rounds  More alert and interactive today. He was able to tell his name and follow 1 step command  Later reported pain and pointed toward chest    12 points ROS negative except above    MEDICATIONS  (STANDING):  aspirin  chewable 81 milliGRAM(s) Oral daily  chlorhexidine 2% Cloths 1 Application(s) Topical <User Schedule>  dextrose 5% + sodium chloride 0.9%. 1000 milliLiter(s) (75 mL/Hr) IV Continuous <Continuous>  heparin   Injectable 5000 Unit(s) SubCutaneous every 12 hours  mupirocin 2% Nasal 1 Application(s) Both Nostrils two times a day    MEDICATIONS  (PRN):  hydrALAZINE Injectable 10 milliGRAM(s) IV Push every 6 hours PRN SBP >160  ondansetron Injectable 4 milliGRAM(s) IV Push every 8 hours PRN Nausea and/or Vomiting        I&O's Summary    18 Dec 2024 07:01  -  19 Dec 2024 07:00  --------------------------------------------------------  IN: 0 mL / OUT: 1200 mL / NET: -1200 mL        PHYSICAL EXAM:  Vital Signs Last 24 Hrs  T(C): 36.4 (19 Dec 2024 15:49), Max: 36.6 (18 Dec 2024 20:52)  T(F): 97.5 (19 Dec 2024 15:49), Max: 97.9 (18 Dec 2024 20:52)  HR: 78 (19 Dec 2024 15:49) (59 - 120)  BP: 131/88 (19 Dec 2024 15:49) (130/71 - 168/89)  BP(mean): --  RR: 18 (19 Dec 2024 15:49) (18 - 18)  SpO2: 96% (19 Dec 2024 15:49) (95% - 99%)    Parameters below as of 19 Dec 2024 15:49  Patient On (Oxygen Delivery Method): room air        CONSTITUTIONAL: NAD, Not in acute distress  EYES:  EOMI, conjunctiva and sclera clear  ENMT: , neck supple , non-tender  RESPIRATORY: Normal respiratory effort; lungs are clear to auscultation bilaterally  CARDIOVASCULAR: S1S2 present  ABDOMEN: soft. bowel sound present  MUSCLOSKELETAL: ; no clubbing or cyanosis of digits;   PSYCH: Alert, Oriented X 0, RIght sided strength o/5 and limited evaluation on left side. Incomprehensible speech  NEUROLOGY: CN, motor and sensory intact   SKIN: No rashes; no palpable lesions  Extremity: No bilateral edema      LABS:                        11.9   4.83  )-----------( 237      ( 18 Dec 2024 07:37 )             35.1     12-18    141  |  103  |  10.7  ----------------------------<  98  3.9   |  27.0  |  0.53    Ca    9.2      18 Dec 2024 07:37  Phos  3.0     12-18  Mg     1.8     12-18            Urinalysis Basic - ( 18 Dec 2024 07:37 )    Color: x / Appearance: x / SG: x / pH: x  Gluc: 98 mg/dL / Ketone: x  / Bili: x / Urobili: x   Blood: x / Protein: x / Nitrite: x   Leuk Esterase: x / RBC: x / WBC x   Sq Epi: x / Non Sq Epi: x / Bacteria: x        CAPILLARY BLOOD GLUCOSE          Labs reviewed:    RADIOLOGY & ADDITIONAL TESTS:  Imaging Personally Reviewed:  Electrocardiogram Personally Reviewed:

## 2024-12-19 NOTE — CONSULT NOTE ADULT - PROBLEM SELECTOR RECOMMENDATION 9
Presents with new weakness and  CT imaging notable for possible enlarging Rt lateral cerebellar subacute infarction (when compared to 11/25).  Cardiac consult was placed due to chest pain and EKG change  EKG most likely due to neuro insult  Trops which have improved since last admission  Patient denies chest pain and Sob, no concern for ACE   TTE with EF 54 %, and grade I DD from 11/25/24  Continue to trend trops x 1  Continue Telemetry  If next trop positive will get Echo to assess wall motion abnormality.

## 2024-12-19 NOTE — CONSULT NOTE ADULT - NS ATTEND AMEND GEN_ALL_CORE FT
69y old male with CVA/partially thrombosed  fusiform basilar artery aneurysm w/ subsequent cerebral dysfunction and rt sided paralysis and now nonverbal and w/ behavioral disturbances treated at  [facility] (6/14-7/17 [address]) ,  h/o Rt ocular implant (non MR compatible), GERD,  presented to ED after son reportedly found him to have left sided weakness which is reportedly new.       In the ER, stroke code was activated. CT imaging notable for possible enlarging Rt lateral cerebellar subacute infarction (when compared to 11/25).  A cardiac consult was placed due to chest pain.      -Patient is not a candidate for any stress given his overall medical condition and he is best served with continued medical therapy  -He has known chronic T wave inversion in the anterolateral leads.   -Trop 51->38. No concern for ACS at this time.  -TTE with EF 54 %, and grade I DD from 11/25/24.  -Continue tele.  -We will sign off. 69y old male with CVA/partially thrombosed  fusiform basilar artery aneurysm w/ subsequent cerebral dysfunction and rt sided paralysis and now nonverbal and w/ behavioral disturbances treated at  [facility] (6/14-7/17 [address]) ,  h/o Rt ocular implant (non MR compatible), GERD,  presented to ED after son reportedly found him to have left sided weakness which is reportedly new.       In the ER, stroke code was activated. CT imaging notable for possible enlarging Rt lateral cerebellar subacute infarction (when compared to 11/25).  A cardiac consult was placed due to chest pain.      -Patient is not a candidate for any stress test given his overall medical condition and he is best served with continued medical therapy  -He has known chronic T wave inversion in the anterolateral leads.   -Trop 51->38. No concern for ACS at this time.  -TTE with EF 54 %, and grade I DD from 11/25/24.  -Continue tele.  -We will sign off.

## 2024-12-19 NOTE — CHART NOTE - NSCHARTNOTEFT_GEN_A_CORE
Notified by RN, patient complaining of chest pain, patient is nonverbal. He points to chest and indicates pain. MD notified. Stat EKG and troponin ordered. Notified by RN, patient complaining of chest pain, patient is nonverbal. He points to chest and indicates pain. MD notified. Stat EKG and troponin ordered. EKG showing sinus tach, unchanged from prior. Stat Morphine 1 mg IvP ordered.     hr 124  131/76 Notified by RN, patient complaining of chest pain, patient is nonverbal. He points to chest and indicates pain. MD notified. Stat EKG and troponin ordered. BP: 136/71,   Patient becoming more agitated.  EKG showing sinus tach, unchanged from prior. Stat Morphine 1 mg IVP ordered.              PHYSICAL EXAM:  Constitutional: resting in bed  Cardiac: +S1/S2;      s/p Morphine    Patient became calm, hr decreasing to 84. Patient states pain has improved.

## 2024-12-19 NOTE — CONSULT NOTE ADULT - SUBJECTIVE AND OBJECTIVE BOX
[facility]                                              CARDIOLOGY AT [address], [place] [address]                                             Telephone: [phone]. Fax:[phone]                                                       CARDIOLOGY CONSULTATION NOTE                                                                                             History obtained by: Patient and medical record  Community Cardiologist:   U   obtained: Yes [  ] No [  ]  Reason for Consultation:   Chest pain  Available out pt records reviewed: Yes [  ] No [  ]  NA    Chief complaint:    Patient is a 69y old  Male who presents with a chief complaint of Stroke.        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   In the ER he was noted to be moving his left side but not his right and as such stroke code was activated.   CT imaging notable for possible enlarging Rt lateral cerebellar subacute infarction (when compared to 11/25).   A cardiac consult was placed due to Chest pain.   Denies headache palpitations, chest pain dizziness, sob, pnd, orthopnea n/v/d/c/abd pain, fever, chills, syncope, near syncope, cramps or any other discomfort.          CARDIAC TESTING   ECHO:  CONCLUSIONS:    1. Left atrium is normal in size.   2. Mild left ventricular hypertrophy.   3. Left ventricular cavity is normal in size. Left ventricular systolic function is normal with an ejection fraction of 54 % by Dejesus's method of disks. There are no regional wall motion abnormalities seen.   4. There is mild (grade 1) left ventricular diastolic dysfunction.   5. The right atrium is normal in size.   6. Normal right ventricular cavity size, with normal wall thickness, and normal right ventricular systolic function.   7. No significant valvular disease.   8. No pericardial effusion seen.  ________________________________________________________________________________________  FINDINGS:    STRESS:    CATH:     ELECTROPHYSIOLOGY:     PAST MEDICAL HISTORY  CVA (cerebrovascular accident)  HTN (hypertension)  H/O aneurysm      PAST SURGICAL HISTORY  S/P cerebral aneurysm repair    SOCIAL HISTORY:  Denies smoking/alcohol/drugs  CIGARETTES:     ALCOHOL:  DRUGS:    FAMILY HISTORY:  Family History of Cardiovascular Disease:  Yes [  ] No [x  ]  Coronary Artery Disease in first degree relative: Yes [  ] No [x  ]  Sudden Cardiac Death in First degree relative: Yes [  ] No [ x ]    HOME MEDICATIONS:  acetaminophen 325 mg oral tablet: 2 tab(s) orally every 6 hours As needed Temp greater or equal to 38C (100.4F), Mild Pain (1 - 3) (05 Dec 2024 20:16)  aluminum hydroxide-magnesium hydroxide 200 mg-200 mg/5 mL oral suspension: 30 milliliter(s) orally every 4 hours As needed Dyspepsia (05 Dec 2024 20:16)  aspirin 81 mg oral capsule: 1 cap(s) orally once a day (24 Nov 2024 18:11)  baclofen 5 mg oral tablet: 1 tab(s) orally 2 times a day (25 Nov 2024 12:32)  Brilinta (ticagrelor) 90 mg oral tablet: 1 tab(s) orally 2 times a day (24 Nov 2024 18:11)  Lipitor 40 mg oral tablet: 1 tab(s) orally once a day (at bedtime) (25 Nov 2024 12:32)  losartan 25 mg oral tablet: 1 tab(s) orally once a day (24 Nov 2024 18:11)  Marinol 2.5 mg oral capsule: 1 cap(s) orally 2 times a day (05 Dec 2024 20:16)  melatonin 3 mg oral tablet: 1 tab(s) orally once a day (at bedtime) As needed Insomnia (05 Dec 2024 20:16)  metoprolol succinate 25 mg oral tablet, extended release: 1 tab(s) orally once a day (05 Dec 2024 20:16)  mirtazapine 7.5 mg oral tablet: 1 tab(s) orally once a day (05 Dec 2024 20:16)  pantoprazole 40 mg oral delayed release tablet: 1 tab(s) orally once a day (24 Nov 2024 18:11)  Pepcid 20 mg oral tablet: 1 tab(s) orally 2 times a day (25 Nov 2024 12:32)  QUEtiapine 25 mg oral tablet: 1 tab(s) orally once a day (at bedtime) (10 Dec 2024 12:17)  sertraline 50 mg oral tablet: 1 tab(s) orally once a day (05 Dec 2024 20:16)    CURRENT CARDIAC MEDICATIONS:  hydrALAZINE Injectable 10 milliGRAM(s) IV Push every 6 hours PRN SBP >160  losartan 25 milliGRAM(s) Oral daily  metoprolol succinate ER 25 milliGRAM(s) Oral daily    CURRENT OTHER MEDICATIONS:  dronabinol 2.5 milliGRAM(s) Oral two times a day  ondansetron Injectable 4 milliGRAM(s) IV Push every 8 hours PRN Nausea and/or Vomiting  aspirin  chewable 81 milliGRAM(s) Oral daily  chlorhexidine 2% Cloths 1 Application(s) Topical <User Schedule>  dextrose 5% + sodium chloride 0.9%. 1000 milliLiter(s) (75 mL/Hr) IV Continuous <Continuous>  heparin   Injectable 5000 Unit(s) SubCutaneous every 12 hours  mupirocin 2% Nasal 1 Application(s) Both Nostrils two times a day, Stop order after: 5 Days    ALLERGIES:   No Known Allergies    REVIEW OF SYMPTOMS:   CONSTITUTIONAL: No fever, no chills, no weight loss, no weight gain, no fatigue   ENMT:  No vertigo; No sinus or throat pain  NECK: No pain or stiffness  CARDIOVASCULAR: No chest pain, no dyspnea, no syncope/presyncope, no palpitations, no dizziness, no Orthopnea, no Paroxsymal nocturnal dyspnea  RESPIRATORY: no Shortness of breath, no cough, no wheezing  : No dysuria, no hematuria   GI: No dark color stool, no nausea, no diarrhea, no constipation, no abdominal pain   NEURO: No headache, no slurred speech   MUSCULOSKELETAL: No joint pain or swelling; No muscle, back, or extremity pain  PSYCH: No agitation, no anxiety.    ALL OTHER REVIEW OF SYSTEMS ARE NEGATIVE.    VITAL SIGNS:  T(C): 36.4 (12-19-24 @ 15:49), Max: 36.6 (12-18-24 @ 20:52)  T(F): 97.5 (12-19-24 @ 15:49), Max: 97.9 (12-18-24 @ 20:52)  HR: 78 (12-19-24 @ 15:49) (59 - 120)  BP: 131/88 (12-19-24 @ 15:49) (130/71 - 168/89)  RR: 18 (12-19-24 @ 15:49) (18 - 18)  SpO2: 96% (12-19-24 @ 15:49) (95% - 99%)    INTAKE AND OUTPUT:   12-18 @ 07:01  -  12-19 @ 07:00  --------------------------------------------------------  IN: 0 mL / OUT: 1200 mL / NET: -1200 mL    PHYSICAL EXAM:  Constitutional: Comfortable . No acute distress.   HEENT: Atraumatic and normocephalic , neck is supple . no JVD. No carotid bruit.  CNS: A&Ox2, Speech garbles.  Hemiparesis on the right.  .   Respiratory: CTAB, unlabored   Cardiovascular: RRR normal s1 s2. No murmur. No rubs or gallop.  Gastrointestinal: Soft, non-tender. +Bowel sounds.   Extremities: 2+ Peripheral Pulses, No clubbing, cyanosis, or edema  Psychiatric: Calm . no agitation.   Skin: Warm and dry, no ulcers on extremities   LABS:                         11.9   4.83  )-----------( 237      ( 18 Dec 2024 07:37 )             35.1     12-18    141  |  103  |  10.7  ----------------------------<  98  3.9   |  27.0  |  0.53    Ca    9.2      18 Dec 2024 07:37  Phos  3.0     12-18  Mg     1.8     12-18  Urinalysis Basic - ( 18 Dec 2024 07:37 )    Color: x / Appearance: x / SG: x / pH: x  Gluc: 98 mg/dL / Ketone: x  / Bili: x / Urobili: x   Blood: x / Protein: x / Nitrite: x   Leuk Esterase: x / RBC: x / WBC x   Sq Epi: x / Non Sq Epi: x / Bacteria: x    INTERPRETATION OF TELEMETRY:  SB to ST 40's - 130.  Underlying SR with PAC"s, and 130's SVT.      ECG:    Sr, ST depressions, TWI, V4-V6  Prior ECG: Yes [ x ] No [  ]                                                  [facility]                                              CARDIOLOGY AT [address], [place] [address]                                             Telephone: [phone]. Fax:[phone]                                                       CARDIOLOGY CONSULTATION NOTE                                                                                             History obtained by: Patient and medical record  Community Cardiologist:   U   obtained: Yes [  ] No [  ]  Reason for Consultation:   Chest pain  Available out pt records reviewed: Yes [  ] No [  ]  NA    Chief complaint:    Patient is a 69y old  Male who presents with a chief complaint of Stroke.        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   In the ER he was noted to be moving his left side but not his right and as such stroke code was activated.   CT imaging notable for possible enlarging Rt lateral cerebellar subacute infarction (when compared to 11/25).   A cardiac consult was placed due to Chest pain.   Denies headache palpitations, chest pain dizziness, sob, pnd, orthopnea n/v/d/c/abd pain, fever, chills, syncope, near syncope, cramps or any other discomfort.          CARDIAC TESTING   ECHO:  CONCLUSIONS:    1. Left atrium is normal in size.   2. Mild left ventricular hypertrophy.   3. Left ventricular cavity is normal in size. Left ventricular systolic function is normal with an ejection fraction of 54 % by Dejesus's method of disks. There are no regional wall motion abnormalities seen.   4. There is mild (grade 1) left ventricular diastolic dysfunction.   5. The right atrium is normal in size.   6. Normal right ventricular cavity size, with normal wall thickness, and normal right ventricular systolic function.   7. No significant valvular disease.   8. No pericardial effusion seen.  ________________________________________________________________________________________  FINDINGS:    STRESS:    CATH:     ELECTROPHYSIOLOGY:     PAST MEDICAL HISTORY  CVA (cerebrovascular accident)  HTN (hypertension)  H/O aneurysm      PAST SURGICAL HISTORY  S/P cerebral aneurysm repair    SOCIAL HISTORY:  Denies smoking/alcohol/drugs  CIGARETTES:     ALCOHOL:  DRUGS:    FAMILY HISTORY:  Family History of Cardiovascular Disease:  Yes [  ] No [x  ]  Coronary Artery Disease in first degree relative: Yes [  ] No [x  ]  Sudden Cardiac Death in First degree relative: Yes [  ] No [ x ]    HOME MEDICATIONS:  acetaminophen 325 mg oral tablet: 2 tab(s) orally every 6 hours As needed Temp greater or equal to 38C (100.4F), Mild Pain (1 - 3) (05 Dec 2024 20:16)  aluminum hydroxide-magnesium hydroxide 200 mg-200 mg/5 mL oral suspension: 30 milliliter(s) orally every 4 hours As needed Dyspepsia (05 Dec 2024 20:16)  aspirin 81 mg oral capsule: 1 cap(s) orally once a day (24 Nov 2024 18:11)  baclofen 5 mg oral tablet: 1 tab(s) orally 2 times a day (25 Nov 2024 12:32)  Brilinta (ticagrelor) 90 mg oral tablet: 1 tab(s) orally 2 times a day (24 Nov 2024 18:11)  Lipitor 40 mg oral tablet: 1 tab(s) orally once a day (at bedtime) (25 Nov 2024 12:32)  losartan 25 mg oral tablet: 1 tab(s) orally once a day (24 Nov 2024 18:11)  Marinol 2.5 mg oral capsule: 1 cap(s) orally 2 times a day (05 Dec 2024 20:16)  melatonin 3 mg oral tablet: 1 tab(s) orally once a day (at bedtime) As needed Insomnia (05 Dec 2024 20:16)  metoprolol succinate 25 mg oral tablet, extended release: 1 tab(s) orally once a day (05 Dec 2024 20:16)  mirtazapine 7.5 mg oral tablet: 1 tab(s) orally once a day (05 Dec 2024 20:16)  pantoprazole 40 mg oral delayed release tablet: 1 tab(s) orally once a day (24 Nov 2024 18:11)  Pepcid 20 mg oral tablet: 1 tab(s) orally 2 times a day (25 Nov 2024 12:32)  QUEtiapine 25 mg oral tablet: 1 tab(s) orally once a day (at bedtime) (10 Dec 2024 12:17)  sertraline 50 mg oral tablet: 1 tab(s) orally once a day (05 Dec 2024 20:16)    CURRENT CARDIAC MEDICATIONS:  hydrALAZINE Injectable 10 milliGRAM(s) IV Push every 6 hours PRN SBP >160  losartan 25 milliGRAM(s) Oral daily  metoprolol succinate ER 25 milliGRAM(s) Oral daily    CURRENT OTHER MEDICATIONS:  dronabinol 2.5 milliGRAM(s) Oral two times a day  ondansetron Injectable 4 milliGRAM(s) IV Push every 8 hours PRN Nausea and/or Vomiting  aspirin  chewable 81 milliGRAM(s) Oral daily  chlorhexidine 2% Cloths 1 Application(s) Topical <User Schedule>  dextrose 5% + sodium chloride 0.9%. 1000 milliLiter(s) (75 mL/Hr) IV Continuous <Continuous>  heparin   Injectable 5000 Unit(s) SubCutaneous every 12 hours  mupirocin 2% Nasal 1 Application(s) Both Nostrils two times a day, Stop order after: 5 Days    ALLERGIES:   No Known Allergies    REVIEW OF SYMPTOMS:   CONSTITUTIONAL: No fever, no chills, no weight loss, no weight gain, no fatigue   ENMT:  No vertigo; No sinus or throat pain  NECK: No pain or stiffness  CARDIOVASCULAR: No chest pain, no dyspnea, no syncope/presyncope, no palpitations, no dizziness, no Orthopnea, no Paroxsymal nocturnal dyspnea  RESPIRATORY: no Shortness of breath, no cough, no wheezing  : No dysuria, no hematuria   GI: No dark color stool, no nausea, no diarrhea, no constipation, no abdominal pain   NEURO: No headache, no slurred speech   MUSCULOSKELETAL: No joint pain or swelling; No muscle, back, or extremity pain  PSYCH: No agitation, no anxiety.    ALL OTHER REVIEW OF SYSTEMS ARE NEGATIVE.    VITAL SIGNS:  T(C): 36.4 (12-19-24 @ 15:49), Max: 36.6 (12-18-24 @ 20:52)  T(F): 97.5 (12-19-24 @ 15:49), Max: 97.9 (12-18-24 @ 20:52)  HR: 78 (12-19-24 @ 15:49) (59 - 120)  BP: 131/88 (12-19-24 @ 15:49) (130/71 - 168/89)  RR: 18 (12-19-24 @ 15:49) (18 - 18)  SpO2: 96% (12-19-24 @ 15:49) (95% - 99%)    INTAKE AND OUTPUT:   12-18 @ 07:01  -  12-19 @ 07:00  --------------------------------------------------------  IN: 0 mL / OUT: 1200 mL / NET: -1200 mL    PHYSICAL EXAM:  Constitutional: Comfortable . No acute distress.   HEENT: Atraumatic and normocephalic , neck is supple . no JVD. No carotid bruit.  CNS: A&Ox2, Speech garbles.  Hemiparesis on the right.  .   Respiratory: CTAB, unlabored   Cardiovascular: RRR normal s1 s2. No murmur. No rubs or gallop.  Gastrointestinal: Soft, non-tender. +Bowel sounds.   Extremities: 2+ Peripheral Pulses, No clubbing, cyanosis, or edema  Psychiatric: Calm . no agitation.   Skin: Warm and dry, no ulcers on extremities   LABS:                         11.9   4.83  )-----------( 237      ( 18 Dec 2024 07:37 )             35.1     12-18    141  |  103  |  10.7  ----------------------------<  98  3.9   |  27.0  |  0.53    Ca    9.2      18 Dec 2024 07:37  Phos  3.0     12-18  Mg     1.8     12-18  Urinalysis Basic - ( 18 Dec 2024 07:37 )    Color: x / Appearance: x / SG: x / pH: x  Gluc: 98 mg/dL / Ketone: x  / Bili: x / Urobili: x   Blood: x / Protein: x / Nitrite: x   Leuk Esterase: x / RBC: x / WBC x   Sq Epi: x / Non Sq Epi: x / Bacteria: x    INTERPRETATION OF TELEMETRY:  SB to ST 40's - 130.  Underlying SR with PAC"s, and 130's SVT.      ECG: Sr, ST depressions, TWI, V4-V6, poor quality ECG  Prior ECG: Yes [ x ] No [  ]

## 2024-12-19 NOTE — PROGRESS NOTE ADULT - ASSESSMENT
68 yo male with a history of CVA, right-sided paralysis, aneurysm repair, dysphagia, with recent admission for a fall, w/course notable for mildly elevated troponin levels, failure to thrive, and altered mental status suspected to be due to worsening vascular dementia, presents to [facility] for new left-sided weakness.  CT demonstrates a possible enlarging right cerebellar subacute infarction compared to imaging from November (MRI not feasible due to cochlear implant).  Palliative care consulted for goals of care in the setting of dementia.    # dementia: vascular dementia  - imaging noted  - CVA in June 2024, with protracted admission at [facility]  - at high risk for delirium; exercise delirium precautions    # dysphagia:  - cleared for diet: pureed; however, no liquids at this time  - exercise aspiration precautions when feeding: keep HOB elevated, feed when awake and alert.    # debility  - supportive care, assist as needed.    # palliative care encounter  - full code orders  - no present limits on care  - seen by palliative care over multiple admissions: no limits placed on care.  Per most recent GOC discussion with NP Chin: sons express desire for medical optimization and return to [facility].  - Will follow for course, albeit on a non-daily basis, as patient is anticipated to need to be able to tolerate some degree of liquids to go back to [facility].  Please contact palliative care if assistance is needed more immediately.    Time Statement    Total Time Spent__35__ minutes    This includes chart review, patient assessment, discussion and collaboration with interdisciplinary team members.

## 2024-12-19 NOTE — CHART NOTE - NSCHARTNOTEFT_GEN_A_CORE
Patient had chest pain in afternoon. Troponin negative. EKG unchanged from admission. He was tachycardic. Improved after IV morphine    Later family requested to stop narcotics    Personally called patient's son, he requested to only use seroquel for agitation. I explained NSAIDS are not approved for chest pain. As per son, patient's is not reliable and he cannot express that he is having chest pain. So, we should not consider his chest pain complain. I explained patient responded well and it will not be ideal to leave patient in pain despite confusion.     After discussion, he prefer not to use narcotics for pain medication. We will relay this to night team.

## 2024-12-19 NOTE — PROGRESS NOTE ADULT - SUBJECTIVE AND OBJECTIVE BOX
Palliative care follow up:    OVERNIGHT EVENTS:     CC:     Present Symptoms:   Dyspnea: 0 1 2 3   Nausea/Vomiting: Yes No  Anxiety:  Yes No  Depression: Yes No  Fatigue: Yes No  Loss of appetite: Yes No  Constipation: Yes No    Pain:             Character-            Duration-            Effect-            Factors-            Frequency-            Location-            Severity-    Review of Systems: Reviewed  Per HPI all other ROS negative  Unable to obtain due to poor mentation     MEDICATIONS  (STANDING):  aspirin  chewable 81 milliGRAM(s) Oral daily  chlorhexidine 2% Cloths 1 Application(s) Topical <User Schedule>  dextrose 5% + sodium chloride 0.9%. 1000 milliLiter(s) (75 mL/Hr) IV Continuous <Continuous>  heparin   Injectable 5000 Unit(s) SubCutaneous every 12 hours  mupirocin 2% Nasal 1 Application(s) Both Nostrils two times a day    MEDICATIONS  (PRN):  hydrALAZINE Injectable 10 milliGRAM(s) IV Push every 6 hours PRN SBP >160  ondansetron Injectable 4 milliGRAM(s) IV Push every 8 hours PRN Nausea and/or Vomiting      PHYSICAL EXAM:      Vital Signs Last 24 Hrs  T(C): 36.4 (19 Dec 2024 15:49), Max: 36.6 (18 Dec 2024 20:52)  T(F): 97.5 (19 Dec 2024 15:49), Max: 97.9 (18 Dec 2024 20:52)  HR: 78 (19 Dec 2024 15:49) (59 - 120)  BP: 131/88 (19 Dec 2024 15:49) (130/71 - 168/89)  BP(mean): --  RR: 18 (19 Dec 2024 15:49) (18 - 18)  SpO2: 96% (19 Dec 2024 15:49) (95% - 99%)    Parameters below as of 19 Dec 2024 15:49  Patient On (Oxygen Delivery Method): room air        General: alert  oriented x ____ lethargic agitated                  cachexia  nonverbal  coma    Karnofsky:  %    HEENT: normal  dry mouth  ET tube/trach    Lungs: comfortable tachypnea/labored breathing  excessive secretions    CV: normal  tachycardia    GI: normal  distended  tender  no BS               PEG/NG/OG tube  constipation  last BM:     : normal  incontinent  oliguria/anuria  salmeron    MSK: normal  weakness  edema             ambulatory  bedbound/wheelchair bound    Skin: normal  pressure ulcers- Stage_____  no rash    LABS:                          11.9   4.83  )-----------( 237      ( 18 Dec 2024 07:37 )             35.1     12-18    141  |  103  |  10.7  ----------------------------<  98  3.9   |  27.0  |  0.53    Ca    9.2      18 Dec 2024 07:37  Phos  3.0     12-18  Mg     1.8     12-18        Urinalysis Basic - ( 18 Dec 2024 07:37 )    Color: x / Appearance: x / SG: x / pH: x  Gluc: 98 mg/dL / Ketone: x  / Bili: x / Urobili: x   Blood: x / Protein: x / Nitrite: x   Leuk Esterase: x / RBC: x / WBC x   Sq Epi: x / Non Sq Epi: x / Bacteria: x      I&O's Summary    18 Dec 2024 07:01  -  19 Dec 2024 07:00  --------------------------------------------------------  IN: 0 mL / OUT: 1200 mL / NET: -1200 mL        RADIOLOGY & ADDITIONAL STUDIES:    ADVANCE DIRECTIVES:   DNR YES NO  Completed on:                     MOLST  YES NO   Completed on:  Living Will  YES NO   Completed on:       Palliative care follow up:    OVERNIGHT EVENTS:     Patient cleared for PO diet (pureed), however, not cleared for liquid.      Patient seen and examined at bedside.  Limited ROS 2/2 mental status.      Patient able to provide name, state the name of his children: Broderick.    Present Symptoms:   Dyspnea: denies  Nausea/Vomiting: denies  Anxiety: unable to obtain  Depression: unable to obtain  Fatigue: unable to obtain  Loss of appetite: unable to obtain  Constipation: denies    Pain: denies    Review of Systems: Reviewed, limited to above      MEDICATIONS  (STANDING):  aspirin  chewable 81 milliGRAM(s) Oral daily  chlorhexidine 2% Cloths 1 Application(s) Topical <User Schedule>  dextrose 5% + sodium chloride 0.9%. 1000 milliLiter(s) (75 mL/Hr) IV Continuous <Continuous>  heparin   Injectable 5000 Unit(s) SubCutaneous every 12 hours  mupirocin 2% Nasal 1 Application(s) Both Nostrils two times a day    MEDICATIONS  (PRN):  hydrALAZINE Injectable 10 milliGRAM(s) IV Push every 6 hours PRN SBP >160  ondansetron Injectable 4 milliGRAM(s) IV Push every 8 hours PRN Nausea and/or Vomiting      PHYSICAL EXAM:      Vital Signs Last 24 Hrs  T(C): 36.4 (19 Dec 2024 15:49), Max: 36.6 (18 Dec 2024 20:52)  T(F): 97.5 (19 Dec 2024 15:49), Max: 97.9 (18 Dec 2024 20:52)  HR: 78 (19 Dec 2024 15:49) (59 - 120)  BP: 131/88 (19 Dec 2024 15:49) (130/71 - 168/89)  BP(mean): --  RR: 18 (19 Dec 2024 15:49) (18 - 18)  SpO2: 96% (19 Dec 2024 15:49) (95% - 99%)    Parameters below as of 19 Dec 2024 15:49  Patient On (Oxygen Delivery Method): room air    Karnofsky: 30 %  Gen: In NAD.  No pain behaviors or work of breathing noted  Neuro: makes few, simple needs known, dysarthric, voluntary, purposeful movement of LUE, LLE  Head: NC/AT  Eyes: sclerae non-icteric  ENT: no lip ulcerations  Resp: unlabored  CV: S1, S2  Abd: soft,  + bowels sounds  Peripheral Vasc: warm  Int: warm and dry  Psych: no psychomotor agitation    LABS:                          11.9   4.83  )-----------( 237      ( 18 Dec 2024 07:37 )             35.1     12-18    141  |  103  |  10.7  ----------------------------<  98  3.9   |  27.0  |  0.53    Ca    9.2      18 Dec 2024 07:37  Phos  3.0     12-18  Mg     1.8     12-18        Urinalysis Basic - ( 18 Dec 2024 07:37 )    Color: x / Appearance: x / SG: x / pH: x  Gluc: 98 mg/dL / Ketone: x  / Bili: x / Urobili: x   Blood: x / Protein: x / Nitrite: x   Leuk Esterase: x / RBC: x / WBC x   Sq Epi: x / Non Sq Epi: x / Bacteria: x      I&O's Summary    18 Dec 2024 07:01  -  19 Dec 2024 07:00  --------------------------------------------------------  IN: 0 mL / OUT: 1200 mL / NET: -1200 mL        RADIOLOGY & ADDITIONAL STUDIES:    ADVANCE DIRECTIVES:   full code orders  sons, surrogates

## 2024-12-20 LAB
ALBUMIN SERPL ELPH-MCNC: 3.4 G/DL — SIGNIFICANT CHANGE UP (ref 3.3–5.2)
ALP SERPL-CCNC: 85 U/L — SIGNIFICANT CHANGE UP (ref 40–120)
ALT FLD-CCNC: 25 U/L — SIGNIFICANT CHANGE UP
ANION GAP SERPL CALC-SCNC: 11 MMOL/L — SIGNIFICANT CHANGE UP (ref 5–17)
AST SERPL-CCNC: 19 U/L — SIGNIFICANT CHANGE UP
BASE EXCESS BLDA CALC-SCNC: 1.4 MMOL/L — SIGNIFICANT CHANGE UP (ref -2–3)
BILIRUB SERPL-MCNC: 0.5 MG/DL — SIGNIFICANT CHANGE UP (ref 0.4–2)
BLOOD GAS COMMENTS ARTERIAL: SIGNIFICANT CHANGE UP
BUN SERPL-MCNC: 10.4 MG/DL — SIGNIFICANT CHANGE UP (ref 8–20)
CALCIUM SERPL-MCNC: 8.7 MG/DL — SIGNIFICANT CHANGE UP (ref 8.4–10.5)
CHLORIDE SERPL-SCNC: 105 MMOL/L — SIGNIFICANT CHANGE UP (ref 96–108)
CO2 SERPL-SCNC: 24 MMOL/L — SIGNIFICANT CHANGE UP (ref 22–29)
CREAT SERPL-MCNC: 0.56 MG/DL — SIGNIFICANT CHANGE UP (ref 0.5–1.3)
EGFR: 107 ML/MIN/1.73M2 — SIGNIFICANT CHANGE UP
GLUCOSE SERPL-MCNC: 103 MG/DL — HIGH (ref 70–99)
HCO3 BLDA-SCNC: 26 MMOL/L — SIGNIFICANT CHANGE UP (ref 21–28)
HCT VFR BLD CALC: 34 % — LOW (ref 39–50)
HGB BLD-MCNC: 11.8 G/DL — LOW (ref 13–17)
HOROWITZ INDEX BLDA+IHG-RTO: 21 — SIGNIFICANT CHANGE UP
MAGNESIUM SERPL-MCNC: 1.9 MG/DL — SIGNIFICANT CHANGE UP (ref 1.6–2.6)
MCHC RBC-ENTMCNC: 30.6 PG — SIGNIFICANT CHANGE UP (ref 27–34)
MCHC RBC-ENTMCNC: 34.7 G/DL — SIGNIFICANT CHANGE UP (ref 32–36)
MCV RBC AUTO: 88.1 FL — SIGNIFICANT CHANGE UP (ref 80–100)
PCO2 BLDA: 40 MMHG — SIGNIFICANT CHANGE UP (ref 35–48)
PH BLDA: 7.42 — SIGNIFICANT CHANGE UP (ref 7.35–7.45)
PHOSPHATE SERPL-MCNC: 3.1 MG/DL — SIGNIFICANT CHANGE UP (ref 2.4–4.7)
PLATELET # BLD AUTO: 262 K/UL — SIGNIFICANT CHANGE UP (ref 150–400)
PO2 BLDA: 95 MMHG — SIGNIFICANT CHANGE UP (ref 83–108)
POTASSIUM SERPL-MCNC: 3.4 MMOL/L — LOW (ref 3.5–5.3)
POTASSIUM SERPL-SCNC: 3.4 MMOL/L — LOW (ref 3.5–5.3)
PROT SERPL-MCNC: 5.8 G/DL — LOW (ref 6.6–8.7)
RBC # BLD: 3.86 M/UL — LOW (ref 4.2–5.8)
RBC # FLD: 14.6 % — HIGH (ref 10.3–14.5)
SAO2 % BLDA: 99.6 % — HIGH (ref 94–98)
SODIUM SERPL-SCNC: 140 MMOL/L — SIGNIFICANT CHANGE UP (ref 135–145)
TROPONIN T, HIGH SENSITIVITY RESULT: 32 NG/L — SIGNIFICANT CHANGE UP (ref 0–51)
WBC # BLD: 4.97 K/UL — SIGNIFICANT CHANGE UP (ref 3.8–10.5)
WBC # FLD AUTO: 4.97 K/UL — SIGNIFICANT CHANGE UP (ref 3.8–10.5)

## 2024-12-20 PROCEDURE — 99222 1ST HOSP IP/OBS MODERATE 55: CPT

## 2024-12-20 PROCEDURE — 99233 SBSQ HOSP IP/OBS HIGH 50: CPT

## 2024-12-20 PROCEDURE — 70450 CT HEAD/BRAIN W/O DYE: CPT | Mod: 26

## 2024-12-20 PROCEDURE — 93010 ELECTROCARDIOGRAM REPORT: CPT

## 2024-12-20 RX ORDER — ACETAMINOPHEN 80 MG/.8ML
1000 SOLUTION/ DROPS ORAL ONCE
Refills: 0 | Status: COMPLETED | OUTPATIENT
Start: 2024-12-20 | End: 2024-12-20

## 2024-12-20 RX ORDER — POTASSIUM CHLORIDE 600 MG/1
40 TABLET, FILM COATED, EXTENDED RELEASE ORAL ONCE
Refills: 0 | Status: COMPLETED | OUTPATIENT
Start: 2024-12-20 | End: 2024-12-21

## 2024-12-20 RX ADMIN — Medication 25 MILLIGRAM(S): at 06:21

## 2024-12-20 RX ADMIN — CHLORHEXIDINE GLUCONATE 1 APPLICATION(S): 1.2 RINSE ORAL at 06:21

## 2024-12-20 RX ADMIN — LOSARTAN POTASSIUM 25 MILLIGRAM(S): 100 TABLET, FILM COATED ORAL at 06:22

## 2024-12-20 RX ADMIN — Medication 1 APPLICATION(S): at 06:22

## 2024-12-20 RX ADMIN — HEPARIN SODIUM 5000 UNIT(S): 1000 INJECTION, SOLUTION INTRAVENOUS; SUBCUTANEOUS at 06:22

## 2024-12-20 RX ADMIN — Medication 81 MILLIGRAM(S): at 12:14

## 2024-12-20 RX ADMIN — HEPARIN SODIUM 5000 UNIT(S): 1000 INJECTION, SOLUTION INTRAVENOUS; SUBCUTANEOUS at 21:20

## 2024-12-20 RX ADMIN — Medication 1 APPLICATION(S): at 21:21

## 2024-12-20 RX ADMIN — ACETAMINOPHEN 1000 MILLIGRAM(S): 80 SOLUTION/ DROPS ORAL at 21:00

## 2024-12-20 RX ADMIN — TICAGRELOR 90 MILLIGRAM(S): 60 TABLET ORAL at 06:21

## 2024-12-20 RX ADMIN — SODIUM CHLORIDE 75 MILLILITER(S): 9 INJECTION, SOLUTION INTRAVENOUS at 07:57

## 2024-12-20 RX ADMIN — TICAGRELOR 90 MILLIGRAM(S): 60 TABLET ORAL at 21:20

## 2024-12-20 RX ADMIN — DRONABINOL 2.5 MILLIGRAM(S): 5 SOLUTION ORAL at 21:21

## 2024-12-20 RX ADMIN — ACETAMINOPHEN 400 MILLIGRAM(S): 80 SOLUTION/ DROPS ORAL at 20:04

## 2024-12-20 RX ADMIN — SODIUM CHLORIDE 75 MILLILITER(S): 9 INJECTION, SOLUTION INTRAVENOUS at 18:17

## 2024-12-20 NOTE — PROGRESS NOTE ADULT - SUBJECTIVE AND OBJECTIVE BOX
[facility] Division of Hospital Medicine  Amanda Britton MD   I'm reachable on [employer] Teams      Patient is a 69y old  Male who presents with a chief complaint of Aphasia     (20 Dec 2024 13:50)      SUBJECTIVE / OVERNIGHT EVENTS:   Patient was seen and examined during rounds  He is more alert and awake in afternoon. He was able to tell his first name    12 points ROS negative except above    MEDICATIONS  (STANDING):  aspirin  chewable 81 milliGRAM(s) Oral daily  chlorhexidine 2% Cloths 1 Application(s) Topical <User Schedule>  dextrose 5% + sodium chloride 0.9%. 1000 milliLiter(s) (75 mL/Hr) IV Continuous <Continuous>  dronabinol 2.5 milliGRAM(s) Oral two times a day  heparin   Injectable 5000 Unit(s) SubCutaneous every 12 hours  losartan 25 milliGRAM(s) Oral daily  mupirocin 2% Nasal 1 Application(s) Both Nostrils two times a day  potassium chloride   Powder 40 milliEquivalent(s) Oral once  ticagrelor 90 milliGRAM(s) Oral two times a day    MEDICATIONS  (PRN):  hydrALAZINE Injectable 10 milliGRAM(s) IV Push every 6 hours PRN SBP >160  ondansetron Injectable 4 milliGRAM(s) IV Push every 8 hours PRN Nausea and/or Vomiting        I&O's Summary    19 Dec 2024 07:01  -  20 Dec 2024 07:00  --------------------------------------------------------  IN: 720 mL / OUT: 0 mL / NET: 720 mL        PHYSICAL EXAM:  Vital Signs Last 24 Hrs  T(C): 36.3 (20 Dec 2024 11:46), Max: 36.9 (19 Dec 2024 20:30)  T(F): 97.4 (20 Dec 2024 11:46), Max: 98.4 (19 Dec 2024 20:30)  HR: 69 (20 Dec 2024 11:46) (69 - 96)  BP: 122/69 (20 Dec 2024 11:46) (122/69 - 162/96)  BP(mean): --  RR: 18 (20 Dec 2024 11:46) (16 - 18)  SpO2: 97% (20 Dec 2024 11:46) (95% - 97%)    Parameters below as of 20 Dec 2024 11:46  Patient On (Oxygen Delivery Method): room air        CONSTITUTIONAL: NAD, Not in acute distress  EYES:  EOMI, conjunctiva and sclera clear  ENMT: , neck supple , non-tender  RESPIRATORY: Normal respiratory effort; lungs are clear to auscultation bilaterally  CARDIOVASCULAR: S1S2 present  ABDOMEN: soft. bowel sound present  MUSCLOSKELETAL: ; no clubbing or cyanosis of digits;   PSYCH: A+O to person, only  NEUROLOGY: Right sided paralysis, Moving left side with one step command  SKIN: No rashes; no palpable lesions  Extremity: No bilateral edema      LABS:                        11.8   4.97  )-----------( 262      ( 20 Dec 2024 09:09 )             34.0     12-20    140  |  105  |  10.4  ----------------------------<  103[H]  3.4[L]   |  24.0  |  0.56    Ca    8.7      20 Dec 2024 09:09  Phos  3.1     12-20  Mg     1.9     12-20    TPro  5.8[L]  /  Alb  3.4  /  TBili  0.5  /  DBili  x   /  AST  19  /  ALT  25  /  AlkPhos  85  12-20          Urinalysis Basic - ( 20 Dec 2024 09:09 )    Color: x / Appearance: x / SG: x / pH: x  Gluc: 103 mg/dL / Ketone: x  / Bili: x / Urobili: x   Blood: x / Protein: x / Nitrite: x   Leuk Esterase: x / RBC: x / WBC x   Sq Epi: x / Non Sq Epi: x / Bacteria: x        CAPILLARY BLOOD GLUCOSE          Labs reviewed:    RADIOLOGY & ADDITIONAL TESTS:  Imaging Personally Reviewed:  Electrocardiogram Personally Reviewed:

## 2024-12-20 NOTE — DIETITIAN INITIAL EVALUATION ADULT - ADD RECOMMEND
1) Continue diet as tolerated per SLP recommendation.   2) Encourage po intake, monitor diet tolerance, and provide assistance at meals as needed.   3) Add Ensure Plus High Protein BID to optimize PO intake and provide an additional 350 kcal and 20g protein per serving.   4) Rx: MVI daily.   5) Obtain weekly weights to monitor trends.

## 2024-12-20 NOTE — DISCHARGE NOTE NURSING/CASE MANAGEMENT/SOCIAL WORK - NSDCVIVACCINE_GEN_ALL_CORE_FT
Tdap; 24-Nov-2024 15:14; Kirti Castillo (RN); [employer]; [id number] (Exp. Date: 24-Nov-2024); IntraMuscular; Deltoid Right.; 0.5 milliLiter(s); VIS (VIS Published: 09-May-2013, VIS Presented: 24-Nov-2024);

## 2024-12-20 NOTE — DIETITIAN INITIAL EVALUATION ADULT - ORAL INTAKE PTA/DIET HISTORY
Patient with limited vocalization, shakes head yes and no to answer most questions. Breakfast tray at bedside untouched, pt stating "I will try it" informed nursing staff of the same as pt requires 1:1 assist with all meals. Pt with usual good appetite and no decrease in PO intake PTA. Pt with no c/o N/V/C/D at this time. Pt previously on pureed diet with no liquids and advanced to moderately thickened liquids today per SLP. Previous admission weight 165 lbs (November 2024). Current weight 168.7 lbs. No edema noted. Pt appearing thin - NFPE conducted and identified with malnutrition. Recommend addition of Ensure BID to optimize PO intake. Will continue to monitor and follow up as needed. RD remains available.

## 2024-12-20 NOTE — PROGRESS NOTE ADULT - ASSESSMENT
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 He had a recent admission was for mildly elevated troponin levels, failure to thrive, and altered mental status suspected to be due to worsening vascular dementia. He presented with reportedly developed new left-sided weakness, which has since improved. He has a right ocular implant (non-MR compatible). Head imaging demonstrates a possible enlarging right cerebellar subacute infarction compared to imaging from November. He was subsequently admitted to the Stroke service for rule out CVA. MRI was unable to be obtained due to ocular implant. Palliative was consulted for assistance with goals of care. SLP evaluated patient with recommendations for NPO. CTH was repeated today which showed "moderate to severe chronic microvascular changes without evidence of an acute transcortical infarction or hemorrhage." EEG is pending to rule out seizure. Patient now being transferred from Neurology to Medicine.    #L sided weakness, hx of CVA w/ R sided weakness  - Neurology recs appreciated  - Unable to obtain MRI due to occular implant  - Repeat CTH without any new CVA  - Neuro checks  - Continue ASA  - Start Statin, Brillinta as cleared for diet  - Telemetry    Acute metabolic encephelopathy   Central dysphagia  - likely in setting of Vascular dementia with behavioral changes with recent FTT  - Palliative following for assistance with goals of care  - SLP following,cleared for pureed diet with moderately thick liquid. they will follow to advance diet  -- continue IVFs for next day until confirmation for adequate intake  - Resume home Dronabinol  -Hold Seroquel, Sertraline, Mirtazapine as mental status waxing and weaning and some somnolance  - EEG suggestive of focal and generalized slowing  - Delirium precautions    Elevated troponin  - appears in no distress  - Trop: 36 -> 40 -> 52 -> 55, continue to trend  - Repeat EKG  - Telemetry  - Troponin was elevated to the 50s on prior admission, he was seen by Cardiology and TTE was unremarkable. Patient was deemed not a candidate for stress testing or invasive procedure. Unlikely NSTEMI per Cardiology    Bradycardia  -Hold metoprolol  -EKG shows sinus bradycardia    Advance Directives: Full code, Palliative following    DVT ppx: Heparin SQ    Dispo:  POssible discharge in 2-3 days to [facility] if mental status continue to improve and oral intake is adequate.   Need to wean from IV fluid if tolerating moderately thick liquid  Family is requesting new [facility]. [facility] management made aware

## 2024-12-20 NOTE — DISCHARGE NOTE NURSING/CASE MANAGEMENT/SOCIAL WORK - PATIENT PORTAL LINK FT
You can access the FollowMyHealth Patient Portal offered by [facility] by registering at the following website: [email or web address]. By joining [employer]’s FollowMyHealth portal, you will also be able to view your health information using other applications (apps) compatible with our system.

## 2024-12-20 NOTE — DISCHARGE NOTE NURSING/CASE MANAGEMENT/SOCIAL WORK - FINANCIAL ASSISTANCE
[facility] provides services at a reduced cost to those who are determined to be eligible through [facility]’s financial assistance program. Information regarding [facility]’s financial assistance program can be found by going to [email or web address] or by calling [phone].

## 2024-12-20 NOTE — DIETITIAN INITIAL EVALUATION ADULT - OTHER INFO
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 He had a recent admission was for mildly elevated troponin levels, failure to thrive, and altered mental status suspected to be due to worsening vascular dementia. He presented with reportedly developed new left-sided weakness, which has since improved. He has a right ocular implant (non-MR compatible). Head imaging demonstrates a possible enlarging right cerebellar subacute infarction compared to imaging from November. He was subsequently admitted to the Stroke service for rule out CVA. MRI was unable to be obtained due to ocular implant. Palliative was consulted for assistance with goals of care. SLP evaluated patient with recommendations for NPO. CTH was repeated today which showed "moderate to severe chronic microvascular changes without evidence of an acute transcortical infarction or hemorrhage." EEG is pending to rule out seizure. Patient now being transferred from Neurology to Medicine.

## 2024-12-20 NOTE — DIETITIAN INITIAL EVALUATION ADULT - PERTINENT MEDS FT
MEDICATIONS  (STANDING):  dextrose 5% + sodium chloride 0.9%. 1000 milliLiter(s) (75 mL/Hr) IV Continuous <Continuous>  dronabinol 2.5 milliGRAM(s) Oral two times a day  losartan 25 milliGRAM(s) Oral daily  metoprolol succinate ER 25 milliGRAM(s) Oral daily  potassium chloride   Powder 40 milliEquivalent(s) Oral once  ticagrelor 90 milliGRAM(s) Oral two times a day    MEDICATIONS  (PRN):  ondansetron Injectable 4 milliGRAM(s) IV Push every 8 hours PRN Nausea and/or Vomiting

## 2024-12-20 NOTE — DIETITIAN INITIAL EVALUATION ADULT - ETIOLOGY
related to inability to meet sufficient protein-energy in the setting of hx malnutrition, paralysis, CVA

## 2024-12-20 NOTE — DISCHARGE NOTE NURSING/CASE MANAGEMENT/SOCIAL WORK - NSDCPEFALRISK_GEN_ALL_CORE
RASHMI Nuno to call . Calling to notify her of her negative test results and see if she is able to RTW.  
For information on Fall & Injury Prevention, visit: [email or web address] OR  [email or web address] OR  https://www.cdc.gov/steadi/patient.html

## 2024-12-20 NOTE — CHART NOTE - NSCHARTNOTEFT_GEN_A_CORE
Notified by RN, patient bradycardic on monitor, down to 39. Patient is asymptomatic, resting in bed, is arousable. No s/sx chest pain, or acute distress. Stat EKG ordered. MD notified.     PHYSICAL EXAM:    GENERAL: NAD  HEAD:  Atraumatic, Normocephalic  CHEST/LUNG: Clear to auscultation bilaterally  HEART: s1,s2    EKG showing sinus bradycardia with sinus bradycardia with S and T wave abnormality. Notified by RN, patient going in and out of atrial bigeminy. MD notified. Will continue to monitor.

## 2024-12-20 NOTE — DIETITIAN INITIAL EVALUATION ADULT - PERTINENT LABORATORY DATA
12-20 Na140 mmol/L Glu 103 mg/dL[H] K+ 3.4 mmol/L[L] Cr  0.56 mg/dL BUN 10.4 mg/dL Phos 3.1 mg/dL Alb 3.4 g/dL    A1C with Estimated Average Glucose Result: 5.6 % (12-16-24 @ 10:17)

## 2024-12-21 LAB
ALBUMIN SERPL ELPH-MCNC: 3.2 G/DL — LOW (ref 3.3–5.2)
ALP SERPL-CCNC: 84 U/L — SIGNIFICANT CHANGE UP (ref 40–120)
ALT FLD-CCNC: 24 U/L — SIGNIFICANT CHANGE UP
ANION GAP SERPL CALC-SCNC: 13 MMOL/L — SIGNIFICANT CHANGE UP (ref 5–17)
AST SERPL-CCNC: 19 U/L — SIGNIFICANT CHANGE UP
BILIRUB SERPL-MCNC: 0.5 MG/DL — SIGNIFICANT CHANGE UP (ref 0.4–2)
BUN SERPL-MCNC: 5.2 MG/DL — LOW (ref 8–20)
CALCIUM SERPL-MCNC: 8.7 MG/DL — SIGNIFICANT CHANGE UP (ref 8.4–10.5)
CHLORIDE SERPL-SCNC: 103 MMOL/L — SIGNIFICANT CHANGE UP (ref 96–108)
CO2 SERPL-SCNC: 24 MMOL/L — SIGNIFICANT CHANGE UP (ref 22–29)
CREAT SERPL-MCNC: 0.41 MG/DL — LOW (ref 0.5–1.3)
EGFR: 118 ML/MIN/1.73M2 — SIGNIFICANT CHANGE UP
GLUCOSE SERPL-MCNC: 140 MG/DL — HIGH (ref 70–99)
HCT VFR BLD CALC: 33.6 % — LOW (ref 39–50)
HGB BLD-MCNC: 11.6 G/DL — LOW (ref 13–17)
MAGNESIUM SERPL-MCNC: 1.8 MG/DL — SIGNIFICANT CHANGE UP (ref 1.8–2.6)
MCHC RBC-ENTMCNC: 30.1 PG — SIGNIFICANT CHANGE UP (ref 27–34)
MCHC RBC-ENTMCNC: 34.5 G/DL — SIGNIFICANT CHANGE UP (ref 32–36)
MCV RBC AUTO: 87.3 FL — SIGNIFICANT CHANGE UP (ref 80–100)
PHOSPHATE SERPL-MCNC: 2.6 MG/DL — SIGNIFICANT CHANGE UP (ref 2.4–4.7)
PLATELET # BLD AUTO: 247 K/UL — SIGNIFICANT CHANGE UP (ref 150–400)
POTASSIUM SERPL-MCNC: 3.2 MMOL/L — LOW (ref 3.5–5.3)
POTASSIUM SERPL-SCNC: 3.2 MMOL/L — LOW (ref 3.5–5.3)
PROT SERPL-MCNC: 5.7 G/DL — LOW (ref 6.6–8.7)
RBC # BLD: 3.85 M/UL — LOW (ref 4.2–5.8)
RBC # FLD: 14.9 % — HIGH (ref 10.3–14.5)
SODIUM SERPL-SCNC: 140 MMOL/L — SIGNIFICANT CHANGE UP (ref 135–145)
WBC # BLD: 4.18 K/UL — SIGNIFICANT CHANGE UP (ref 3.8–10.5)
WBC # FLD AUTO: 4.18 K/UL — SIGNIFICANT CHANGE UP (ref 3.8–10.5)

## 2024-12-21 PROCEDURE — 99233 SBSQ HOSP IP/OBS HIGH 50: CPT

## 2024-12-21 PROCEDURE — 73130 X-RAY EXAM OF HAND: CPT | Mod: 26,RT

## 2024-12-21 RX ORDER — OLANZAPINE 15 MG/1
5 TABLET ORAL EVERY 8 HOURS
Refills: 0 | Status: DISCONTINUED | OUTPATIENT
Start: 2024-12-21 | End: 2024-12-24

## 2024-12-21 RX ADMIN — Medication 1 APPLICATION(S): at 17:11

## 2024-12-21 RX ADMIN — LOSARTAN POTASSIUM 25 MILLIGRAM(S): 100 TABLET, FILM COATED ORAL at 06:07

## 2024-12-21 RX ADMIN — HEPARIN SODIUM 5000 UNIT(S): 1000 INJECTION, SOLUTION INTRAVENOUS; SUBCUTANEOUS at 17:11

## 2024-12-21 RX ADMIN — TICAGRELOR 90 MILLIGRAM(S): 60 TABLET ORAL at 17:11

## 2024-12-21 RX ADMIN — DRONABINOL 2.5 MILLIGRAM(S): 5 SOLUTION ORAL at 06:07

## 2024-12-21 RX ADMIN — Medication 81 MILLIGRAM(S): at 12:07

## 2024-12-21 RX ADMIN — SODIUM CHLORIDE 75 MILLILITER(S): 9 INJECTION, SOLUTION INTRAVENOUS at 12:07

## 2024-12-21 RX ADMIN — TICAGRELOR 90 MILLIGRAM(S): 60 TABLET ORAL at 06:07

## 2024-12-21 RX ADMIN — POTASSIUM CHLORIDE 40 MILLIEQUIVALENT(S): 600 TABLET, FILM COATED, EXTENDED RELEASE ORAL at 12:07

## 2024-12-21 RX ADMIN — HEPARIN SODIUM 5000 UNIT(S): 1000 INJECTION, SOLUTION INTRAVENOUS; SUBCUTANEOUS at 06:06

## 2024-12-21 RX ADMIN — Medication 1 APPLICATION(S): at 06:06

## 2024-12-21 RX ADMIN — DRONABINOL 2.5 MILLIGRAM(S): 5 SOLUTION ORAL at 17:10

## 2024-12-21 RX ADMIN — CHLORHEXIDINE GLUCONATE 1 APPLICATION(S): 1.2 RINSE ORAL at 06:06

## 2024-12-21 NOTE — PROGRESS NOTE ADULT - SUBJECTIVE AND OBJECTIVE BOX
[facility] Division of Hospital Medicine      SUBJECTIVE / OVERNIGHT EVENTS:    pt seen and examined at bedside  pt with r hand pain  pt agitated   tolerating pureed diet  Patient denies chest pain, SOB, abd pain, N/V, fever, chills, dysuria or any other complaints. All remainder ROS negative.     MEDICATIONS  (STANDING):  aspirin  chewable 81 milliGRAM(s) Oral daily  chlorhexidine 2% Cloths 1 Application(s) Topical <User Schedule>  dextrose 5% + sodium chloride 0.9%. 1000 milliLiter(s) (75 mL/Hr) IV Continuous <Continuous>  dronabinol 2.5 milliGRAM(s) Oral two times a day  heparin   Injectable 5000 Unit(s) SubCutaneous every 12 hours  losartan 25 milliGRAM(s) Oral daily  mupirocin 2% Nasal 1 Application(s) Both Nostrils two times a day  potassium chloride   Powder 40 milliEquivalent(s) Oral once  ticagrelor 90 milliGRAM(s) Oral two times a day    MEDICATIONS  (PRN):  hydrALAZINE Injectable 10 milliGRAM(s) IV Push every 6 hours PRN SBP >160  ondansetron Injectable 4 milliGRAM(s) IV Push every 8 hours PRN Nausea and/or Vomiting        I&O's Summary      PHYSICAL EXAM:  Vital Signs Last 24 Hrs  T(C): 36.3 (21 Dec 2024 07:46), Max: 36.7 (20 Dec 2024 21:06)  T(F): 97.3 (21 Dec 2024 07:46), Max: 98.1 (20 Dec 2024 21:06)  HR: 64 (21 Dec 2024 07:46) (52 - 65)  BP: 116/71 (21 Dec 2024 07:46) (116/71 - 149/78)  BP(mean): 82 (20 Dec 2024 21:06) (82 - 82)  RR: 18 (21 Dec 2024 07:46) (18 - 18)  SpO2: 98% (21 Dec 2024 07:46) (98% - 100%)    Parameters below as of 21 Dec 2024 07:46  Patient On (Oxygen Delivery Method): room air            CONSTITUTIONAL: NAD, well-groomed  ENMT: Moist oral mucosa, no pharyngeal injection or exudates; normal dentition  RESPIRATORY: Normal respiratory effort; lungs are clear to auscultation bilaterally  CARDIOVASCULAR: Regular rate and rhythm, normal S1 and S2, no murmur/rub/gallop; No lower extremity edema; Peripheral pulses are 2+ bilaterally  ABDOMEN: Nontender to palpation, normoactive bowel sounds, no rebound/guarding; No hepatosplenomegaly  MUSCLOSKELETAL:  Normal gait; no clubbing or cyanosis of digits; no joint swelling or tenderness to palpation  PSYCH: A+O to person, place, and time; affect appropriate  NEUROLOGY: CN 2-12 are intact and symmetric; no gross sensory deficits;   SKIN: No rashes; no palpable lesions    LABS:                        11.6   4.18  )-----------( 247      ( 21 Dec 2024 09:05 )             33.6     12-21    140  |  103  |  5.2[L]  ----------------------------<  140[H]  3.2[L]   |  24.0  |  0.41[L]    Ca    8.7      21 Dec 2024 09:05  Phos  2.6     12-21  Mg     1.8     12-21    TPro  5.7[L]  /  Alb  3.2[L]  /  TBili  0.5  /  DBili  x   /  AST  19  /  ALT  24  /  AlkPhos  84  12-21          Urinalysis Basic - ( 21 Dec 2024 09:05 )    Color: x / Appearance: x / SG: x / pH: x  Gluc: 140 mg/dL / Ketone: x  / Bili: x / Urobili: x   Blood: x / Protein: x / Nitrite: x   Leuk Esterase: x / RBC: x / WBC x   Sq Epi: x / Non Sq Epi: x / Bacteria: x

## 2024-12-21 NOTE — PROGRESS NOTE ADULT - ASSESSMENT
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 He had a recent admission was for mildly elevated troponin levels, failure to thrive, and altered mental status suspected to be due to worsening vascular dementia. He presented with reportedly developed new left-sided weakness, which has since improved. He has a right ocular implant (non-MR compatible). Head imaging demonstrates a possible enlarging right cerebellar subacute infarction compared to imaging from November. He was subsequently admitted to the Stroke service for rule out CVA. MRI was unable to be obtained due to ocular implant. Palliative was consulted for assistance with goals of care. SLP evaluated patient with recommendations for pureed diet. CTH was repeated today which showed "moderate to severe chronic microvascular changes without evidence of an acute transcortical infarction or hemorrhage." EEG without evidence of seizure like activity. Patient now being transferred from Neurology to Medicine.    #L sided weakness, hx of CVA w/ R sided weakness  - Neurology recs appreciated  - Unable to obtain MRI due to occular implant  - Repeat CTH without any new CVA  - EEG w/o seizure like activity  - Neuro checks  - Continue ASA  - Start Statin, Brillinta as cleared for diet  - Telemetry    Acute metabolic encephelopathy   Central dysphagia  - likely in setting of Vascular dementia with behavioral changes with recent FTT  - Palliative following for assistance with goals of care  - SLP following,cleared for pureed diet with moderately thick liquid. they will follow to advance diet  - Resume home Dronabinol  -Hold Seroquel, Sertraline, Mirtazapine as mental status waxing and weaning and some somnolance  - EEG suggestive of focal and generalized slowing  - Delirium precautions    Elevated troponin  - appears in no distress  - Trop: 36 -> 40 -> 52 -> 55, continue to trend  - Repeat EKG  - Telemetry  - Troponin was elevated to the 50s on prior admission, he was seen by Cardiology and TTE was unremarkable. Patient was deemed not a candidate for stress testing or invasive procedure. 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 He had a recent admission was for mildly elevated troponin levels, failure to thrive, and altered mental status suspected to be due to worsening vascular dementia. He presented with reportedly developed new left-sided weakness, which has since improved. He has a right ocular implant (non-MR compatible). Head imaging demonstrates a possible enlarging right cerebellar subacute infarction compared to imaging from November. He was subsequently admitted to the Stroke service for rule out CVA. MRI was unable to be obtained due to ocular implant. Palliative was consulted for assistance with goals of care. SLP evaluated patient with recommendations for pureed diet. CTH was repeated today which showed "moderate to severe chronic microvascular changes without evidence of an acute transcortical infarction or hemorrhage." EEG without evidence of seizure like activity. Patient now being transferred from Neurology to Medicine.    #L sided weakness, hx of CVA w/ R sided weakness  - Neurology recs appreciated  - Unable to obtain MRI due to occular implant  - Repeat CTH without any new CVA  - EEG w/o seizure like activity  - Neuro checks  - Continue ASA  - Start Statin, Brillinta as cleared for diet  - Telemetry  - patient will need careful monitoring of fluid status and nutritional intake, when discharged should have chemistry closely monitored to prevent dehydration or electrolyte imbalances    Acute metabolic encephelopathy   Central dysphagia  - likely in setting of Vascular dementia with behavioral changes with recent FTT  - Palliative following for assistance with goals of care  - SLP following,cleared for pureed diet with moderately thick liquid. they will follow to advance diet  - Resume home Dronabinol  -Hold Seroquel, Sertraline, Mirtazapine as mental status waxing and weaning and some somnolance  - EEG suggestive of focal and generalized slowing  - Delirium precautions  - family does not want any use of benzos or opioids, ok w low dose zyprexa for agitation that is not verbally redirectable  - patient will need careful monitoring of fluid status and nutritional intake, when discharged should have chemistry closely monitored to prevent dehydration or electrolyte imbalances    Elevated troponin  - appears in no distress  - Trop: 36 -> 40 -> 52 -> 55, continue to trend  - Repeat EKG  - Telemetry  - Troponin was elevated to the 50s on prior admission, he was seen by Cardiology and TTE was unremarkable. Patient was deemed not a candidate for stress testing or invasive procedure. Unlikely NSTEMI per Cardiology    Bradycardia  -Hold metoprolol  -EKG shows sinus bradycardia    Advance Directives: Full code, Palliative following    DVT ppx: Heparin SQ  Diet: pureed diet  Dispo:  will need placement to [facility]

## 2024-12-22 LAB
ALBUMIN SERPL ELPH-MCNC: 3.6 G/DL — SIGNIFICANT CHANGE UP (ref 3.3–5.2)
ALP SERPL-CCNC: 87 U/L — SIGNIFICANT CHANGE UP (ref 40–120)
ALT FLD-CCNC: 28 U/L — SIGNIFICANT CHANGE UP
ANION GAP SERPL CALC-SCNC: 13 MMOL/L — SIGNIFICANT CHANGE UP (ref 5–17)
AST SERPL-CCNC: 27 U/L — SIGNIFICANT CHANGE UP
BILIRUB SERPL-MCNC: 0.5 MG/DL — SIGNIFICANT CHANGE UP (ref 0.4–2)
BUN SERPL-MCNC: 4.5 MG/DL — LOW (ref 8–20)
CALCIUM SERPL-MCNC: 9.2 MG/DL — SIGNIFICANT CHANGE UP (ref 8.4–10.5)
CHLORIDE SERPL-SCNC: 104 MMOL/L — SIGNIFICANT CHANGE UP (ref 96–108)
CO2 SERPL-SCNC: 24 MMOL/L — SIGNIFICANT CHANGE UP (ref 22–29)
CREAT SERPL-MCNC: 0.44 MG/DL — LOW (ref 0.5–1.3)
EGFR: 115 ML/MIN/1.73M2 — SIGNIFICANT CHANGE UP
GLUCOSE BLDC GLUCOMTR-MCNC: 87 MG/DL — SIGNIFICANT CHANGE UP (ref 70–99)
GLUCOSE SERPL-MCNC: 93 MG/DL — SIGNIFICANT CHANGE UP (ref 70–99)
HCT VFR BLD CALC: 35.5 % — LOW (ref 39–50)
HGB BLD-MCNC: 12.3 G/DL — LOW (ref 13–17)
MCHC RBC-ENTMCNC: 30 PG — SIGNIFICANT CHANGE UP (ref 27–34)
MCHC RBC-ENTMCNC: 34.6 G/DL — SIGNIFICANT CHANGE UP (ref 32–36)
MCV RBC AUTO: 86.6 FL — SIGNIFICANT CHANGE UP (ref 80–100)
PLATELET # BLD AUTO: 278 K/UL — SIGNIFICANT CHANGE UP (ref 150–400)
POTASSIUM SERPL-MCNC: 3.8 MMOL/L — SIGNIFICANT CHANGE UP (ref 3.5–5.3)
POTASSIUM SERPL-SCNC: 3.8 MMOL/L — SIGNIFICANT CHANGE UP (ref 3.5–5.3)
PROT SERPL-MCNC: 5.9 G/DL — LOW (ref 6.6–8.7)
RBC # BLD: 4.1 M/UL — LOW (ref 4.2–5.8)
RBC # FLD: 14.9 % — HIGH (ref 10.3–14.5)
SODIUM SERPL-SCNC: 141 MMOL/L — SIGNIFICANT CHANGE UP (ref 135–145)
WBC # BLD: 4.86 K/UL — SIGNIFICANT CHANGE UP (ref 3.8–10.5)
WBC # FLD AUTO: 4.86 K/UL — SIGNIFICANT CHANGE UP (ref 3.8–10.5)

## 2024-12-22 PROCEDURE — 99232 SBSQ HOSP IP/OBS MODERATE 35: CPT

## 2024-12-22 RX ADMIN — HEPARIN SODIUM 5000 UNIT(S): 1000 INJECTION, SOLUTION INTRAVENOUS; SUBCUTANEOUS at 18:20

## 2024-12-22 RX ADMIN — TICAGRELOR 90 MILLIGRAM(S): 60 TABLET ORAL at 18:20

## 2024-12-22 RX ADMIN — CHLORHEXIDINE GLUCONATE 1 APPLICATION(S): 1.2 RINSE ORAL at 06:29

## 2024-12-22 RX ADMIN — SODIUM CHLORIDE 75 MILLILITER(S): 9 INJECTION, SOLUTION INTRAVENOUS at 16:06

## 2024-12-22 RX ADMIN — HEPARIN SODIUM 5000 UNIT(S): 1000 INJECTION, SOLUTION INTRAVENOUS; SUBCUTANEOUS at 06:28

## 2024-12-22 RX ADMIN — DRONABINOL 2.5 MILLIGRAM(S): 5 SOLUTION ORAL at 06:28

## 2024-12-22 RX ADMIN — LOSARTAN POTASSIUM 25 MILLIGRAM(S): 100 TABLET, FILM COATED ORAL at 06:28

## 2024-12-22 RX ADMIN — TICAGRELOR 90 MILLIGRAM(S): 60 TABLET ORAL at 06:28

## 2024-12-22 RX ADMIN — Medication 1 APPLICATION(S): at 18:20

## 2024-12-22 RX ADMIN — Medication 81 MILLIGRAM(S): at 13:11

## 2024-12-22 RX ADMIN — Medication 1 APPLICATION(S): at 06:28

## 2024-12-22 NOTE — PROGRESS NOTE ADULT - SUBJECTIVE AND OBJECTIVE BOX
[facility] Division of Hospital Medicine      SUBJECTIVE / OVERNIGHT EVENTS:    pt seen and examined at bedside  confused does not appear to be in pain  does not endorse any complaints this am      MEDICATIONS  (STANDING):  aspirin  chewable 81 milliGRAM(s) Oral daily  chlorhexidine 2% Cloths 1 Application(s) Topical <User Schedule>  dextrose 5% + sodium chloride 0.9%. 1000 milliLiter(s) (75 mL/Hr) IV Continuous <Continuous>  dronabinol 2.5 milliGRAM(s) Oral two times a day  heparin   Injectable 5000 Unit(s) SubCutaneous every 12 hours  losartan 25 milliGRAM(s) Oral daily  mupirocin 2% Nasal 1 Application(s) Both Nostrils two times a day  ticagrelor 90 milliGRAM(s) Oral two times a day    MEDICATIONS  (PRN):  hydrALAZINE Injectable 10 milliGRAM(s) IV Push every 6 hours PRN SBP >160  OLANZapine Injectable 5 milliGRAM(s) IntraMuscular every 8 hours PRN severe agitation  ondansetron Injectable 4 milliGRAM(s) IV Push every 8 hours PRN Nausea and/or Vomiting        I&O's Summary    21 Dec 2024 07:01  -  22 Dec 2024 07:00  --------------------------------------------------------  IN: 900 mL / OUT: 0 mL / NET: 900 mL        PHYSICAL EXAM:  Vital Signs Last 24 Hrs  T(C): 36.8 (22 Dec 2024 08:00), Max: 36.8 (22 Dec 2024 08:00)  T(F): 98.3 (22 Dec 2024 08:00), Max: 98.3 (22 Dec 2024 08:00)  HR: 79 (22 Dec 2024 08:00) (58 - 79)  BP: 148/74 (22 Dec 2024 08:00) (138/76 - 158/85)  BP(mean): --  RR: 18 (22 Dec 2024 08:00) (18 - 18)  SpO2: 96% (22 Dec 2024 08:00) (96% - 100%)    Parameters below as of 22 Dec 2024 08:00  Patient On (Oxygen Delivery Method): room air            CONSTITUTIONAL: confused  ENMT: Moist oral mucosa, no pharyngeal injection or exudates; normal dentition  RESPIRATORY: Normal respiratory effort; lungs are clear to auscultation bilaterally  CARDIOVASCULAR: Regular rate and rhythm, normal S1 and S2, no murmur/rub/gallop; No lower extremity edema; Peripheral pulses are 2+ bilaterally  ABDOMEN: Nontender to palpation, normoactive bowel sounds, no rebound/guarding; No hepatosplenomegaly  MUSCLOSKELETAL:  Normal gait; no clubbing or cyanosis of digits; no joint swelling or tenderness to palpation  PSYCH: aaox0  NEUROLOGY: CN 2-12 are intact and symmetric; no gross sensory deficits;   SKIN: No rashes; no palpable lesions    LABS:                        12.3   4.86  )-----------( 278      ( 22 Dec 2024 06:20 )             35.5     12-22    141  |  104  |  4.5[L]  ----------------------------<  93  3.8   |  24.0  |  0.44[L]    Ca    9.2      22 Dec 2024 06:20  Phos  2.6     12-21  Mg     1.8     12-21    TPro  5.9[L]  /  Alb  3.6  /  TBili  0.5  /  DBili  x   /  AST  27  /  ALT  28  /  AlkPhos  87  12-22          Urinalysis Basic - ( 22 Dec 2024 06:20 )    Color: x / Appearance: x / SG: x / pH: x  Gluc: 93 mg/dL / Ketone: x  / Bili: x / Urobili: x   Blood: x / Protein: x / Nitrite: x   Leuk Esterase: x / RBC: x / WBC x   Sq Epi: x / Non Sq Epi: x / Bacteria: x        CAPILLARY BLOOD GLUCOSE      POCT Blood Glucose.: 87 mg/dL (22 Dec 2024 08:36)        RADIOLOGY & ADDITIONAL TESTS:  Results Reviewed:   Imaging Personally Reviewed:  Electrocardiogram Personally Reviewed:

## 2024-12-22 NOTE — PROGRESS NOTE ADULT - ASSESSMENT
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 He had a recent admission was for mildly elevated troponin levels, failure to thrive, and altered mental status suspected to be due to worsening vascular dementia. He presented with reportedly developed new left-sided weakness, which has since improved. He has a right ocular implant (non-MR compatible). Head imaging demonstrates a possible enlarging right cerebellar subacute infarction compared to imaging from November. He was subsequently admitted to the Stroke service for rule out CVA. MRI was unable to be obtained due to ocular implant. Palliative was consulted for assistance with goals of care. SLP evaluated patient with recommendations for pureed diet. CTH was repeated today which showed "moderate to severe chronic microvascular changes without evidence of an acute transcortical infarction or hemorrhage." EEG without evidence of seizure like activity. Patient now being transferred from Neurology to Medicine. Patient with poor oral intake. Patients son aware of prognosis and want to ensure medical optimization before discharge to a new [facility] (they are not happy with previous [facility] care).    #L sided weakness, hx of CVA w/ R sided weakness  - Neurology recs appreciated  - Unable to obtain MRI due to occular implant  - Repeat CTH without any new CVA  - EEG w/o seizure like activity  - Neuro checks  - Continue ASA  - cw Statin, Brillinta  - Telemetry  - patient will need careful monitoring of fluid status and nutritional intake, when discharged should have chemistry closely monitored to prevent dehydration or electrolyte imbalances    #Acute metabolic encephelopathy   #Central dysphagia  - likely in setting of Vascular dementia with behavioral changes with recent FTT  - Palliative following for assistance with goals of care  - SLP following,cleared for pureed diet with moderately thick liquid. they will follow to advance diet  - cw Dronabinol  -Hold Seroquel, Sertraline, Mirtazapine as mental status waxing and weaning and some somnolance  - EEG suggestive of focal and generalized slowing  - Delirium precautions  - family does not want any use of benzos or opioids, ok w low dose zyprexa for agitation that is not verbally redirectable  - patient will need careful monitoring of fluid status and nutritional intake, when discharged should have chemistry closely monitored to prevent dehydration or electrolyte imbalances    Elevated troponin  - appears in no distress  - Trop: 36 -> 40 -> 52 -> 55, continue to trend  - Repeat EKG  - Telemetry  - Troponin was elevated to the 50s on prior admission, he was seen by Cardiology and TTE was unremarkable. Patient was deemed not a candidate for stress testing or invasive procedure. Unlikely NSTEMI per Cardiology    Bradycardia  -Hold metoprolol  -EKG shows sinus bradycardia    Advance Directives: Full code, Palliative following    DVT ppx: Heparin SQ  Diet: pureed diet  Dispo:  will need placement to [facility]

## 2024-12-23 LAB
ALBUMIN SERPL ELPH-MCNC: 3.2 G/DL — LOW (ref 3.3–5.2)
ALP SERPL-CCNC: 83 U/L — SIGNIFICANT CHANGE UP (ref 40–120)
ALT FLD-CCNC: 27 U/L — SIGNIFICANT CHANGE UP
ANION GAP SERPL CALC-SCNC: 11 MMOL/L — SIGNIFICANT CHANGE UP (ref 5–17)
AST SERPL-CCNC: 22 U/L — SIGNIFICANT CHANGE UP
BILIRUB SERPL-MCNC: 0.5 MG/DL — SIGNIFICANT CHANGE UP (ref 0.4–2)
BUN SERPL-MCNC: 6 MG/DL — LOW (ref 8–20)
CALCIUM SERPL-MCNC: 8.7 MG/DL — SIGNIFICANT CHANGE UP (ref 8.4–10.5)
CHLORIDE SERPL-SCNC: 105 MMOL/L — SIGNIFICANT CHANGE UP (ref 96–108)
CO2 SERPL-SCNC: 24 MMOL/L — SIGNIFICANT CHANGE UP (ref 22–29)
CREAT SERPL-MCNC: 0.46 MG/DL — LOW (ref 0.5–1.3)
EGFR: 114 ML/MIN/1.73M2 — SIGNIFICANT CHANGE UP
GLUCOSE SERPL-MCNC: 97 MG/DL — SIGNIFICANT CHANGE UP (ref 70–99)
HCT VFR BLD CALC: 33.2 % — LOW (ref 39–50)
HGB BLD-MCNC: 11.6 G/DL — LOW (ref 13–17)
MCHC RBC-ENTMCNC: 30.4 PG — SIGNIFICANT CHANGE UP (ref 27–34)
MCHC RBC-ENTMCNC: 34.9 G/DL — SIGNIFICANT CHANGE UP (ref 32–36)
MCV RBC AUTO: 87.1 FL — SIGNIFICANT CHANGE UP (ref 80–100)
PLATELET # BLD AUTO: 255 K/UL — SIGNIFICANT CHANGE UP (ref 150–400)
POTASSIUM SERPL-MCNC: 3.5 MMOL/L — SIGNIFICANT CHANGE UP (ref 3.5–5.3)
POTASSIUM SERPL-SCNC: 3.5 MMOL/L — SIGNIFICANT CHANGE UP (ref 3.5–5.3)
PROT SERPL-MCNC: 5.6 G/DL — LOW (ref 6.6–8.7)
RBC # BLD: 3.81 M/UL — LOW (ref 4.2–5.8)
RBC # FLD: 14.9 % — HIGH (ref 10.3–14.5)
SODIUM SERPL-SCNC: 140 MMOL/L — SIGNIFICANT CHANGE UP (ref 135–145)
WBC # BLD: 4.44 K/UL — SIGNIFICANT CHANGE UP (ref 3.8–10.5)
WBC # FLD AUTO: 4.44 K/UL — SIGNIFICANT CHANGE UP (ref 3.8–10.5)

## 2024-12-23 PROCEDURE — 99232 SBSQ HOSP IP/OBS MODERATE 35: CPT

## 2024-12-23 PROCEDURE — 99223 1ST HOSP IP/OBS HIGH 75: CPT

## 2024-12-23 RX ORDER — DIVALPROEX SODIUM 500 MG/1
250 TABLET, DELAYED RELEASE ORAL
Refills: 0 | Status: DISCONTINUED | OUTPATIENT
Start: 2024-12-23 | End: 2024-12-31

## 2024-12-23 RX ADMIN — HEPARIN SODIUM 5000 UNIT(S): 1000 INJECTION, SOLUTION INTRAVENOUS; SUBCUTANEOUS at 06:41

## 2024-12-23 RX ADMIN — Medication 1 APPLICATION(S): at 06:42

## 2024-12-23 RX ADMIN — CHLORHEXIDINE GLUCONATE 1 APPLICATION(S): 1.2 RINSE ORAL at 06:42

## 2024-12-23 RX ADMIN — DIVALPROEX SODIUM 250 MILLIGRAM(S): 500 TABLET, DELAYED RELEASE ORAL at 19:59

## 2024-12-23 RX ADMIN — OLANZAPINE 5 MILLIGRAM(S): 15 TABLET ORAL at 08:39

## 2024-12-23 RX ADMIN — SODIUM CHLORIDE 75 MILLILITER(S): 9 INJECTION, SOLUTION INTRAVENOUS at 06:42

## 2024-12-23 RX ADMIN — HYDRALAZINE HYDROCHLORIDE 10 MILLIGRAM(S): 10 TABLET ORAL at 08:39

## 2024-12-23 RX ADMIN — TICAGRELOR 90 MILLIGRAM(S): 60 TABLET ORAL at 16:58

## 2024-12-23 RX ADMIN — HEPARIN SODIUM 5000 UNIT(S): 1000 INJECTION, SOLUTION INTRAVENOUS; SUBCUTANEOUS at 16:58

## 2024-12-23 RX ADMIN — TICAGRELOR 90 MILLIGRAM(S): 60 TABLET ORAL at 06:41

## 2024-12-23 RX ADMIN — DRONABINOL 2.5 MILLIGRAM(S): 5 SOLUTION ORAL at 16:58

## 2024-12-23 RX ADMIN — LOSARTAN POTASSIUM 25 MILLIGRAM(S): 100 TABLET, FILM COATED ORAL at 06:41

## 2024-12-23 RX ADMIN — Medication 81 MILLIGRAM(S): at 08:39

## 2024-12-23 NOTE — BH CONSULTATION LIAISON ASSESSMENT NOTE - NSBHCHARTREVIEWINVESTIGATE_PSY_A_CORE FT
< from: 12 Lead ECG (12.20.24 @ 13:35) >      Ventricular Rate 47 BPM    Atrial Rate 47 BPM    P-R Interval 182 ms    QRS Duration 100 ms    Q-T Interval 498 ms    QTC Calculation(Bazett) 440 ms    P Axis 72 degrees    R Axis 55 degrees    T Axis 134 degrees    Diagnosis Line Sinus bradycardiawith Premature atrial complexes  Left ventricular hypertrophy with repolarization abnormality Diffuse T-wave changes;  Posterior infarct , age undetermined  Abnormal ECG  Marked sinus bradycardia  Confirmed by CHARLOTTE URBINA ([id number]) on 12/20/2024 4:23:07PM    < end of copied text >

## 2024-12-23 NOTE — BH CONSULTATION LIAISON ASSESSMENT NOTE - OTHER
laying with extremities folded in bed  at Subacute rehab  Minimally cooperative, does head nod, but does not want to interact with team at this time  unknown

## 2024-12-23 NOTE — BH CONSULTATION LIAISON ASSESSMENT NOTE - NSBHCHARTREVIEWLAB_PSY_A_CORE FT
11.6   4.44  )-----------( 255      ( 23 Dec 2024 05:00 )             33.2       12-23    140  |  105  |  6.0[L]  ----------------------------<  97  3.5   |  24.0  |  0.46[L]    Ca    8.7      23 Dec 2024 05:00    TPro  5.6[L]  /  Alb  3.2[L]  /  TBili  0.5  /  DBili  x   /  AST  22  /  ALT  27  /  AlkPhos  83  12-23

## 2024-12-23 NOTE — BH CONSULTATION LIAISON ASSESSMENT NOTE - SUMMARY
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 Psychiatry was consulted for agitation.     Patient has not psychiatric history and it is unclear if patient was having any benefit from previous medications prior to admission. Does have a recent diagnosis of vascular dementia which may be causing an increase in agitation over the past few months. Will continue to hold zoloft and Seroquel. Would recommend starting Depakote 250 mg BID to help with impulsivity and mood stability. Would recommend sprinkles formulation as patient is on a purred diet. Would check blood level in 4-5 days. If patient does become increasingly agitated would recommend 2.5 mg Zyprexa IM prn q6 hours. Would hold for oversedation.     RECS   - Start Depakote sprinkles 250 mg BID, hold for sedation  * would recommend checking a blood level in 4-5 days    - PRN Zyprexa 2.5 mg IM q6 hours for acute agitation, if needed, hold for sedation   -Maintain delirium precautions   -Avoid anticholinergic agents, benzos, opioid as they can further perpetuate confusion   -Frequent re orientation, Hydration, try to avoid restraints and if possible, mobilize patient and PT involvement

## 2024-12-23 NOTE — PROGRESS NOTE ADULT - ASSESSMENT
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 He had a recent admission was for mildly elevated troponin levels, failure to thrive, and altered mental status suspected to be due to worsening vascular dementia. He presented with reportedly developed new left-sided weakness, which has since improved. He has a right ocular implant (non-MR compatible). Head imaging demonstrates a possible enlarging right cerebellar subacute infarction compared to imaging from November. He was subsequently admitted to the Stroke service for rule out CVA. MRI was unable to be obtained due to ocular implant. Palliative was consulted for assistance with goals of care. SLP evaluated patient with recommendations for pureed diet. CTH was repeated today which showed "moderate to severe chronic microvascular changes without evidence of an acute transcortical infarction or hemorrhage." EEG without evidence of seizure like activity. Patient now being transferred from Neurology to Medicine. Patient with poor oral intake. Patients son aware of prognosis and want to ensure medical optimization before discharge to a new [facility] (they are not happy with previous [facility] care).    #L sided weakness, hx of CVA w/ R sided weakness  - Neurology recs appreciated  - Unable to obtain MRI due to occular implant  - Repeat CTH without any new CVA  - EEG w/o seizure like activity  - Neuro checks  - Continue ASA  - cw Statin, Brillinta  - Telemetry  - patient will need careful monitoring of fluid status and nutritional intake, when discharged should have chemistry closely monitored to prevent dehydration or electrolyte imbalances    #Acute metabolic encephelopathy   #Central dysphagia  - likely in setting of Vascular dementia with behavioral changes with recent FTT  - Palliative following for assistance with goals of care  - SLP following,cleared for pureed diet with moderately thick liquid. they will follow to advance diet  - cw Dronabinol  -Hold Seroquel, Sertraline, Mirtazapine as mental status waxing and weaning and some somnolance  - EEG suggestive of focal and generalized slowing  - Delirium precautions  - family does not want any use of benzos or opioids, ok w low dose zyprexa for agitation that is not verbally redirectable  - patient will need careful monitoring of fluid status and nutritional intake, when discharged should have chemistry closely monitored to prevent dehydration or electrolyte imbalances    Elevated troponin  - appears in no distress  - Trop: 36 -> 40 -> 52 -> 55, continue to trend  - Repeat EKG  - Telemetry  - Troponin was elevated to the 50s on prior admission, he was seen by Cardiology and TTE was unremarkable. Patient was deemed not a candidate for stress testing or invasive procedure. Unlikely NSTEMI per Cardiology    Bradycardia  -Hold metoprolol  -EKG shows sinus bradycardia    Advance Directives: Full code, Palliative following    DVT ppx: Heparin SQ  Diet: pureed diet  Dispo:  will need placement to [facility]

## 2024-12-23 NOTE — BH CONSULTATION LIAISON ASSESSMENT NOTE - NSBHMSEKNOW_PSY_A_CORE
9/14/18 1603 Patient discharged to home, without needs, yesterday evening.        09/14/18 1603   Final Note   Assessment Type Final Discharge Note   Discharge Disposition Home   What phone number can be called within the next 1-3 days to see how you are doing after discharge? ([phone])   Hospital Follow Up  Appt(s) scheduled? Yes   Discharge plans and expectations educations in teach back method with documentation complete? Yes   Right Care Referral Info   Post Acute Recommendation (Incomplete)      Unable to assess

## 2024-12-23 NOTE — BH CONSULTATION LIAISON ASSESSMENT NOTE - NSBHCHARTREVIEWVS_PSY_A_CORE FT
Vital Signs Last 24 Hrs  T(C): 37.1 (23 Dec 2024 11:29), Max: 37.1 (23 Dec 2024 11:29)  T(F): 98.7 (23 Dec 2024 11:29), Max: 98.7 (23 Dec 2024 11:29)  HR: 70 (23 Dec 2024 11:29) (63 - 81)  BP: 144/92 (23 Dec 2024 11:29) (127/- - 164/94)  BP(mean): --  RR: 19 (23 Dec 2024 11:29) (18 - 19)  SpO2: 99% (23 Dec 2024 11:29) (95% - 99%)    Parameters below as of 23 Dec 2024 11:29  Patient On (Oxygen Delivery Method): room air

## 2024-12-23 NOTE — BH CONSULTATION LIAISON ASSESSMENT NOTE - CURRENT MEDICATION
MEDICATIONS  (STANDING):  aspirin  chewable 81 milliGRAM(s) Oral daily  chlorhexidine 2% Cloths 1 Application(s) Topical <User Schedule>  dextrose 5% + sodium chloride 0.9%. 1000 milliLiter(s) (75 mL/Hr) IV Continuous <Continuous>  dronabinol 2.5 milliGRAM(s) Oral two times a day  heparin   Injectable 5000 Unit(s) SubCutaneous every 12 hours  losartan 25 milliGRAM(s) Oral daily  ticagrelor 90 milliGRAM(s) Oral two times a day    MEDICATIONS  (PRN):  hydrALAZINE Injectable 10 milliGRAM(s) IV Push every 6 hours PRN SBP >160  OLANZapine Injectable 5 milliGRAM(s) IntraMuscular every 8 hours PRN severe agitation  ondansetron Injectable 4 milliGRAM(s) IV Push every 8 hours PRN Nausea and/or Vomiting

## 2024-12-23 NOTE — BH CONSULTATION LIAISON ASSESSMENT NOTE - HPI (INCLUDE ILLNESS QUALITY, SEVERITY, DURATION, TIMING, CONTEXT, MODIFYING FACTORS, ASSOCIATED SIGNS AND SYMPTOMS)
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 Psychiatry was consulted for agitation.     Patient is nonverbal during assessment today. He did receive 5 mg IM Zyprexa this morning and is calmly in bed. Per nurse, this morning the patient was trying to bite the aid who was giving him breakfast and kicking. Since receiving the Zyprexa, he has been calm in bed. History obtained from son.     Son states patient has had 2 large strokes in the past 6 months. He has been increasingly agitated and aggressive since his most recent stroke, 2 months ago. Son believes this is due to the patients discomfort being poked all day, and not knowing what is going on. He states these behaviors are sporadic and are not generally around family, but rather when the family is not there. He reports patient does not have any psychiatric history, and was started on Seroquel and Zoloft in September, while they were in rehab. He reports the patient has had prn Zyprexa a few times in the past, but would like to start something to decrease the need for prns.

## 2024-12-23 NOTE — PROGRESS NOTE ADULT - SUBJECTIVE AND OBJECTIVE BOX
[facility] Division of Hospital Medicine      SUBJECTIVE / OVERNIGHT EVENTS:    pt seen and examined at bedside  no complaints this am  tolerating pureed diet  naeon    MEDICATIONS  (STANDING):  aspirin  chewable 81 milliGRAM(s) Oral daily  chlorhexidine 2% Cloths 1 Application(s) Topical <User Schedule>  dextrose 5% + sodium chloride 0.9%. 1000 milliLiter(s) (75 mL/Hr) IV Continuous <Continuous>  dronabinol 2.5 milliGRAM(s) Oral two times a day  heparin   Injectable 5000 Unit(s) SubCutaneous every 12 hours  losartan 25 milliGRAM(s) Oral daily  ticagrelor 90 milliGRAM(s) Oral two times a day    MEDICATIONS  (PRN):  hydrALAZINE Injectable 10 milliGRAM(s) IV Push every 6 hours PRN SBP >160  OLANZapine Injectable 5 milliGRAM(s) IntraMuscular every 8 hours PRN severe agitation  ondansetron Injectable 4 milliGRAM(s) IV Push every 8 hours PRN Nausea and/or Vomiting        I&O's Summary    22 Dec 2024 07:01  -  23 Dec 2024 07:00  --------------------------------------------------------  IN: 675 mL / OUT: 1200 mL / NET: -525 mL    23 Dec 2024 07:01  -  23 Dec 2024 11:34  --------------------------------------------------------  IN: 600 mL / OUT: 0 mL / NET: 600 mL        PHYSICAL EXAM:  Vital Signs Last 24 Hrs  T(C): 37.1 (23 Dec 2024 11:29), Max: 37.1 (23 Dec 2024 11:29)  T(F): 98.7 (23 Dec 2024 11:29), Max: 98.7 (23 Dec 2024 11:29)  HR: 70 (23 Dec 2024 11:29) (63 - 81)  BP: 144/92 (23 Dec 2024 11:29) (127/- - 164/94)  BP(mean): --  RR: 19 (23 Dec 2024 11:29) (18 - 19)  SpO2: 99% (23 Dec 2024 11:29) (95% - 99%)    Parameters below as of 23 Dec 2024 11:29  Patient On (Oxygen Delivery Method): room air            CONSTITUTIONAL: NAD, well-groomed  ENMT: Moist oral mucosa, no pharyngeal injection or exudates; normal dentition  RESPIRATORY: Normal respiratory effort; lungs are clear to auscultation bilaterally  CARDIOVASCULAR: Regular rate and rhythm, normal S1 and S2, no murmur/rub/gallop; No lower extremity edema; Peripheral pulses are 2+ bilaterally  ABDOMEN: Nontender to palpation, normoactive bowel sounds, no rebound/guarding; No hepatosplenomegaly  MUSCLOSKELETAL:  Normal gait; no clubbing or cyanosis of digits; no joint swelling or tenderness to palpation  PSYCH: A+O to person, place, and time; affect appropriate  NEUROLOGY: CN 2-12 are intact and symmetric; no gross sensory deficits;   SKIN: No rashes; no palpable lesions    LABS:                        11.6   4.44  )-----------( 255      ( 23 Dec 2024 05:00 )             33.2     12-23    140  |  105  |  6.0[L]  ----------------------------<  97  3.5   |  24.0  |  0.46[L]    Ca    8.7      23 Dec 2024 05:00    TPro  5.6[L]  /  Alb  3.2[L]  /  TBili  0.5  /  DBili  x   /  AST  22  /  ALT  27  /  AlkPhos  83  12-23          Urinalysis Basic - ( 23 Dec 2024 05:00 )    Color: x / Appearance: x / SG: x / pH: x  Gluc: 97 mg/dL / Ketone: x  / Bili: x / Urobili: x   Blood: x / Protein: x / Nitrite: x   Leuk Esterase: x / RBC: x / WBC x   Sq Epi: x / Non Sq Epi: x / Bacteria: x        CAPILLARY BLOOD GLUCOSE            RADIOLOGY & ADDITIONAL TESTS:  Results Reviewed:   Imaging Personally Reviewed:  Electrocardiogram Personally Reviewed:

## 2024-12-23 NOTE — BH CONSULTATION LIAISON ASSESSMENT NOTE - PAST PSYCHOTROPIC MEDICATION
aspirin, D5% + Sodium chloride 0.9%, Lopressor, Lipitor, Vitamin D, zOFRAN  Protonix, Folic acid, Cozaar, Multi vitamin/minerals, senna, thiamine Seroquel 25 mg qhs  Mirtazapine 7.5 mg qd   Zoloft 50 mg qd

## 2024-12-23 NOTE — BH CONSULTATION LIAISON ASSESSMENT NOTE - RISK ASSESSMENT
PF   Family   access to treatment  no prior SA   no IP psychiatric admission     RF      Male   Chronic medical conditions

## 2024-12-23 NOTE — CHART NOTE - NSCHARTNOTEFT_GEN_A_CORE
Medicine PA-  Pt c/o ? something stuck in his throat.  No choking, vomiting or dysphagia.  VSS  Exam:   Throat- no foreign body or exudate  Pt states "better now"  Continue pureed diet and monitor

## 2024-12-24 LAB
ALBUMIN SERPL ELPH-MCNC: 3.4 G/DL — SIGNIFICANT CHANGE UP (ref 3.3–5.2)
ALP SERPL-CCNC: 83 U/L — SIGNIFICANT CHANGE UP (ref 40–120)
ALT FLD-CCNC: 27 U/L — SIGNIFICANT CHANGE UP
ANION GAP SERPL CALC-SCNC: 9 MMOL/L — SIGNIFICANT CHANGE UP (ref 5–17)
AST SERPL-CCNC: 19 U/L — SIGNIFICANT CHANGE UP
BILIRUB SERPL-MCNC: 0.4 MG/DL — SIGNIFICANT CHANGE UP (ref 0.4–2)
BUN SERPL-MCNC: 5.1 MG/DL — LOW (ref 8–20)
CALCIUM SERPL-MCNC: 9 MG/DL — SIGNIFICANT CHANGE UP (ref 8.4–10.5)
CHLORIDE SERPL-SCNC: 105 MMOL/L — SIGNIFICANT CHANGE UP (ref 96–108)
CO2 SERPL-SCNC: 25 MMOL/L — SIGNIFICANT CHANGE UP (ref 22–29)
CREAT SERPL-MCNC: 0.51 MG/DL — SIGNIFICANT CHANGE UP (ref 0.5–1.3)
EGFR: 110 ML/MIN/1.73M2 — SIGNIFICANT CHANGE UP
GLUCOSE SERPL-MCNC: 92 MG/DL — SIGNIFICANT CHANGE UP (ref 70–99)
HCT VFR BLD CALC: 32 % — LOW (ref 39–50)
HGB BLD-MCNC: 11.1 G/DL — LOW (ref 13–17)
MCHC RBC-ENTMCNC: 30.5 PG — SIGNIFICANT CHANGE UP (ref 27–34)
MCHC RBC-ENTMCNC: 34.7 G/DL — SIGNIFICANT CHANGE UP (ref 32–36)
MCV RBC AUTO: 87.9 FL — SIGNIFICANT CHANGE UP (ref 80–100)
PLATELET # BLD AUTO: 277 K/UL — SIGNIFICANT CHANGE UP (ref 150–400)
POTASSIUM SERPL-MCNC: 3.4 MMOL/L — LOW (ref 3.5–5.3)
POTASSIUM SERPL-SCNC: 3.4 MMOL/L — LOW (ref 3.5–5.3)
PROT SERPL-MCNC: 5.6 G/DL — LOW (ref 6.6–8.7)
RBC # BLD: 3.64 M/UL — LOW (ref 4.2–5.8)
RBC # FLD: 14.9 % — HIGH (ref 10.3–14.5)
SODIUM SERPL-SCNC: 139 MMOL/L — SIGNIFICANT CHANGE UP (ref 135–145)
WBC # BLD: 4.4 K/UL — SIGNIFICANT CHANGE UP (ref 3.8–10.5)
WBC # FLD AUTO: 4.4 K/UL — SIGNIFICANT CHANGE UP (ref 3.8–10.5)

## 2024-12-24 PROCEDURE — 99232 SBSQ HOSP IP/OBS MODERATE 35: CPT

## 2024-12-24 RX ORDER — OLANZAPINE 15 MG/1
2.5 TABLET ORAL EVERY 6 HOURS
Refills: 0 | Status: DISCONTINUED | OUTPATIENT
Start: 2024-12-24 | End: 2024-12-31

## 2024-12-24 RX ADMIN — HEPARIN SODIUM 5000 UNIT(S): 1000 INJECTION, SOLUTION INTRAVENOUS; SUBCUTANEOUS at 05:20

## 2024-12-24 RX ADMIN — CHLORHEXIDINE GLUCONATE 1 APPLICATION(S): 1.2 RINSE ORAL at 05:21

## 2024-12-24 RX ADMIN — HEPARIN SODIUM 5000 UNIT(S): 1000 INJECTION, SOLUTION INTRAVENOUS; SUBCUTANEOUS at 17:59

## 2024-12-24 RX ADMIN — DIVALPROEX SODIUM 250 MILLIGRAM(S): 500 TABLET, DELAYED RELEASE ORAL at 09:25

## 2024-12-24 RX ADMIN — TICAGRELOR 90 MILLIGRAM(S): 60 TABLET ORAL at 05:20

## 2024-12-24 RX ADMIN — SODIUM CHLORIDE 75 MILLILITER(S): 9 INJECTION, SOLUTION INTRAVENOUS at 02:42

## 2024-12-24 RX ADMIN — LOSARTAN POTASSIUM 25 MILLIGRAM(S): 100 TABLET, FILM COATED ORAL at 05:20

## 2024-12-24 RX ADMIN — TICAGRELOR 90 MILLIGRAM(S): 60 TABLET ORAL at 17:59

## 2024-12-24 RX ADMIN — Medication 81 MILLIGRAM(S): at 09:25

## 2024-12-24 RX ADMIN — DRONABINOL 2.5 MILLIGRAM(S): 5 SOLUTION ORAL at 05:20

## 2024-12-24 RX ADMIN — SODIUM CHLORIDE 75 MILLILITER(S): 9 INJECTION, SOLUTION INTRAVENOUS at 08:13

## 2024-12-24 RX ADMIN — OLANZAPINE 5 MILLIGRAM(S): 15 TABLET ORAL at 03:48

## 2024-12-24 RX ADMIN — DIVALPROEX SODIUM 250 MILLIGRAM(S): 500 TABLET, DELAYED RELEASE ORAL at 17:59

## 2024-12-24 RX ADMIN — DRONABINOL 2.5 MILLIGRAM(S): 5 SOLUTION ORAL at 17:59

## 2024-12-24 NOTE — PROGRESS NOTE ADULT - SUBJECTIVE AND OBJECTIVE BOX
[facility] Division of Hospital Medicine    SUBJECTIVE / OVERNIGHT EVENTS:    pt seen and examined at bedside  pt comfortable but confused at times  tolerating pureed diet  naeon    MEDICATIONS  (STANDING):  aspirin  chewable 81 milliGRAM(s) Oral daily  chlorhexidine 2% Cloths 1 Application(s) Topical <User Schedule>  dextrose 5% + sodium chloride 0.9%. 1000 milliLiter(s) (75 mL/Hr) IV Continuous <Continuous>  divalproex Sprinkle 250 milliGRAM(s) Oral two times a day  dronabinol 2.5 milliGRAM(s) Oral two times a day  heparin   Injectable 5000 Unit(s) SubCutaneous every 12 hours  losartan 25 milliGRAM(s) Oral daily  ticagrelor 90 milliGRAM(s) Oral two times a day    MEDICATIONS  (PRN):  hydrALAZINE Injectable 10 milliGRAM(s) IV Push every 6 hours PRN SBP >160  OLANZapine Injectable 5 milliGRAM(s) IntraMuscular every 8 hours PRN severe agitation  ondansetron Injectable 4 milliGRAM(s) IV Push every 8 hours PRN Nausea and/or Vomiting        I&O's Summary    23 Dec 2024 07:01  -  24 Dec 2024 07:00  --------------------------------------------------------  IN: 1425 mL / OUT: 1600 mL / NET: -175 mL        PHYSICAL EXAM:  Vital Signs Last 24 Hrs  T(C): 36.7 (24 Dec 2024 08:44), Max: 36.8 (24 Dec 2024 01:05)  T(F): 98 (24 Dec 2024 08:44), Max: 98.2 (24 Dec 2024 01:05)  HR: 64 (24 Dec 2024 08:44) (60 - 78)  BP: 166/86 (24 Dec 2024 08:44) (140/80 - 166/86)  BP(mean): --  RR: 19 (24 Dec 2024 08:44) (18 - 19)  SpO2: 98% (24 Dec 2024 08:44) (96% - 100%)    Parameters below as of 24 Dec 2024 08:44  Patient On (Oxygen Delivery Method): room air            CONSTITUTIONAL: NAD, well-groomed  ENMT: Moist oral mucosa, no pharyngeal injection or exudates; normal dentition  RESPIRATORY: Normal respiratory effort; lungs are clear to auscultation bilaterally  CARDIOVASCULAR: Regular rate and rhythm, normal S1 and S2, no murmur/rub/gallop; No lower extremity edema; Peripheral pulses are 2+ bilaterally  ABDOMEN: Nontender to palpation, normoactive bowel sounds, no rebound/guarding; No hepatosplenomegaly  MUSCLOSKELETAL:  Normal gait; no clubbing or cyanosis of digits; no joint swelling or tenderness to palpation  PSYCH: A+O to person, place, and time; affect appropriate  NEUROLOGY: CN 2-12 are intact and symmetric; no gross sensory deficits;   SKIN: No rashes; no palpable lesions    LABS:                        11.1   4.40  )-----------( 277      ( 24 Dec 2024 05:30 )             32.0     12-24    139  |  105  |  5.1[L]  ----------------------------<  92  3.4[L]   |  25.0  |  0.51    Ca    9.0      24 Dec 2024 05:30    TPro  5.6[L]  /  Alb  3.4  /  TBili  0.4  /  DBili  x   /  AST  19  /  ALT  27  /  AlkPhos  83  12-24          Urinalysis Basic - ( 24 Dec 2024 05:30 )    Color: x / Appearance: x / SG: x / pH: x  Gluc: 92 mg/dL / Ketone: x  / Bili: x / Urobili: x   Blood: x / Protein: x / Nitrite: x   Leuk Esterase: x / RBC: x / WBC x   Sq Epi: x / Non Sq Epi: x / Bacteria: x

## 2024-12-24 NOTE — BH CONSULTATION LIAISON PROGRESS NOTE - OTHER
laying with extremities folded in bed  restless in bed  Minimally cooperative, does head nod at times

## 2024-12-24 NOTE — BH CONSULTATION LIAISON PROGRESS NOTE - NSBHASSESSMENTFT_PSY_ALL_CORE
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 Psychiatry was consulted for agitation.     Patient continues to be restless in bed and require frequent reorientation. He did require IM Zyprexa overnight, would recommend decreasing from 5 mg to 2.5 mg. Patient recently started on Depakote, and may take a few doses to achieve a steady state. Would continue at this time and check a trough 12/27 prior to am dose. If patient does become increasingly agitated would recommend 2.5 mg Zyprexa IM prn q6 hours. Would hold for oversedation.     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 Psychiatry was consulted for agitation.     Patient continues to be restless in bed and require frequent reorientation. He did require IM Zyprexa overnight, would recommend decreasing from 5 mg to 2.5 mg. Patient recently started on Depakote, and may take a few doses to achieve a steady state. Would continue at this time and check a trough 12/27 prior to am dose. If patient does become increasingly agitated would recommend 2.5 mg Zyprexa IM prn q6 hours. Would only utilize Zyprexa if patient is becoming combative and is not redirectable. Patient does seem to require frequent reorientation. Would hold for oversedation.     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 Psychiatry was consulted for agitation.     Patient continues to be restless in bed and require frequent reorientation. He did require IM Zyprexa overnight, would recommend decreasing from 5 mg to 2.5 mg. Patient recently started on Depakote, and may take a few doses to achieve a steady state. Would continue at this time and check a trough 12/27 prior to am dose. If patient does become increasingly agitated would recommend 2.5 mg Zyprexa IM prn q6 hours. Would only utilize Zyprexa if patient is becoming combative and is not redirectable. Patient does seem to require frequent reorientation. Would hold for oversedation.     RECS   - Continue Depakote sprinkles 250 mg BID, hold for sedation  * would recommend checking a trough blood level in 4-5 days    - PRN Zyprexa 2.5 mg IM q6 hours for acute agitation, if needed, hold for sedation   -Maintain delirium precautions   -Avoid anticholinergic agents, benzos, opioid as they can further perpetuate confusion   -Frequent re orientation, Hydration, try to avoid restraints and if possible, mobiliz

## 2024-12-24 NOTE — BH CONSULTATION LIAISON PROGRESS NOTE - NSBHFUPINTERVALHXFT_PSY_A_CORE
Patient resting in bed this morning, restless. He replies intermittently with head nods. He did not sleep well overnight. He is not in pain at this time. He is not having any self harm thoughts. Reportedly received 5 mg IM Zyprexa over night. Per nursing, patient has been restless all morning as well as last night.

## 2024-12-24 NOTE — PROGRESS NOTE ADULT - ASSESSMENT
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 from June 14th to July 17th, 2024) presents from [facility] after a mechanical fall. He had a recent admission was for mildly elevated troponin levels, failure to thrive, and altered mental status suspected to be due to worsening vascular dementia. He presented with reportedly developed new left-sided weakness, which has since improved. He has a right ocular implant (non-MR compatible). Head imaging demonstrates a possible enlarging right cerebellar subacute infarction compared to imaging from November. He was subsequently admitted to the Stroke service for rule out CVA. MRI was unable to be obtained due to ocular implant. Palliative was consulted for assistance with goals of care. SLP evaluated patient with recommendations for pureed diet. CTH was repeated today which showed "moderate to severe chronic microvascular changes without evidence of an acute transcortical infarction or hemorrhage." EEG without evidence of seizure like activity. Patient now being transferred from Neurology to Medicine. Patient with poor oral intake. Patients son aware of prognosis and want to ensure medical optimization before discharge to a new [facility] (they are not happy with previous [facility] care).    #L sided weakness, hx of CVA w/ R sided weakness  - Neurology recs appreciated  - Unable to obtain MRI due to occular implant  - Repeat CTH without any new CVA  - EEG w/o seizure like activity  - Neuro checks  - Continue ASA  - cw Statin, Brillinta  - Telemetry  - patient will need careful monitoring of fluid status and nutritional intake, when discharged should have chemistry closely monitored to prevent dehydration or electrolyte imbalances  -cw depakote 250mg q12hrs - check level on 12/28    #Acute metabolic encephelopathy   #Central dysphagia  - likely in setting of Vascular dementia with behavioral changes with recent FTT  - Palliative following for assistance with goals of care  - SLP following,cleared for pureed diet with moderately thick liquid. they will follow to advance diet  - cw Dronabinol  -Hold Seroquel, Sertraline, Mirtazapine as mental status waxing and weaning and some somnolance  - EEG suggestive of focal and generalized slowing  - Delirium precautions  - family does not want any use of benzos or opioids, ok w low dose zyprexa for agitation that is not verbally redirectable  - patient will need careful monitoring of fluid status and nutritional intake, when discharged should have chemistry closely monitored to prevent dehydration or electrolyte imbalances    Elevated troponin  - appears in no distress  - Trop: 36 -> 40 -> 52 -> 55, continue to trend  - Repeat EKG  - Telemetry  - Troponin was elevated to the 50s on prior admission, he was seen by Cardiology and TTE was unremarkable. Patient was deemed not a candidate for stress testing or invasive procedure. Unlikely NSTEMI per Cardiology    Bradycardia  -Hold metoprolol  -EKG shows sinus bradycardia    Advance Directives: Full code, Palliative following    DVT ppx: Heparin SQ  Diet: pureed diet  Dispo:  will need placement to [facility]

## 2024-12-25 LAB
ALBUMIN SERPL ELPH-MCNC: 3.5 G/DL — SIGNIFICANT CHANGE UP (ref 3.3–5.2)
ALP SERPL-CCNC: 92 U/L — SIGNIFICANT CHANGE UP (ref 40–120)
ALT FLD-CCNC: 25 U/L — SIGNIFICANT CHANGE UP
ANION GAP SERPL CALC-SCNC: 12 MMOL/L — SIGNIFICANT CHANGE UP (ref 5–17)
AST SERPL-CCNC: 18 U/L — SIGNIFICANT CHANGE UP
BILIRUB SERPL-MCNC: 0.5 MG/DL — SIGNIFICANT CHANGE UP (ref 0.4–2)
BUN SERPL-MCNC: 6.5 MG/DL — LOW (ref 8–20)
CALCIUM SERPL-MCNC: 9.1 MG/DL — SIGNIFICANT CHANGE UP (ref 8.4–10.5)
CHLORIDE SERPL-SCNC: 107 MMOL/L — SIGNIFICANT CHANGE UP (ref 96–108)
CO2 SERPL-SCNC: 25 MMOL/L — SIGNIFICANT CHANGE UP (ref 22–29)
CREAT SERPL-MCNC: 0.51 MG/DL — SIGNIFICANT CHANGE UP (ref 0.5–1.3)
EGFR: 110 ML/MIN/1.73M2 — SIGNIFICANT CHANGE UP
GLUCOSE SERPL-MCNC: 95 MG/DL — SIGNIFICANT CHANGE UP (ref 70–99)
HCT VFR BLD CALC: 33.7 % — LOW (ref 39–50)
HGB BLD-MCNC: 11.5 G/DL — LOW (ref 13–17)
MCHC RBC-ENTMCNC: 30.1 PG — SIGNIFICANT CHANGE UP (ref 27–34)
MCHC RBC-ENTMCNC: 34.1 G/DL — SIGNIFICANT CHANGE UP (ref 32–36)
MCV RBC AUTO: 88.2 FL — SIGNIFICANT CHANGE UP (ref 80–100)
PLATELET # BLD AUTO: 288 K/UL — SIGNIFICANT CHANGE UP (ref 150–400)
POTASSIUM SERPL-MCNC: 3.6 MMOL/L — SIGNIFICANT CHANGE UP (ref 3.5–5.3)
POTASSIUM SERPL-SCNC: 3.6 MMOL/L — SIGNIFICANT CHANGE UP (ref 3.5–5.3)
PROT SERPL-MCNC: 6 G/DL — LOW (ref 6.6–8.7)
RBC # BLD: 3.82 M/UL — LOW (ref 4.2–5.8)
RBC # FLD: 15.2 % — HIGH (ref 10.3–14.5)
SODIUM SERPL-SCNC: 144 MMOL/L — SIGNIFICANT CHANGE UP (ref 135–145)
WBC # BLD: 4.78 K/UL — SIGNIFICANT CHANGE UP (ref 3.8–10.5)
WBC # FLD AUTO: 4.78 K/UL — SIGNIFICANT CHANGE UP (ref 3.8–10.5)

## 2024-12-25 PROCEDURE — 99232 SBSQ HOSP IP/OBS MODERATE 35: CPT

## 2024-12-25 RX ADMIN — TICAGRELOR 90 MILLIGRAM(S): 60 TABLET ORAL at 05:49

## 2024-12-25 RX ADMIN — HEPARIN SODIUM 5000 UNIT(S): 1000 INJECTION, SOLUTION INTRAVENOUS; SUBCUTANEOUS at 17:57

## 2024-12-25 RX ADMIN — LOSARTAN POTASSIUM 25 MILLIGRAM(S): 100 TABLET, FILM COATED ORAL at 05:49

## 2024-12-25 RX ADMIN — DRONABINOL 2.5 MILLIGRAM(S): 5 SOLUTION ORAL at 05:47

## 2024-12-25 RX ADMIN — SODIUM CHLORIDE 75 MILLILITER(S): 9 INJECTION, SOLUTION INTRAVENOUS at 22:36

## 2024-12-25 RX ADMIN — SODIUM CHLORIDE 75 MILLILITER(S): 9 INJECTION, SOLUTION INTRAVENOUS at 01:31

## 2024-12-25 RX ADMIN — SODIUM CHLORIDE 75 MILLILITER(S): 9 INJECTION, SOLUTION INTRAVENOUS at 08:44

## 2024-12-25 RX ADMIN — TICAGRELOR 90 MILLIGRAM(S): 60 TABLET ORAL at 17:56

## 2024-12-25 RX ADMIN — DRONABINOL 2.5 MILLIGRAM(S): 5 SOLUTION ORAL at 17:56

## 2024-12-25 RX ADMIN — DIVALPROEX SODIUM 250 MILLIGRAM(S): 500 TABLET, DELAYED RELEASE ORAL at 05:49

## 2024-12-25 RX ADMIN — Medication 81 MILLIGRAM(S): at 12:02

## 2024-12-25 RX ADMIN — DIVALPROEX SODIUM 250 MILLIGRAM(S): 500 TABLET, DELAYED RELEASE ORAL at 17:57

## 2024-12-25 RX ADMIN — CHLORHEXIDINE GLUCONATE 1 APPLICATION(S): 1.2 RINSE ORAL at 05:49

## 2024-12-25 RX ADMIN — HEPARIN SODIUM 5000 UNIT(S): 1000 INJECTION, SOLUTION INTRAVENOUS; SUBCUTANEOUS at 05:49

## 2024-12-25 RX ADMIN — OLANZAPINE 2.5 MILLIGRAM(S): 15 TABLET ORAL at 02:22

## 2024-12-25 NOTE — PROGRESS NOTE ADULT - SUBJECTIVE AND OBJECTIVE BOX
[facility] Division of Hospital Medicine    SUBJECTIVE / OVERNIGHT EVENTS:    pt seen and examined at bedside  pt comfortable in bed  no complaints voiced  tolerating po diet  normal stooling and urination    MEDICATIONS  (STANDING):  aspirin  chewable 81 milliGRAM(s) Oral daily  chlorhexidine 2% Cloths 1 Application(s) Topical <User Schedule>  dextrose 5% + sodium chloride 0.9%. 1000 milliLiter(s) (75 mL/Hr) IV Continuous <Continuous>  divalproex Sprinkle 250 milliGRAM(s) Oral two times a day  dronabinol 2.5 milliGRAM(s) Oral two times a day  heparin   Injectable 5000 Unit(s) SubCutaneous every 12 hours  losartan 25 milliGRAM(s) Oral daily  ticagrelor 90 milliGRAM(s) Oral two times a day    MEDICATIONS  (PRN):  hydrALAZINE Injectable 10 milliGRAM(s) IV Push every 6 hours PRN SBP >160  OLANZapine Injectable 2.5 milliGRAM(s) IntraMuscular every 6 hours PRN acute agitation, if needed, hold for sedation  ondansetron Injectable 4 milliGRAM(s) IV Push every 8 hours PRN Nausea and/or Vomiting        I&O's Summary    24 Dec 2024 07:01  -  25 Dec 2024 07:00  --------------------------------------------------------  IN: 1545 mL / OUT: 1850 mL / NET: -305 mL        PHYSICAL EXAM:  Vital Signs Last 24 Hrs  T(C): 36.3 (25 Dec 2024 08:02), Max: 36.7 (24 Dec 2024 21:36)  T(F): 97.3 (25 Dec 2024 08:02), Max: 98.1 (24 Dec 2024 21:36)  HR: 74 (25 Dec 2024 08:02) (74 - 92)  BP: 131/78 (25 Dec 2024 08:02) (128/75 - 155/82)  BP(mean): 92 (24 Dec 2024 16:17) (92 - 92)  RR: 17 (25 Dec 2024 08:02) (17 - 19)  SpO2: 98% (25 Dec 2024 08:02) (96% - 98%)    Parameters below as of 25 Dec 2024 08:02  Patient On (Oxygen Delivery Method): room air            CONSTITUTIONAL: NAD, well-groomed  ENMT: Moist oral mucosa, no pharyngeal injection or exudates; normal dentition  RESPIRATORY: Normal respiratory effort; lungs are clear to auscultation bilaterally  CARDIOVASCULAR: Regular rate and rhythm, normal S1 and S2, no murmur/rub/gallop; No lower extremity edema; Peripheral pulses are 2+ bilaterally  ABDOMEN: Nontender to palpation, normoactive bowel sounds, no rebound/guarding; No hepatosplenomegaly  MUSCLOSKELETAL:  Normal gait; no clubbing or cyanosis of digits; no joint swelling or tenderness to palpation  PSYCH: A+O to person, place, and time; affect appropriate  NEUROLOGY: CN 2-12 are intact and symmetric; no gross sensory deficits;   SKIN: No rashes; no palpable lesions    LABS:                        11.5   4.78  )-----------( 288      ( 25 Dec 2024 04:40 )             33.7     12-25    144  |  107  |  6.5[L]  ----------------------------<  95  3.6   |  25.0  |  0.51    Ca    9.1      25 Dec 2024 04:40    TPro  6.0[L]  /  Alb  3.5  /  TBili  0.5  /  DBili  x   /  AST  18  /  ALT  25  /  AlkPhos  92  12-25          Urinalysis Basic - ( 25 Dec 2024 04:40 )    Color: x / Appearance: x / SG: x / pH: x  Gluc: 95 mg/dL / Ketone: x  / Bili: x / Urobili: x   Blood: x / Protein: x / Nitrite: x   Leuk Esterase: x / RBC: x / WBC x   Sq Epi: x / Non Sq Epi: x / Bacteria: x        CAPILLARY BLOOD GLUCOSE            RADIOLOGY & ADDITIONAL TESTS:  Results Reviewed:   Imaging Personally Reviewed:  Electrocardiogram Personally Reviewed:

## 2024-12-25 NOTE — PROGRESS NOTE ADULT - ASSESSMENT
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 He had a recent admission was for mildly elevated troponin levels, failure to thrive, and altered mental status suspected to be due to worsening vascular dementia. He presented with reportedly developed new left-sided weakness, which has since improved. He has a right ocular implant (non-MR compatible). Head imaging demonstrates a possible enlarging right cerebellar subacute infarction compared to imaging from November. He was subsequently admitted to the Stroke service for rule out CVA. MRI was unable to be obtained due to ocular implant. Palliative was consulted for assistance with goals of care. SLP evaluated patient with recommendations for pureed diet. CTH was repeated today which showed "moderate to severe chronic microvascular changes without evidence of an acute transcortical infarction or hemorrhage." EEG without evidence of seizure like activity. Patient now being transferred from Neurology to Medicine. Patient with poor oral intake. Patients son aware of prognosis and want to ensure medical optimization before discharge to a new [facility] (they are not happy with previous [facility] care).    #L sided weakness, hx of CVA w/ R sided weakness  - Neurology recs appreciated  - Unable to obtain MRI due to occular implant  - Repeat CTH without any new CVA  - EEG w/o seizure like activity  - Neuro checks  - Continue ASA  - cw Statin, Brillinta  - Telemetry  - patient will need careful monitoring of fluid status and nutritional intake, when discharged should have chemistry closely monitored to prevent dehydration or electrolyte imbalances  -cw depakote 250mg q12hrs - check level on 12/28    #Acute metabolic encephelopathy   #Central dysphagia  - likely in setting of Vascular dementia with behavioral changes with recent FTT  - Palliative following for assistance with goals of care  - SLP following,cleared for pureed diet with moderately thick liquid. they will follow to advance diet  - cw Dronabinol  -Hold Seroquel, Sertraline, Mirtazapine as mental status waxing and weaning and some somnolance  - EEG suggestive of focal and generalized slowing  - Delirium precautions  - family does not want any use of benzos or opioids, ok w low dose zyprexa for agitation that is not verbally redirectable  - patient will need careful monitoring of fluid status and nutritional intake, when discharged should have chemistry closely monitored to prevent dehydration or electrolyte imbalances    Elevated troponin  - appears in no distress  - Trop: 36 -> 40 -> 52 -> 55, continue to trend  - Repeat EKG  - Telemetry  - Troponin was elevated to the 50s on prior admission, he was seen by Cardiology and TTE was unremarkable. Patient was deemed not a candidate for stress testing or invasive procedure. Unlikely NSTEMI per Cardiology    Bradycardia  -Hold metoprolol  -EKG shows sinus bradycardia    Advance Directives: Full code, Palliative following    DVT ppx: Heparin SQ  Diet: pureed diet  Dispo:  will need placement to [facility] (family still choosing) - patient medically ready

## 2024-12-25 NOTE — PROVIDER CONTACT NOTE (OTHER) - ASSESSMENT
Patient asymptomatic. /86, Pulse 92, Temp 98.6. CEE Lance assessed patient at bedside. Patient asymptomatic. Denied chest pain, palpitation, N/V  or generalized pain. /86, Pulse 92, Temp 98.6. CEE Lance assessed patient at bedside. Patient asymptomatic. Patient is also extremely agitated at this time, attempt to get out of bed. Denied chest pain, palpitation, N/V  or generalized pain. /86, Pulse 92, Temp 98.6. CEE Lance assessed patient at bedside. Patient is being monitored closely.

## 2024-12-25 NOTE — PROVIDER CONTACT NOTE (OTHER) - NAME OF MD/NP/PA/DO NOTIFIED:
Received call from monitor tech that patient had a 1 minute sustained tacycardia. Received call from monitor tech that patient had a 1 minute sustained sinus tacycardia.

## 2024-12-25 NOTE — CHART NOTE - NSCHARTNOTEFT_GEN_A_CORE
Called by RN stating that patient's heart rate had gone up to 120 sinus rhythm for around a minute. Patient seen at bedside. Patient aphasic, has been very agitated and trying to get out of bed. HR jumped up to 160s while patient was attempting to get out of bed and calling for help. Patient admitted for mechanical fall and is confused at times but able to answer questions. Denies chest pain, palpitations, SOB, light-headedness, dizziness, weakness. Soft vest ordered. Zyprexa given. Continue to monitor patient and consider de-escalation. RN to re-call with any changes in patient's status.

## 2024-12-26 PROCEDURE — 99232 SBSQ HOSP IP/OBS MODERATE 35: CPT

## 2024-12-26 RX ORDER — SODIUM CHLORIDE 9 MG/ML
1000 INJECTION, SOLUTION INTRAVENOUS
Refills: 0 | Status: DISCONTINUED | OUTPATIENT
Start: 2024-12-26 | End: 2024-12-26

## 2024-12-26 RX ORDER — ACETAMINOPHEN 80 MG/.8ML
1000 SOLUTION/ DROPS ORAL ONCE
Refills: 0 | Status: COMPLETED | OUTPATIENT
Start: 2024-12-26 | End: 2024-12-26

## 2024-12-26 RX ADMIN — HEPARIN SODIUM 5000 UNIT(S): 1000 INJECTION, SOLUTION INTRAVENOUS; SUBCUTANEOUS at 06:00

## 2024-12-26 RX ADMIN — CHLORHEXIDINE GLUCONATE 1 APPLICATION(S): 1.2 RINSE ORAL at 06:50

## 2024-12-26 RX ADMIN — DIVALPROEX SODIUM 250 MILLIGRAM(S): 500 TABLET, DELAYED RELEASE ORAL at 17:42

## 2024-12-26 RX ADMIN — ACETAMINOPHEN 1000 MILLIGRAM(S): 80 SOLUTION/ DROPS ORAL at 12:15

## 2024-12-26 RX ADMIN — Medication 81 MILLIGRAM(S): at 12:42

## 2024-12-26 RX ADMIN — ACETAMINOPHEN 400 MILLIGRAM(S): 80 SOLUTION/ DROPS ORAL at 12:40

## 2024-12-26 RX ADMIN — DRONABINOL 2.5 MILLIGRAM(S): 5 SOLUTION ORAL at 17:43

## 2024-12-26 RX ADMIN — HEPARIN SODIUM 5000 UNIT(S): 1000 INJECTION, SOLUTION INTRAVENOUS; SUBCUTANEOUS at 17:43

## 2024-12-26 RX ADMIN — TICAGRELOR 90 MILLIGRAM(S): 60 TABLET ORAL at 17:42

## 2024-12-26 NOTE — PROGRESS NOTE ADULT - SUBJECTIVE AND OBJECTIVE BOX
[facility] Division of Hospital Medicine      SUBJECTIVE / OVERNIGHT EVENTS:    pt seen and examined at bedside  pt occasionally confused, this am refused depakote  no apparent pain or discomfort on my eval    MEDICATIONS  (STANDING):  acetaminophen   IVPB .. 1000 milliGRAM(s) IV Intermittent once  aspirin  chewable 81 milliGRAM(s) Oral daily  chlorhexidine 2% Cloths 1 Application(s) Topical <User Schedule>  dextrose 5% + sodium chloride 0.9%. 1000 milliLiter(s) (75 mL/Hr) IV Continuous <Continuous>  divalproex Sprinkle 250 milliGRAM(s) Oral two times a day  dronabinol 2.5 milliGRAM(s) Oral two times a day  heparin   Injectable 5000 Unit(s) SubCutaneous every 12 hours  losartan 25 milliGRAM(s) Oral daily  ticagrelor 90 milliGRAM(s) Oral two times a day    MEDICATIONS  (PRN):  hydrALAZINE Injectable 10 milliGRAM(s) IV Push every 6 hours PRN SBP >160  OLANZapine Injectable 2.5 milliGRAM(s) IntraMuscular every 6 hours PRN acute agitation, if needed, hold for sedation  ondansetron Injectable 4 milliGRAM(s) IV Push every 8 hours PRN Nausea and/or Vomiting        I&O's Summary    25 Dec 2024 07:01  -  26 Dec 2024 07:00  --------------------------------------------------------  IN: 1415 mL / OUT: 1200 mL / NET: 215 mL        PHYSICAL EXAM:  Vital Signs Last 24 Hrs  T(C): 36.8 (26 Dec 2024 06:20), Max: 36.9 (26 Dec 2024 00:20)  T(F): 98.2 (26 Dec 2024 06:20), Max: 98.4 (26 Dec 2024 00:20)  HR: 64 (26 Dec 2024 06:20) (64 - 88)  BP: 167/110 (26 Dec 2024 06:20) (131/82 - 167/110)  BP(mean): 98 (25 Dec 2024 16:44) (98 - 98)  RR: 19 (26 Dec 2024 06:20) (17 - 19)  SpO2: 96% (26 Dec 2024 06:20) (96% - 99%)    Parameters below as of 26 Dec 2024 06:20  Patient On (Oxygen Delivery Method): room air            CONSTITUTIONAL: NAD, well-groomed  ENMT: Moist oral mucosa, no pharyngeal injection or exudates; normal dentition  RESPIRATORY: Normal respiratory effort; lungs are clear to auscultation bilaterally  CARDIOVASCULAR: Regular rate and rhythm, normal S1 and S2, no murmur/rub/gallop; No lower extremity edema; Peripheral pulses are 2+ bilaterally  ABDOMEN: Nontender to palpation, normoactive bowel sounds, no rebound/guarding; No hepatosplenomegaly  MUSCLOSKELETAL:  Normal gait; no clubbing or cyanosis of digits; no joint swelling or tenderness to palpation  PSYCH: A+O to person, place, and time; affect appropriate  NEUROLOGY: CN 2-12 are intact and symmetric; no gross sensory deficits;   SKIN: No rashes; no palpable lesions    LABS:                        11.5   4.78  )-----------( 288      ( 25 Dec 2024 04:40 )             33.7     12-25    144  |  107  |  6.5[L]  ----------------------------<  95  3.6   |  25.0  |  0.51    Ca    9.1      25 Dec 2024 04:40    TPro  6.0[L]  /  Alb  3.5  /  TBili  0.5  /  DBili  x   /  AST  18  /  ALT  25  /  AlkPhos  92  12-25          Urinalysis Basic - ( 25 Dec 2024 04:40 )    Color: x / Appearance: x / SG: x / pH: x  Gluc: 95 mg/dL / Ketone: x  / Bili: x / Urobili: x   Blood: x / Protein: x / Nitrite: x   Leuk Esterase: x / RBC: x / WBC x   Sq Epi: x / Non Sq Epi: x / Bacteria: x        CAPILLARY BLOOD GLUCOSE

## 2024-12-26 NOTE — BH CONSULTATION LIAISON PROGRESS NOTE - NSBHASSESSMENTFT_PSY_ALL_CORE
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 Psychiatry was consulted for agitation.     Patient continues to be restless in bed and require frequent reorientation. He has had 24 hours without IM Zyprexa. Would continue Depakote at this time and get a trough level tomorrow morning prior to am dose. If patient does become increasingly agitated would recommend 2.5 mg Zyprexa IM prn q6 hours. Would only utilize Zyprexa if patient is becoming combative and is not redirectable. Patient does seem to require frequent reorientation. Would hold for oversedation.     RECS   - Continue Depakote sprinkles 250 mg BID, hold for sedation  * would recommend checking a trough blood level tomorrow 12/27   - PRN Zyprexa 2.5 mg IM q6 hours for acute agitation, if needed, hold for sedation   -Maintain delirium precautions   -Avoid anticholinergic agents, benzos, opioid as they can further perpetuate confusion   -Frequent re orientation, Hydration, try to avoid restraints and if possible, mobilize

## 2024-12-26 NOTE — BH CONSULTATION LIAISON PROGRESS NOTE - NSBHFUPINTERVALHXFT_PSY_A_CORE
Patient resting in bed this morning, shouting. He continues to state he wants to get out of here, and appears to be confused this morning. He is reoriented that he is in the hospital. He appears restless in bed. Denies SI/HI. Per nursing, patient has been restless all morning, grabbing the hand rail.

## 2024-12-27 ENCOUNTER — TRANSCRIPTION ENCOUNTER (OUTPATIENT)
Age: 69
End: 2024-12-27

## 2024-12-27 LAB
ALBUMIN SERPL ELPH-MCNC: 3.1 G/DL — LOW (ref 3.3–5.2)
ALP SERPL-CCNC: 90 U/L — SIGNIFICANT CHANGE UP (ref 40–120)
ALT FLD-CCNC: 19 U/L — SIGNIFICANT CHANGE UP
ANION GAP SERPL CALC-SCNC: 12 MMOL/L — SIGNIFICANT CHANGE UP (ref 5–17)
AST SERPL-CCNC: 14 U/L — SIGNIFICANT CHANGE UP
BILIRUB SERPL-MCNC: 0.4 MG/DL — SIGNIFICANT CHANGE UP (ref 0.4–2)
BUN SERPL-MCNC: 14.1 MG/DL — SIGNIFICANT CHANGE UP (ref 8–20)
CALCIUM SERPL-MCNC: 8.7 MG/DL — SIGNIFICANT CHANGE UP (ref 8.4–10.5)
CHLORIDE SERPL-SCNC: 104 MMOL/L — SIGNIFICANT CHANGE UP (ref 96–108)
CO2 SERPL-SCNC: 25 MMOL/L — SIGNIFICANT CHANGE UP (ref 22–29)
CREAT SERPL-MCNC: 0.54 MG/DL — SIGNIFICANT CHANGE UP (ref 0.5–1.3)
EGFR: 109 ML/MIN/1.73M2 — SIGNIFICANT CHANGE UP
GLUCOSE SERPL-MCNC: 81 MG/DL — SIGNIFICANT CHANGE UP (ref 70–99)
HCT VFR BLD CALC: 35.6 % — LOW (ref 39–50)
HGB BLD-MCNC: 11.6 G/DL — LOW (ref 13–17)
MCHC RBC-ENTMCNC: 29.9 PG — SIGNIFICANT CHANGE UP (ref 27–34)
MCHC RBC-ENTMCNC: 32.6 G/DL — SIGNIFICANT CHANGE UP (ref 32–36)
MCV RBC AUTO: 91.8 FL — SIGNIFICANT CHANGE UP (ref 80–100)
PLATELET # BLD AUTO: 279 K/UL — SIGNIFICANT CHANGE UP (ref 150–400)
POTASSIUM SERPL-MCNC: 3.8 MMOL/L — SIGNIFICANT CHANGE UP (ref 3.5–5.3)
POTASSIUM SERPL-SCNC: 3.8 MMOL/L — SIGNIFICANT CHANGE UP (ref 3.5–5.3)
PROT SERPL-MCNC: 5.8 G/DL — LOW (ref 6.6–8.7)
RBC # BLD: 3.88 M/UL — LOW (ref 4.2–5.8)
RBC # FLD: 15.4 % — HIGH (ref 10.3–14.5)
SODIUM SERPL-SCNC: 141 MMOL/L — SIGNIFICANT CHANGE UP (ref 135–145)
VALPROATE SERPL-MCNC: 28.6 UG/ML — LOW (ref 50–100)
WBC # BLD: 4.36 K/UL — SIGNIFICANT CHANGE UP (ref 3.8–10.5)
WBC # FLD AUTO: 4.36 K/UL — SIGNIFICANT CHANGE UP (ref 3.8–10.5)

## 2024-12-27 PROCEDURE — 99232 SBSQ HOSP IP/OBS MODERATE 35: CPT

## 2024-12-27 RX ORDER — DRONABINOL 5 MG/ML
5 SOLUTION ORAL DAILY
Refills: 0 | Status: DISCONTINUED | OUTPATIENT
Start: 2024-12-27 | End: 2024-12-31

## 2024-12-27 RX ORDER — SIMETHICONE 125 MG/1
80 CAPSULE, LIQUID FILLED ORAL DAILY
Refills: 0 | Status: DISCONTINUED | OUTPATIENT
Start: 2024-12-27 | End: 2024-12-31

## 2024-12-27 RX ADMIN — DRONABINOL 5 MILLIGRAM(S): 5 SOLUTION ORAL at 17:16

## 2024-12-27 RX ADMIN — HEPARIN SODIUM 5000 UNIT(S): 1000 INJECTION, SOLUTION INTRAVENOUS; SUBCUTANEOUS at 17:16

## 2024-12-27 RX ADMIN — HEPARIN SODIUM 5000 UNIT(S): 1000 INJECTION, SOLUTION INTRAVENOUS; SUBCUTANEOUS at 05:31

## 2024-12-27 RX ADMIN — DIVALPROEX SODIUM 250 MILLIGRAM(S): 500 TABLET, DELAYED RELEASE ORAL at 05:30

## 2024-12-27 RX ADMIN — TICAGRELOR 90 MILLIGRAM(S): 60 TABLET ORAL at 05:30

## 2024-12-27 RX ADMIN — DIVALPROEX SODIUM 250 MILLIGRAM(S): 500 TABLET, DELAYED RELEASE ORAL at 17:16

## 2024-12-27 RX ADMIN — CHLORHEXIDINE GLUCONATE 1 APPLICATION(S): 1.2 RINSE ORAL at 05:31

## 2024-12-27 RX ADMIN — LOSARTAN POTASSIUM 25 MILLIGRAM(S): 100 TABLET, FILM COATED ORAL at 05:30

## 2024-12-27 RX ADMIN — Medication 81 MILLIGRAM(S): at 17:16

## 2024-12-27 RX ADMIN — TICAGRELOR 90 MILLIGRAM(S): 60 TABLET ORAL at 17:16

## 2024-12-27 NOTE — BH CONSULTATION LIAISON PROGRESS NOTE - NSBHCHARTREVIEWLAB_PSY_A_CORE FT
11.6   4.36  )-----------( 279      ( 27 Dec 2024 06:00 )             35.6       12-27    141  |  104  |  14.1  ----------------------------<  81  3.8   |  25.0  |  0.54    Ca    8.7      27 Dec 2024 06:00    TPro  5.8[L]  /  Alb  3.1[L]  /  TBili  0.4  /  DBili  x   /  AST  14  /  ALT  19  /  AlkPhos  90  12-27                  
                      11.1   4.40  )-----------( 277      ( 24 Dec 2024 05:30 )             32.0       12-24    139  |  105  |  5.1[L]  ----------------------------<  92  3.4[L]   |  25.0  |  0.51    Ca    9.0      24 Dec 2024 05:30    TPro  5.6[L]  /  Alb  3.4  /  TBili  0.4  /  DBili  x   /  AST  19  /  ALT  27  /  AlkPhos  83  12-24                  
                      11.5   4.78  )-----------( 288      ( 25 Dec 2024 04:40 )             33.7       12-25    144  |  107  |  6.5[L]  ----------------------------<  95  3.6   |  25.0  |  0.51    Ca    9.1      25 Dec 2024 04:40    TPro  6.0[L]  /  Alb  3.5  /  TBili  0.5  /  DBili  x   /  AST  18  /  ALT  25  /  AlkPhos  92  12-25

## 2024-12-27 NOTE — DISCHARGE NOTE PROVIDER - CARE PROVIDERS DIRECT ADDRESSES
,[email or web address]

## 2024-12-27 NOTE — DISCHARGE NOTE PROVIDER - HOSPITAL COURSE
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 He had a recent admission was for mildly elevated troponin levels, failure to thrive, and altered mental status suspected to be due to worsening vascular dementia. He presented with reportedly developed new left-sided weakness, which has since improved. He has a right ocular implant (non-MR compatible). Head imaging demonstrates a possible enlarging right cerebellar subacute infarction compared to imaging from November. He was subsequently admitted to the Stroke service for rule out CVA. MRI was unable to be obtained due to ocular implant. Palliative was consulted for assistance with goals of care. SLP evaluated patient with recommendations for pureed diet. CTH was repeated today which showed "moderate to severe chronic microvascular changes without evidence of an acute transcortical infarction or hemorrhage." EEG without evidence of seizure like activity. Patient now being transferred from Neurology to Medicine. Patient with poor oral intake. Patients son aware of prognosis and want to ensure medical optimization before discharge to a new [facility] (they are not happy with previous [facility] care). From medical standpoint patient is ready for discharge to [facility].    #L sided weakness, hx of CVA w/ R sided weakness  - Neurology recs appreciated  - Unable to obtain MRI due to occular implant  - Repeat CTH without any new CVA  - EEG w/o seizure like activity  - Neuro checks  - Continue ASA  - cw Statin, Brillinta  - Telemetry  - patient will need careful monitoring of fluid status and nutritional intake, when discharged should have chemistry closely monitored to prevent dehydration or electrolyte imbalances  -cw depakote 250mg q12hrs - check level on 12/28    #Acute metabolic encephelopathy   #Central dysphagia  - likely in setting of Vascular dementia with behavioral changes with recent FTT  - Palliative following for assistance with goals of care  - SLP following,cleared for pureed diet with moderately thick liquid. they will follow to advance diet  - cw Dronabinol  -Hold Seroquel, Sertraline, Mirtazapine as mental status waxing and weaning and some somnolance  - EEG suggestive of focal and generalized slowing  - Delirium precautions  - family does not want any use of benzos or opioids, ok w low dose zyprexa for agitation that is not verbally redirectable  - patient will need careful monitoring of fluid status and nutritional intake, when discharged should have chemistry closely monitored to prevent dehydration or electrolyte imbalances    Elevated troponin  - appears in no distress  - Trop: 36 -> 40 -> 52 -> 55, continue to trend  - Repeat EKG  - Telemetry  - Troponin was elevated to the 50s on prior admission, he was seen by Cardiology and TTE was unremarkable. Patient was deemed not a candidate for stress testing or invasive procedure. 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 He had a recent admission was for mildly elevated troponin levels, failure to thrive, and altered mental status suspected to be due to worsening vascular dementia. He presented with reportedly developed new left-sided weakness, which has since improved. He has a right ocular implant (non-MR compatible). Head imaging demonstrates a possible enlarging right cerebellar subacute infarction compared to imaging from November. He was subsequently admitted to the Stroke service for rule out CVA. MRI was unable to be obtained due to ocular implant. Palliative was consulted for assistance with goals of care. SLP evaluated patient with recommendations for pureed diet. CTH was repeated today which showed "moderate to severe chronic microvascular changes without evidence of an acute transcortical infarction or hemorrhage." EEG without evidence of seizure like activity. Patient now being transferred from Neurology to Medicine. Patient with poor oral intake. Patients son aware of prognosis and want to ensure medical optimization before discharge to a new [facility] (they are not happy with previous [facility] care). From medical standpoint patient is ready for discharge to [facility].        #L sided weakness, hx of CVA w/ R sided weakness  - Repeat CTH without any new CVA  - Unable to obtain MRI due to occular implant  - EEG w/o seizure like activity  - Neuro checks  - Continue ASA,Statin, Brillinta  - Neurology recs appreciated        #Acute metabolic encephelopathy /agitation  # dysphagia  - likely in setting of Vascular dementia with behavioral changes with recent FTT  -cth neg  -cw depakote 250mg q12hrs - check level on 12/28  - SLP following,cleared for pureed diet with moderately thick liquid.  - cw Dronabinol  -Hold Seroquel, Sertraline, Mirtazapine as mental status waxing and weaning and some somnolance  - EEG suggestive of focal and generalized slowing.no seizure   - family does not want any use of benzos or opioids, ok w low dose zyprex for agitation   -Risk of dehydration/electrolytes imbalance from poor po intake .  -prn zyprexa for agitation.tachy for agitation  -Monitor lytes      Elevated troponin likely demand ischemia  - appears in no distress  - Trop: 36 -> 40 -> 52 -> 55, continue to trend  - Repeat EKG  - Telemetry  - Troponin was elevated to the 50s on prior admission, he was seen by Cardiology and TTE was unremarkable. Patient was deemed not a candidate for stress testing or invasive procedure.     Bradycardia  -Hold metoprolol  -EKG shows sinus bradycardia

## 2024-12-27 NOTE — BH CONSULTATION LIAISON PROGRESS NOTE - NSBHCHARTREVIEWVS_PSY_A_CORE FT
Vital Signs Last 24 Hrs  T(C): 36.8 (26 Dec 2024 06:20), Max: 36.9 (26 Dec 2024 00:20)  T(F): 98.2 (26 Dec 2024 06:20), Max: 98.4 (26 Dec 2024 00:20)  HR: 64 (26 Dec 2024 06:20) (64 - 88)  BP: 167/110 (26 Dec 2024 06:20) (131/82 - 167/110)  BP(mean): 98 (25 Dec 2024 16:44) (98 - 98)  RR: 19 (26 Dec 2024 06:20) (17 - 19)  SpO2: 96% (26 Dec 2024 06:20) (96% - 99%)    Parameters below as of 26 Dec 2024 06:20  Patient On (Oxygen Delivery Method): room air    
Vital Signs Last 24 Hrs  T(C): 36.7 (24 Dec 2024 08:44), Max: 36.8 (24 Dec 2024 01:05)  T(F): 98 (24 Dec 2024 08:44), Max: 98.2 (24 Dec 2024 01:05)  HR: 64 (24 Dec 2024 08:44) (60 - 78)  BP: 166/86 (24 Dec 2024 08:44) (140/80 - 166/86)  BP(mean): --  RR: 19 (24 Dec 2024 08:44) (18 - 19)  SpO2: 98% (24 Dec 2024 08:44) (96% - 100%)    Parameters below as of 24 Dec 2024 08:44  Patient On (Oxygen Delivery Method): room air    
Vital Signs Last 24 Hrs  T(C): 36.7 (27 Dec 2024 07:32), Max: 36.8 (27 Dec 2024 05:37)  T(F): 98.1 (27 Dec 2024 07:32), Max: 98.3 (27 Dec 2024 05:37)  HR: 80 (27 Dec 2024 07:32) (74 - 84)  BP: 148/86 (27 Dec 2024 07:32) (119/81 - 148/86)  BP(mean): --  RR: 17 (27 Dec 2024 07:32) (16 - 18)  SpO2: 96% (27 Dec 2024 07:32) (94% - 99%)    Parameters below as of 27 Dec 2024 07:32  Patient On (Oxygen Delivery Method): room air

## 2024-12-27 NOTE — BH CONSULTATION LIAISON PROGRESS NOTE - NSBHFUPINTERVALHXFT_PSY_A_CORE
Patient resting in bed this morning, shouting. He continues to state he wants to get out of here, and appears to be confused this morning. He is reoriented that he is in the hospital. He appears restless in bed. Denies SI/HI. Per nursing, patient has been restless all morning, grabbing the hand rail.   Patient resting in bed this morning, with head elevated. He is calm and cooperative this am. He reports he had a stomach ache this morning, but otherwise is stable. He reports mood is okay, but otherwise has no acute complaints. Is medications compliant. Per nursing, patient does require frequent reorientation but is otherwise calm and cooperative.

## 2024-12-27 NOTE — CHART NOTE - NSCHARTNOTEFT_GEN_A_CORE
Source: Staff, chart    Current Diet: Diet, Pureed:   Moderately Thick Liquids (MODTHICKLIQS)  Supplement Feeding Modality:  Oral  Ensure Pudding Cans or Servings Per Day:  3       Frequency:  Three Times a day (12-20-24 @ 15:39) [Active]    PO intake:  %      Source for PO intake: Staff, chart    Current Weight:   12/17 175.4 lbs  12/20 168.7 lbs  12/25 166.6 lbs    Pertinent Medications: MEDICATIONS  (STANDING):  divalproex Sprinkle 250 milliGRAM(s) Oral two times a day  losartan 25 milliGRAM(s) Oral daily  simethicone 80 milliGRAM(s) Chew daily  ticagrelor 90 milliGRAM(s) Oral two times a day    MEDICATIONS  (PRN):  ondansetron Injectable 4 milliGRAM(s) IV Push every 8 hours PRN Nausea and/or Vomiting    Pertinent Labs: 12-27 Na141 mmol/L Glu 81 mg/dL K+ 3.8 mmol/L Cr  0.54 mg/dL BUN 14.1 mg/dL Alb 3.1 g/dL[L]       Nutrition focused physical exam conducted previously with signs of malnutrition present    Subcutaneous fat loss: [x] Orbital fat pads region, [ ]Buccal fat region, [ ]Triceps region,  [ ]Ribs region    Muscle wasting: [x]Temples region, [x]Clavicle region, [x]Shoulder region, [ ]Scapula region, [ ]Interosseous region,  [ ]thigh region, [x]Calf region    Estimated Needs:   7605-9615 kcal (25-30 kcal/kg 76.5kg)  77-92g protein (1-1.2g/kg 76.5kg)    Hospital Course: 69 year old male with a past medical history of a 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 He had a recent admission was for mildly elevated troponin levels, failure to thrive, and altered mental status suspected to be due to worsening vascular dementia. He presented with reportedly developed new left-sided weakness, which has since improved. He has a right ocular implant (non-MR compatible). Head imaging demonstrates a possible enlarging right cerebellar subacute infarction compared to imaging from November. He was subsequently admitted to the Stroke service for rule out CVA. MRI was unable to be obtained due to ocular implant. Palliative was consulted for assistance with goals of care. SLP evaluated patient with recommendations for pureed diet. CTH was repeated today which showed "moderate to severe chronic microvascular changes without evidence of an acute transcortical infarction or hemorrhage." EEG without evidence of seizure like activity. Patient now being transferred from Neurology to Medicine. Patient with poor oral intake. Patients son aware of prognosis and want to ensure medical optimization before discharge to a new [facility] (they are not happy with previous [facility] care).    Current Nutrition Diagnosis: Chronic moderate malnutrition related to inability to meet sufficient protein-energy in the setting of hx malnutrition, paralysis, CVA as evidenced by mild muscle/fat wasting.    Patient A/OX3 with garbled speech. Per RN pt tolerating current diet well with good appetite/PO intake. Pt ate about 75% of breakfast meal this AM. No reported c/o N/V/C at this time. PT c/o stomach ache this AM and then had one episode of diarrhea. Continue to monitor BMs and if diarrhea continues, consider addition of Banatrol BID to bulk stool. Per AM rounds, pt awaiting placement. Will continue to monitor and follow up as needed. RD remains available.     Recommendations:   1) Continue diet as tolerated per SLP recommendation.   2) Ensure Pudding no longer made by , change to Ensure - kitchen staff will thicken appropriately.   3) Encourage po intake, monitor diet tolerance, and provide assistance at meals as needed.   4) Rx: MVI daily.   5) Obtain weekly weights to monitor trends.     Monitoring and Evaluation:   [x] PO intake [x] Tolerance to diet prescription [X] Weights  [X] Follow up per protocol [X] Labs: chem 8

## 2024-12-27 NOTE — PROGRESS NOTE ADULT - SUBJECTIVE AND OBJECTIVE BOX
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 He had a recent admission was for mildly elevated troponin levels, failure to thrive, and altered mental status suspected to be due to worsening vascular dementia. He presented with reportedly developed new left-sided weakness, which has since improved. He has a right ocular implant (non-MR compatible). Head imaging demonstrates a possible enlarging right cerebellar subacute infarction compared to imaging from November. He was subsequently admitted to the Stroke service for rule out CVA. MRI was unable to be obtained due to ocular implant. Palliative was consulted for assistance with goals of care. SLP evaluated patient with recommendations for pureed diet. CTH was repeated today which showed "moderate to severe chronic microvascular changes without evidence of an acute transcortical infarction or hemorrhage." EEG without evidence of seizure like activity. Patient now being transferred from Neurology to Medicine. Patient with poor oral intake. Patients son aware of prognosis and want to ensure medical optimization before discharge to a new [facility] (they are not happy with previous [facility] care). From medical standpoint patient is ready for discharge to [facility].    #L sided weakness, hx of CVA w/ R sided weakness  - Neurology recs appreciated  - Unable to obtain MRI due to occular implant  - Repeat CTH without any new CVA  - EEG w/o seizure like activity  - Neuro checks  - Continue ASA  - cw Statin, Brillinta  - Telemetry  - patient will need careful monitoring of fluid status and nutritional intake, when discharged should have chemistry closely monitored to prevent dehydration or electrolyte imbalances  -cw depakote 250mg q12hrs - check level on 12/28    #Acute metabolic encephelopathy   #Central dysphagia  - likely in setting of Vascular dementia with behavioral changes with recent FTT  - Palliative following for assistance with goals of care  - SLP following,cleared for pureed diet with moderately thick liquid. they will follow to advance diet  - cw Dronabinol  -Hold Seroquel, Sertraline, Mirtazapine as mental status waxing and weaning and some somnolance  - EEG suggestive of focal and generalized slowing  - Delirium precautions  - family does not want any use of benzos or opioids, ok w low dose zyprexa for agitation that is not verbally redirectable  - patient will need careful monitoring of fluid status and nutritional intake, when discharged should have chemistry closely monitored to prevent dehydration or electrolyte imbalances    Elevated troponin  - appears in no distress  - Trop: 36 -> 40 -> 52 -> 55, continue to trend  - Repeat EKG  - Telemetry  - Troponin was elevated to the 50s on prior admission, he was seen by Cardiology and TTE was unremarkable. Patient was deemed not a candidate for stress testing or invasive procedure. Unlikely NSTEMI per Cardiology    Bradycardia  -Hold metoprolol  -EKG shows sinus bradycardia    Advance Directives: Full code, Palliative following    DVT ppx: Heparin SQ  Diet: pureed diet  Dispo:  will need placement to [facility] (family still choosing) - patient medically ready    [facility]      SUBJECTIVE / OVERNIGHT EVENTS:    pt seen and examined at bedside  juan  pt calm no complaints  tolerating pureed diet  normal stooling and urination    son velma de la garza updated - pt medically ready for [facility] placement -- behavior controlled with depakote, normal vital signs, labs without signs of dehydration    MEDICATIONS  (STANDING):  aspirin  chewable 81 milliGRAM(s) Oral daily  chlorhexidine 2% Cloths 1 Application(s) Topical <User Schedule>  divalproex Sprinkle 250 milliGRAM(s) Oral two times a day  heparin   Injectable 5000 Unit(s) SubCutaneous every 12 hours  losartan 25 milliGRAM(s) Oral daily  simethicone 80 milliGRAM(s) Chew daily  ticagrelor 90 milliGRAM(s) Oral two times a day    MEDICATIONS  (PRN):  hydrALAZINE Injectable 10 milliGRAM(s) IV Push every 6 hours PRN SBP >160  OLANZapine Injectable 2.5 milliGRAM(s) IntraMuscular every 6 hours PRN acute agitation, if needed, hold for sedation  ondansetron Injectable 4 milliGRAM(s) IV Push every 8 hours PRN Nausea and/or Vomiting        I&O's Summary    26 Dec 2024 07:01  -  27 Dec 2024 07:00  --------------------------------------------------------  IN: 0 mL / OUT: 800 mL / NET: -800 mL    27 Dec 2024 07:01  -  27 Dec 2024 11:01  --------------------------------------------------------  IN: 200 mL / OUT: 0 mL / NET: 200 mL        PHYSICAL EXAM:  Vital Signs Last 24 Hrs  T(C): 36.7 (27 Dec 2024 07:32), Max: 36.8 (27 Dec 2024 05:37)  T(F): 98.1 (27 Dec 2024 07:32), Max: 98.3 (27 Dec 2024 05:37)  HR: 80 (27 Dec 2024 07:32) (74 - 84)  BP: 148/86 (27 Dec 2024 07:32) (119/81 - 148/86)  BP(mean): --  RR: 17 (27 Dec 2024 07:32) (16 - 18)  SpO2: 96% (27 Dec 2024 07:32) (94% - 99%)    Parameters below as of 27 Dec 2024 07:32  Patient On (Oxygen Delivery Method): room air            CONSTITUTIONAL: NAD, well-groomed  ENMT: Moist oral mucosa, no pharyngeal injection or exudates; normal dentition  RESPIRATORY: Normal respiratory effort; lungs are clear to auscultation bilaterally  CARDIOVASCULAR: Regular rate and rhythm, normal S1 and S2, no murmur/rub/gallop; No lower extremity edema; Peripheral pulses are 2+ bilaterally  ABDOMEN: Nontender to palpation, normoactive bowel sounds, no rebound/guarding; No hepatosplenomegaly  MUSCLOSKELETAL:  Normal gait; no clubbing or cyanosis of digits; no joint swelling or tenderness to palpation  PSYCH: A+O to person, place, and time; affect appropriate  NEUROLOGY: CN 2-12 are intact and symmetric; no gross sensory deficits;   SKIN: No rashes; no palpable lesions    LABS:                        11.6   4.36  )-----------( 279      ( 27 Dec 2024 06:00 )             35.6     12-27    141  |  104  |  14.1  ----------------------------<  81  3.8   |  25.0  |  0.54    Ca    8.7      27 Dec 2024 06:00    TPro  5.8[L]  /  Alb  3.1[L]  /  TBili  0.4  /  DBili  x   /  AST  14  /  ALT  19  /  AlkPhos  90  12-27          Urinalysis Basic - ( 27 Dec 2024 06:00 )    Color: x / Appearance: x / SG: x / pH: x  Gluc: 81 mg/dL / Ketone: x  / Bili: x / Urobili: x   Blood: x / Protein: x / Nitrite: x   Leuk Esterase: x / RBC: x / WBC x   Sq Epi: x / Non Sq Epi: x / Bacteria: x        CAPILLARY BLOOD GLUCOSE            RADIOLOGY & ADDITIONAL TESTS:  Results Reviewed:   Imaging Personally Reviewed:  Electrocardiogram Personally Reviewed:

## 2024-12-27 NOTE — BH CONSULTATION LIAISON PROGRESS NOTE - NSBHASSESSMENTFT_PSY_ALL_CORE
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 Psychiatry was consulted for agitation.     Patient appears to be more engaging today. He does become frustrated easily at times as he has difficulty speaking and articulating his thoughts. He has remained calm and cooperative with team and nursing this morning and over the past 24 hours. Does continues to require frequent reorientation. Would continue Depakote at this time and get a trough level tomorrow morning prior to am dose. If patient does become increasingly agitated would recommend 2.5 mg Zyprexa IM prn q6 hours. Would only utilize Zyprexa if patient is becoming combative and is not redirectable. Would hold for oversedation.     RECS   - Continue Depakote sprinkles 250 mg BID, hold for sedation  * would recommend checking a trough blood level tomorrow am  - PRN Zyprexa 2.5 mg IM q6 hours for acute agitation, if needed, hold for sedation   -Maintain delirium precautions   -Avoid anticholinergic agents, benzos, opioid as they can further perpetuate confusion   -Frequent re orientation, Hydration, try to avoid restraints and if possible, mobilize

## 2024-12-27 NOTE — DISCHARGE NOTE PROVIDER - NSDCMRMEDTOKEN_GEN_ALL_CORE_FT
acetaminophen 325 mg oral tablet: 2 tab(s) orally every 6 hours As needed Temp greater or equal to 38C (100.4F), Mild Pain (1 - 3)  aluminum hydroxide-magnesium hydroxide 200 mg-200 mg/5 mL oral suspension: 30 milliliter(s) orally every 4 hours As needed Dyspepsia  aspirin 81 mg oral capsule: 1 cap(s) orally once a day  baclofen 5 mg oral tablet: 1 tab(s) orally 2 times a day  Brilinta (ticagrelor) 90 mg oral tablet: 1 tab(s) orally 2 times a day  cephalexin 500 mg oral capsule: 1 cap(s) orally 4 times a day  Lipitor 40 mg oral tablet: 1 tab(s) orally once a day (at bedtime)  losartan 25 mg oral tablet: 1 tab(s) orally once a day  Marinol 2.5 mg oral capsule: 1 cap(s) orally 2 times a day  melatonin 3 mg oral tablet: 1 tab(s) orally once a day (at bedtime) As needed Insomnia  metoprolol succinate 25 mg oral tablet, extended release: 1 tab(s) orally once a day  mirtazapine 7.5 mg oral tablet: 1 tab(s) orally once a day  pantoprazole 40 mg oral delayed release tablet: 1 tab(s) orally once a day  Pepcid 20 mg oral tablet: 1 tab(s) orally 2 times a day  QUEtiapine 25 mg oral tablet: 1 tab(s) orally once a day (at bedtime)  sertraline 50 mg oral tablet: 1 tab(s) orally once a day   acetaminophen 325 mg oral tablet: 2 tab(s) orally every 6 hours As needed Temp greater or equal to 38C (100.4F), Mild Pain (1 - 3)  aspirin 81 mg oral tablet, chewable: 1 tab(s) orally once a day  atorvastatin 40 mg oral tablet: 1 tab(s) orally once a day  divalproex sodium 125 mg oral delayed release capsule: 2 cap(s) orally 2 times a day  droNABinol 5 mg oral capsule: 1 cap(s) orally once a day  heparin 5000 units/0.5 mL injectable solution: 5,000 milligram(s) subcutaneous every 12 hours  losartan 25 mg oral tablet: 1 tab(s) orally once a day  OLANZapine 2.5 mg oral tablet: 1 tab(s) orally 2 times a day as needed for  agitation  simethicone 80 mg oral tablet, chewable: 1 tab(s) orally once a day  ticagrelor 90 mg oral tablet: 1 tab(s) orally 2 times a day

## 2024-12-27 NOTE — PROGRESS NOTE ADULT - ASSESSMENT
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 after a mechanical fall. He had a recent admission was for mildly elevated troponin levels, failure to thrive, and altered mental status suspected to be due to worsening vascular dementia. He presented with reportedly developed new left-sided weakness, which has since improved. He has a right ocular implant (non-MR compatible). Head imaging demonstrates a possible enlarging right cerebellar subacute infarction compared to imaging from November. He was subsequently admitted to the Stroke service for rule out CVA. MRI was unable to be obtained due to ocular implant. Palliative was consulted for assistance with goals of care. SLP evaluated patient with recommendations for pureed diet. CTH was repeated today which showed "moderate to severe chronic microvascular changes without evidence of an acute transcortical infarction or hemorrhage." EEG without evidence of seizure like activity. Patient now being transferred from Neurology to Medicine. Patient with poor oral intake. Patients son aware of prognosis and want to ensure medical optimization before discharge to a new [facility] (they are not happy with previous [facility] care). From medical standpoint patient is ready for discharge to [facility].    #L sided weakness, hx of CVA w/ R sided weakness  - Neurology recs appreciated  - Unable to obtain MRI due to occular implant  - Repeat CTH without any new CVA  - EEG w/o seizure like activity  - Neuro checks  - Continue ASA  - cw Statin, Brillinta  - Telemetry  - patient will need careful monitoring of fluid status and nutritional intake, when discharged should have chemistry closely monitored to prevent dehydration or electrolyte imbalances  -cw depakote 250mg q12hrs - check level on 12/28    #Acute metabolic encephelopathy   #Central dysphagia  - likely in setting of Vascular dementia with behavioral changes with recent FTT  - Palliative following for assistance with goals of care  - SLP following,cleared for pureed diet with moderately thick liquid. they will follow to advance diet  - cw Dronabinol  -Hold Seroquel, Sertraline, Mirtazapine as mental status waxing and weaning and some somnolance  - EEG suggestive of focal and generalized slowing  - Delirium precautions  - family does not want any use of benzos or opioids, ok w low dose zyprexa for agitation that is not verbally redirectable  - patient will need careful monitoring of fluid status and nutritional intake, when discharged should have chemistry closely monitored to prevent dehydration or electrolyte imbalances    Elevated troponin  - appears in no distress  - Trop: 36 -> 40 -> 52 -> 55, continue to trend  - Repeat EKG  - Telemetry  - Troponin was elevated to the 50s on prior admission, he was seen by Cardiology and TTE was unremarkable. Patient was deemed not a candidate for stress testing or invasive procedure. Unlikely NSTEMI per Cardiology    Bradycardia  -Hold metoprolol  -EKG shows sinus bradycardia    Advance Directives: Full code, Palliative following    DVT ppx: Heparin SQ  Diet: pureed diet  Dispo:  will need placement to [facility] (family still choosing) - patient medically ready

## 2024-12-27 NOTE — BH CONSULTATION LIAISON PROGRESS NOTE - NSICDXBHPRIMARYDX_PSY_ALL_CORE
Delirium superimposed on dementia   F05  

## 2024-12-27 NOTE — BH CONSULTATION LIAISON PROGRESS NOTE - CURRENT MEDICATION
MEDICATIONS  (STANDING):  aspirin  chewable 81 milliGRAM(s) Oral daily  chlorhexidine 2% Cloths 1 Application(s) Topical <User Schedule>  dextrose 5% + sodium chloride 0.9%. 1000 milliLiter(s) (75 mL/Hr) IV Continuous <Continuous>  divalproex Sprinkle 250 milliGRAM(s) Oral two times a day  dronabinol 2.5 milliGRAM(s) Oral two times a day  heparin   Injectable 5000 Unit(s) SubCutaneous every 12 hours  losartan 25 milliGRAM(s) Oral daily  ticagrelor 90 milliGRAM(s) Oral two times a day    MEDICATIONS  (PRN):  hydrALAZINE Injectable 10 milliGRAM(s) IV Push every 6 hours PRN SBP >160  OLANZapine Injectable 5 milliGRAM(s) IntraMuscular every 8 hours PRN severe agitation  ondansetron Injectable 4 milliGRAM(s) IV Push every 8 hours PRN Nausea and/or Vomiting  
MEDICATIONS  (STANDING):  acetaminophen   IVPB .. 1000 milliGRAM(s) IV Intermittent once  aspirin  chewable 81 milliGRAM(s) Oral daily  chlorhexidine 2% Cloths 1 Application(s) Topical <User Schedule>  dextrose 5% + sodium chloride 0.9%. 1000 milliLiter(s) (75 mL/Hr) IV Continuous <Continuous>  divalproex Sprinkle 250 milliGRAM(s) Oral two times a day  dronabinol 2.5 milliGRAM(s) Oral two times a day  heparin   Injectable 5000 Unit(s) SubCutaneous every 12 hours  losartan 25 milliGRAM(s) Oral daily  ticagrelor 90 milliGRAM(s) Oral two times a day    MEDICATIONS  (PRN):  hydrALAZINE Injectable 10 milliGRAM(s) IV Push every 6 hours PRN SBP >160  OLANZapine Injectable 2.5 milliGRAM(s) IntraMuscular every 6 hours PRN acute agitation, if needed, hold for sedation  ondansetron Injectable 4 milliGRAM(s) IV Push every 8 hours PRN Nausea and/or Vomiting  
MEDICATIONS  (STANDING):  aspirin  chewable 81 milliGRAM(s) Oral daily  chlorhexidine 2% Cloths 1 Application(s) Topical <User Schedule>  divalproex Sprinkle 250 milliGRAM(s) Oral two times a day  heparin   Injectable 5000 Unit(s) SubCutaneous every 12 hours  losartan 25 milliGRAM(s) Oral daily  simethicone 80 milliGRAM(s) Chew daily  ticagrelor 90 milliGRAM(s) Oral two times a day    MEDICATIONS  (PRN):  hydrALAZINE Injectable 10 milliGRAM(s) IV Push every 6 hours PRN SBP >160  OLANZapine Injectable 2.5 milliGRAM(s) IntraMuscular every 6 hours PRN acute agitation, if needed, hold for sedation  ondansetron Injectable 4 milliGRAM(s) IV Push every 8 hours PRN Nausea and/or Vomiting

## 2024-12-27 NOTE — PROGRESS NOTE ADULT - TIME BILLING
Time spent reviewing the chart documentation, reviewing labs and imaging studies, evaluating the patient, discussing the plan of care with the consultants & medical team, and documenting.
For chart review, face to face evaluation, counselling and care coordination
Time spent reviewing the chart documentation, reviewing labs and imaging studies, evaluating the patient, discussing the plan of care with the consultants & medical team, and documenting.
For chart review, face to face evaluation, counselling and care coordination
Time spent reviewing the chart documentation, reviewing labs and imaging studies, evaluating the patient, discussing the plan of care with the consultants & medical team, and documenting.

## 2024-12-27 NOTE — DISCHARGE NOTE PROVIDER - NSDCCPCAREPLAN_GEN_ALL_CORE_FT
PRINCIPAL DISCHARGE DIAGNOSIS  Diagnosis: Expressive aphasia  Assessment and Plan of Treatment:       SECONDARY DISCHARGE DIAGNOSES  Diagnosis: Dysphagia  Assessment and Plan of Treatment:     Diagnosis: Dementia  Assessment and Plan of Treatment:     Diagnosis: Agitation  Assessment and Plan of Treatment:     Diagnosis: Bradycardia  Assessment and Plan of Treatment:

## 2024-12-27 NOTE — DISCHARGE NOTE PROVIDER - CARE PROVIDER_API CALL
Serjio Hansen  Cardiology  [address], [address]  [place] [address]  Phone: [phone]  Fax: [phone]  Follow Up Time: 2 weeks    Shadi Galvez  Neurology  [address] [facility] [address]  [place] [address]  Phone: [phone]  Fax: [phone]  Follow Up Time: 2 weeks

## 2024-12-27 NOTE — DISCHARGE NOTE PROVIDER - PROVIDER TOKENS
PROVIDER:[TOKEN:[[id number]],FOLLOWUP:[2 weeks]],PROVIDER:[TOKEN:[[id number]],FOLLOWUP:[2 weeks]]

## 2024-12-27 NOTE — BH CONSULTATION LIAISON PROGRESS NOTE - NSBHMSERELATED_PSY_A_CORE
Poor
Poor
[facility] [phone] Nurse to visit on the day following discharge. Other appropriate services to be arranged thereafter. Please contact the home care agency at the above phone number if you have not heard from them by approximately 12 noon on the day after your hospital discharge. 
Fair

## 2024-12-27 NOTE — DISCHARGE NOTE PROVIDER - ATTENDING DISCHARGE PHYSICAL EXAMINATION:
T(C): 36.7 (12-31-24 @ 05:26), Max: 36.7 (12-30-24 @ 22:13)  HR: 80 (12-31-24 @ 05:26) (80 - 150)  BP: 145/82 (12-31-24 @ 05:26) (114/76 - 161/87)  RR: 18 (12-31-24 @ 05:26) (16 - 18)  SpO2: 96% (12-31-24 @ 05:26) (95% - 98%)    GEN - NAD  HEENT - NCAT, EOMI  RESP - CTA BL, no wheeze/rhonchi/crackles. not on supplemental O2.  CARDIO - NS1S2, RRR. No murmurs  ABD - Soft/Non tender/Non distended. Normal BS x4 quadrants.   Ext - No CARI.  MSK - no joints swelling/tenderness  Neuro - awake,oriented to self,has confusion.moving exts. non coop with exam.does not follow commands  Psych- calm am

## 2024-12-28 LAB
ALBUMIN SERPL ELPH-MCNC: 3.6 G/DL — SIGNIFICANT CHANGE UP (ref 3.3–5.2)
ALP SERPL-CCNC: 93 U/L — SIGNIFICANT CHANGE UP (ref 40–120)
ALT FLD-CCNC: 19 U/L — SIGNIFICANT CHANGE UP
ANION GAP SERPL CALC-SCNC: 9 MMOL/L — SIGNIFICANT CHANGE UP (ref 5–17)
AST SERPL-CCNC: 20 U/L — SIGNIFICANT CHANGE UP
BILIRUB SERPL-MCNC: 0.4 MG/DL — SIGNIFICANT CHANGE UP (ref 0.4–2)
BUN SERPL-MCNC: 13.3 MG/DL — SIGNIFICANT CHANGE UP (ref 8–20)
CALCIUM SERPL-MCNC: 9.5 MG/DL — SIGNIFICANT CHANGE UP (ref 8.4–10.5)
CHLORIDE SERPL-SCNC: 105 MMOL/L — SIGNIFICANT CHANGE UP (ref 96–108)
CO2 SERPL-SCNC: 28 MMOL/L — SIGNIFICANT CHANGE UP (ref 22–29)
CREAT SERPL-MCNC: 0.54 MG/DL — SIGNIFICANT CHANGE UP (ref 0.5–1.3)
EGFR: 109 ML/MIN/1.73M2 — SIGNIFICANT CHANGE UP
GLUCOSE SERPL-MCNC: 92 MG/DL — SIGNIFICANT CHANGE UP (ref 70–99)
HCT VFR BLD CALC: 36 % — LOW (ref 39–50)
HGB BLD-MCNC: 11.8 G/DL — LOW (ref 13–17)
MCHC RBC-ENTMCNC: 29.9 PG — SIGNIFICANT CHANGE UP (ref 27–34)
MCHC RBC-ENTMCNC: 32.8 G/DL — SIGNIFICANT CHANGE UP (ref 32–36)
MCV RBC AUTO: 91.1 FL — SIGNIFICANT CHANGE UP (ref 80–100)
PLATELET # BLD AUTO: 307 K/UL — SIGNIFICANT CHANGE UP (ref 150–400)
POTASSIUM SERPL-MCNC: 4.7 MMOL/L — SIGNIFICANT CHANGE UP (ref 3.5–5.3)
POTASSIUM SERPL-SCNC: 4.7 MMOL/L — SIGNIFICANT CHANGE UP (ref 3.5–5.3)
PROT SERPL-MCNC: 6.2 G/DL — LOW (ref 6.6–8.7)
RBC # BLD: 3.95 M/UL — LOW (ref 4.2–5.8)
RBC # FLD: 15.1 % — HIGH (ref 10.3–14.5)
SODIUM SERPL-SCNC: 142 MMOL/L — SIGNIFICANT CHANGE UP (ref 135–145)
WBC # BLD: 5.2 K/UL — SIGNIFICANT CHANGE UP (ref 3.8–10.5)
WBC # FLD AUTO: 5.2 K/UL — SIGNIFICANT CHANGE UP (ref 3.8–10.5)

## 2024-12-28 PROCEDURE — 99233 SBSQ HOSP IP/OBS HIGH 50: CPT

## 2024-12-28 RX ADMIN — LOSARTAN POTASSIUM 25 MILLIGRAM(S): 100 TABLET, FILM COATED ORAL at 06:25

## 2024-12-28 RX ADMIN — HEPARIN SODIUM 5000 UNIT(S): 1000 INJECTION, SOLUTION INTRAVENOUS; SUBCUTANEOUS at 17:53

## 2024-12-28 RX ADMIN — TICAGRELOR 90 MILLIGRAM(S): 60 TABLET ORAL at 06:32

## 2024-12-28 RX ADMIN — CHLORHEXIDINE GLUCONATE 1 APPLICATION(S): 1.2 RINSE ORAL at 06:25

## 2024-12-28 RX ADMIN — HEPARIN SODIUM 5000 UNIT(S): 1000 INJECTION, SOLUTION INTRAVENOUS; SUBCUTANEOUS at 06:25

## 2024-12-28 RX ADMIN — SIMETHICONE 80 MILLIGRAM(S): 125 CAPSULE, LIQUID FILLED ORAL at 17:53

## 2024-12-28 RX ADMIN — TICAGRELOR 90 MILLIGRAM(S): 60 TABLET ORAL at 17:53

## 2024-12-28 RX ADMIN — DIVALPROEX SODIUM 250 MILLIGRAM(S): 500 TABLET, DELAYED RELEASE ORAL at 06:25

## 2024-12-28 RX ADMIN — DRONABINOL 5 MILLIGRAM(S): 5 SOLUTION ORAL at 17:52

## 2024-12-28 RX ADMIN — DIVALPROEX SODIUM 250 MILLIGRAM(S): 500 TABLET, DELAYED RELEASE ORAL at 17:52

## 2024-12-28 RX ADMIN — Medication 81 MILLIGRAM(S): at 17:53

## 2024-12-28 NOTE — PROGRESS NOTE ADULT - ASSESSMENT
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 He had a recent admission was for mildly elevated troponin levels, failure to thrive, and altered mental status suspected to be due to worsening vascular dementia. He presented with reportedly developed new left-sided weakness, which has since improved. He has a right ocular implant (non-MR compatible). Head imaging demonstrates a possible enlarging right cerebellar subacute infarction compared to imaging from November. He was subsequently admitted to the Stroke service for rule out CVA. MRI was unable to be obtained due to ocular implant. Palliative was consulted for assistance with goals of care. SLP evaluated patient with recommendations for pureed diet. CTH was repeated today which showed "moderate to severe chronic microvascular changes without evidence of an acute transcortical infarction or hemorrhage." EEG without evidence of seizure like activity. Patient now being transferred from Neurology to Medicine. Patient with poor oral intake. Patients son aware of prognosis and want to ensure medical optimization before discharge to a new [facility] (they are not happy with previous [facility] care). From medical standpoint patient is ready for discharge to [facility].    #L sided weakness, hx of CVA w/ R sided weakness  - Repeat CTH without any new CVA  - Unable to obtain MRI due to occular implant  - EEG w/o seizure like activity  - Neuro checks  - Continue ASA,Statin, Brillinta  - Neurology recs appreciated        #Acute metabolic encephelopathy   # dysphagia  - likely in setting of Vascular dementia with behavioral changes with recent FTT  -cth neg  -cw depakote 250mg q12hrs - check level on 12/28  - SLP following,cleared for pureed diet with moderately thick liquid.  - cw Dronabinol  -Hold Seroquel, Sertraline, Mirtazapine as mental status waxing and weaning and some somnolance  - EEG suggestive of focal and generalized slowing.no seizure   - family does not want any use of benzos or opioids, ok w low dose zyprex for agitation   -Risk of dehydration/electrolytes imbalance from poor po intake   -Monitor lytes      Elevated troponin likely demand ischemia  - appears in no distress  - Trop: 36 -> 40 -> 52 -> 55, continue to trend  - Repeat EKG  - Telemetry  - Troponin was elevated to the 50s on prior admission, he was seen by Cardiology and TTE was unremarkable. Patient was deemed not a candidate for stress testing or invasive procedure.     Bradycardia  -Hold metoprolol  -EKG shows sinus bradycardia    Advance Directives: Full code, Palliative following    DVT ppx: Heparin SQ  Diet: pureed diet  Dispo:  will need placement to [facility] (family still choosing place-still pending ) - patient medically stable    for safe discharge

## 2024-12-28 NOTE — PROGRESS NOTE ADULT - SUBJECTIVE AND OBJECTIVE BOX
Patient is a 69y old  Male who presents with a chief complaint of Aphasia     (20 Dec 2024 13:50)      Pt is awake. Not answering to quesions. Refusing to food/drinks. RN at bedside. Per RN,Pt has on/off confusion, was AAOX2 just few minutes ago. .   REVIEW OF SYSTEMS: unable    MEDICATIONS  (STANDING):  aspirin  chewable 81 milliGRAM(s) Oral daily  chlorhexidine 2% Cloths 1 Application(s) Topical <User Schedule>  divalproex Sprinkle 250 milliGRAM(s) Oral two times a day  dronabinol 5 milliGRAM(s) Oral daily  heparin   Injectable 5000 Unit(s) SubCutaneous every 12 hours  losartan 25 milliGRAM(s) Oral daily  simethicone 80 milliGRAM(s) Chew daily  ticagrelor 90 milliGRAM(s) Oral two times a day    MEDICATIONS  (PRN):  hydrALAZINE Injectable 10 milliGRAM(s) IV Push every 6 hours PRN SBP >160  OLANZapine Injectable 2.5 milliGRAM(s) IntraMuscular every 6 hours PRN acute agitation, if needed, hold for sedation  ondansetron Injectable 4 milliGRAM(s) IV Push every 8 hours PRN Nausea and/or Vomiting      CAPILLARY BLOOD GLUCOSE        I&O's Summary    27 Dec 2024 07:01  -  28 Dec 2024 07:00  --------------------------------------------------------  IN: 710 mL / OUT: 1 mL / NET: 709 mL        PHYSICAL EXAM:  Vital Signs Last 24 Hrs  T(C): 36.3 (28 Dec 2024 12:39), Max: 36.9 (28 Dec 2024 00:43)  T(F): 97.4 (28 Dec 2024 12:39), Max: 98.5 (28 Dec 2024 00:43)  HR: 77 (28 Dec 2024 12:39) (70 - 90)  BP: 150/91 (28 Dec 2024 12:39) (121/75 - 157/90)  BP(mean): --  RR: 17 (28 Dec 2024 12:39) (16 - 18)  SpO2: 97% (28 Dec 2024 12:39) (95% - 98%)    Parameters below as of 28 Dec 2024 12:39  Patient On (Oxygen Delivery Method): room air        CONSTITUTIONAL: NAD,  EYES:  conjunctiva and sclera clear  ENMT: refused  RESPIRATORY: Normal respiratory effort; lungs are clear to auscultation bilaterally  CARDIOVASCULAR: Regular rate and rhythm, normal S1 and S2, no murmur   EXTS: No lower extremity edema; Peripheral pulses are 2+ bilaterally  ABDOMEN: Nontender to palpation, normoactive bowel sounds, no rebound/guarding;   MUSCLOSKELETAL:   no joint swelling or tenderness to palpation  PSYCH: calm,non coop  NEUROLOGY: pt is awake, non coop.refusing exam. limited exam.      LABS:                        11.8   5.20  )-----------( 307      ( 28 Dec 2024 06:46 )             36.0     12-28    142  |  105  |  13.3  ----------------------------<  92  4.7   |  28.0  |  0.54    Ca    9.5      28 Dec 2024 06:46    TPro  6.2[L]  /  Alb  3.6  /  TBili  0.4  /  DBili  x   /  AST  20  /  ALT  19  /  AlkPhos  93  12-28          Urinalysis Basic - ( 28 Dec 2024 06:46 )    Color: x / Appearance: x / SG: x / pH: x  Gluc: 92 mg/dL / Ketone: x  / Bili: x / Urobili: x   Blood: x / Protein: x / Nitrite: x   Leuk Esterase: x / RBC: x / WBC x   Sq Epi: x / Non Sq Epi: x / Bacteria: x          RADIOLOGY & ADDITIONAL TESTS:  Results Reviewed:

## 2024-12-29 PROCEDURE — 99232 SBSQ HOSP IP/OBS MODERATE 35: CPT

## 2024-12-29 RX ORDER — ACETAMINOPHEN 80 MG/.8ML
650 SOLUTION/ DROPS ORAL EVERY 6 HOURS
Refills: 0 | Status: DISCONTINUED | OUTPATIENT
Start: 2024-12-29 | End: 2024-12-31

## 2024-12-29 RX ADMIN — DIVALPROEX SODIUM 250 MILLIGRAM(S): 500 TABLET, DELAYED RELEASE ORAL at 05:57

## 2024-12-29 RX ADMIN — Medication 81 MILLIGRAM(S): at 11:40

## 2024-12-29 RX ADMIN — HEPARIN SODIUM 5000 UNIT(S): 1000 INJECTION, SOLUTION INTRAVENOUS; SUBCUTANEOUS at 05:58

## 2024-12-29 RX ADMIN — HEPARIN SODIUM 5000 UNIT(S): 1000 INJECTION, SOLUTION INTRAVENOUS; SUBCUTANEOUS at 18:17

## 2024-12-29 RX ADMIN — ACETAMINOPHEN 650 MILLIGRAM(S): 80 SOLUTION/ DROPS ORAL at 18:43

## 2024-12-29 RX ADMIN — SIMETHICONE 80 MILLIGRAM(S): 125 CAPSULE, LIQUID FILLED ORAL at 11:40

## 2024-12-29 RX ADMIN — DIVALPROEX SODIUM 250 MILLIGRAM(S): 500 TABLET, DELAYED RELEASE ORAL at 18:16

## 2024-12-29 RX ADMIN — LOSARTAN POTASSIUM 25 MILLIGRAM(S): 100 TABLET, FILM COATED ORAL at 05:58

## 2024-12-29 RX ADMIN — CHLORHEXIDINE GLUCONATE 1 APPLICATION(S): 1.2 RINSE ORAL at 05:57

## 2024-12-29 RX ADMIN — ACETAMINOPHEN 650 MILLIGRAM(S): 80 SOLUTION/ DROPS ORAL at 12:43

## 2024-12-29 RX ADMIN — DRONABINOL 5 MILLIGRAM(S): 5 SOLUTION ORAL at 11:40

## 2024-12-29 RX ADMIN — TICAGRELOR 90 MILLIGRAM(S): 60 TABLET ORAL at 05:58

## 2024-12-29 RX ADMIN — TICAGRELOR 90 MILLIGRAM(S): 60 TABLET ORAL at 18:16

## 2024-12-29 NOTE — CHART NOTE - NSCHARTNOTEFT_GEN_A_CORE
Pt was c/o rt elbow pain per RN.Gave tylenol.  Pt is comfortable  Rt UE: No swelling,redness, non tender. Elbow/sholuder: Non tender,no swelling/redness. ROM WNL.

## 2024-12-29 NOTE — PROGRESS NOTE ADULT - SUBJECTIVE AND OBJECTIVE BOX
Patient is a 69y old  Male who presents with a chief complaint of Aphasia     (20 Dec 2024 13:50)      pt is awake. able to tell his name.He said he is doing well.  Does not answering to other questions.  REVIEW OF SYSTEMS: unable    MEDICATIONS  (STANDING):  aspirin  chewable 81 milliGRAM(s) Oral daily  chlorhexidine 2% Cloths 1 Application(s) Topical <User Schedule>  divalproex Sprinkle 250 milliGRAM(s) Oral two times a day  dronabinol 5 milliGRAM(s) Oral daily  heparin   Injectable 5000 Unit(s) SubCutaneous every 12 hours  losartan 25 milliGRAM(s) Oral daily  simethicone 80 milliGRAM(s) Chew daily  ticagrelor 90 milliGRAM(s) Oral two times a day    MEDICATIONS  (PRN):  hydrALAZINE Injectable 10 milliGRAM(s) IV Push every 6 hours PRN SBP >160  OLANZapine Injectable 2.5 milliGRAM(s) IntraMuscular every 6 hours PRN acute agitation, if needed, hold for sedation  ondansetron Injectable 4 milliGRAM(s) IV Push every 8 hours PRN Nausea and/or Vomiting      CAPILLARY BLOOD GLUCOSE        I&O's Summary    28 Dec 2024 07:01  -  29 Dec 2024 07:00  --------------------------------------------------------  IN: 250 mL / OUT: 0 mL / NET: 250 mL    29 Dec 2024 07:01  -  29 Dec 2024 11:48  --------------------------------------------------------  IN: 118 mL / OUT: 0 mL / NET: 118 mL        PHYSICAL EXAM:  Vital Signs Last 24 Hrs  T(C): 36.9 (29 Dec 2024 09:00), Max: 37 (29 Dec 2024 00:47)  T(F): 98.5 (29 Dec 2024 09:00), Max: 98.6 (29 Dec 2024 00:47)  HR: 82 (29 Dec 2024 09:00) (62 - 96)  BP: 134/83 (29 Dec 2024 09:00) (117/69 - 150/91)  BP(mean): --  RR: 18 (29 Dec 2024 09:00) (17 - 18)  SpO2: 98% (29 Dec 2024 09:00) (95% - 98%)    Parameters below as of 29 Dec 2024 05:54  Patient On (Oxygen Delivery Method): room air        CONSTITUTIONAL: NAD,  EYES: conjunctiva and sclera clear  ENMT: unable  RESPIRATORY: Normal respiratory effort; lungs are clear to auscultation bilaterally  CARDIOVASCULAR: Regular rate and rhythm, normal S1 and S2, no murmur   EXTS: No lower extremity edema; Peripheral pulses are 2+ bilaterally  ABDOMEN: Nontender to palpation, normoactive bowel sounds, no rebound/guarding;   MUSCLOSKELETAL:    no joint swelling or tenderness to palpation  PSYCH: calm.non coop  NEUROLOGY: Awake.oriented to self. moving exts. Limited exam. refusing exam.      LABS:                        11.8   5.20  )-----------( 307      ( 28 Dec 2024 06:46 )             36.0     12-28    142  |  105  |  13.3  ----------------------------<  92  4.7   |  28.0  |  0.54    Ca    9.5      28 Dec 2024 06:46    TPro  6.2[L]  /  Alb  3.6  /  TBili  0.4  /  DBili  x   /  AST  20  /  ALT  19  /  AlkPhos  93  12-28          Urinalysis Basic - ( 28 Dec 2024 06:46 )    Color: x / Appearance: x / SG: x / pH: x  Gluc: 92 mg/dL / Ketone: x  / Bili: x / Urobili: x   Blood: x / Protein: x / Nitrite: x   Leuk Esterase: x / RBC: x / WBC x   Sq Epi: x / Non Sq Epi: x / Bacteria: x          RADIOLOGY & ADDITIONAL TESTS:  Results Reviewed:

## 2024-12-29 NOTE — PROGRESS NOTE ADULT - ASSESSMENT
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 He had a recent admission was for mildly elevated troponin levels, failure to thrive, and altered mental status suspected to be due to worsening vascular dementia. He presented with reportedly developed new left-sided weakness, which has since improved. He has a right ocular implant (non-MR compatible). Head imaging demonstrates a possible enlarging right cerebellar subacute infarction compared to imaging from November. He was subsequently admitted to the Stroke service for rule out CVA. MRI was unable to be obtained due to ocular implant. Palliative was consulted for assistance with goals of care. SLP evaluated patient with recommendations for pureed diet. CTH was repeated today which showed "moderate to severe chronic microvascular changes without evidence of an acute transcortical infarction or hemorrhage." EEG without evidence of seizure like activity. Patient now being transferred from Neurology to Medicine. Patient with poor oral intake. Patients son aware of prognosis and want to ensure medical optimization before discharge to a new [facility] (they are not happy with previous [facility] care). From medical standpoint patient is ready for discharge to [facility].    #L sided weakness, hx of CVA w/ R sided weakness  - Repeat CTH without any new CVA  - Unable to obtain MRI due to occular implant  - EEG w/o seizure like activity  - Neuro checks  - Continue ASA,Statin, Brillinta  - Neurology recs appreciated        #Acute metabolic encephelopathy   # dysphagia  - likely in setting of Vascular dementia with behavioral changes with recent FTT  -cth neg  -cw depakote 250mg q12hrs - check level on 12/28  - SLP following,cleared for pureed diet with moderately thick liquid.  - cw Dronabinol  -Hold Seroquel, Sertraline, Mirtazapine as mental status waxing and weaning and some somnolance  - EEG suggestive of focal and generalized slowing.no seizure   - family does not want any use of benzos or opioids, ok w low dose zyprex for agitation   -Risk of dehydration/electrolytes imbalance from poor po intake .  -per rn, pt is non coop. Very resistance to open mouth. hard to give meds and food. Able to give am meds.  -Monitor lytes      Elevated troponin likely demand ischemia  - appears in no distress  - Trop: 36 -> 40 -> 52 -> 55, continue to trend  - Repeat EKG  - Telemetry  - Troponin was elevated to the 50s on prior admission, he was seen by Cardiology and TTE was unremarkable. Patient was deemed not a candidate for stress testing or invasive procedure.     Bradycardia  -Hold metoprolol  -EKG shows sinus bradycardia    Advance Directives: Full code, Palliative following    DVT ppx: Heparin SQ  Diet: pureed diet  Dispo:  will need placement to [facility] (family still choosing place-still pending ) - patient medically stable   High risk of electrolytes imbalance from poor po intake. Family understood risk.   for safe discharge

## 2024-12-30 LAB
ANION GAP SERPL CALC-SCNC: 10 MMOL/L — SIGNIFICANT CHANGE UP (ref 5–17)
BUN SERPL-MCNC: 12.7 MG/DL — SIGNIFICANT CHANGE UP (ref 8–20)
CALCIUM SERPL-MCNC: 9.3 MG/DL — SIGNIFICANT CHANGE UP (ref 8.4–10.5)
CHLORIDE SERPL-SCNC: 104 MMOL/L — SIGNIFICANT CHANGE UP (ref 96–108)
CO2 SERPL-SCNC: 28 MMOL/L — SIGNIFICANT CHANGE UP (ref 22–29)
CREAT SERPL-MCNC: 0.55 MG/DL — SIGNIFICANT CHANGE UP (ref 0.5–1.3)
EGFR: 108 ML/MIN/1.73M2 — SIGNIFICANT CHANGE UP
GLUCOSE SERPL-MCNC: 83 MG/DL — SIGNIFICANT CHANGE UP (ref 70–99)
POTASSIUM SERPL-MCNC: 4.2 MMOL/L — SIGNIFICANT CHANGE UP (ref 3.5–5.3)
POTASSIUM SERPL-SCNC: 4.2 MMOL/L — SIGNIFICANT CHANGE UP (ref 3.5–5.3)
SODIUM SERPL-SCNC: 142 MMOL/L — SIGNIFICANT CHANGE UP (ref 135–145)

## 2024-12-30 PROCEDURE — 99232 SBSQ HOSP IP/OBS MODERATE 35: CPT

## 2024-12-30 RX ADMIN — TICAGRELOR 90 MILLIGRAM(S): 60 TABLET ORAL at 05:59

## 2024-12-30 RX ADMIN — CHLORHEXIDINE GLUCONATE 1 APPLICATION(S): 1.2 RINSE ORAL at 05:58

## 2024-12-30 RX ADMIN — HEPARIN SODIUM 5000 UNIT(S): 1000 INJECTION, SOLUTION INTRAVENOUS; SUBCUTANEOUS at 05:58

## 2024-12-30 RX ADMIN — SIMETHICONE 80 MILLIGRAM(S): 125 CAPSULE, LIQUID FILLED ORAL at 10:29

## 2024-12-30 RX ADMIN — ACETAMINOPHEN 650 MILLIGRAM(S): 80 SOLUTION/ DROPS ORAL at 18:57

## 2024-12-30 RX ADMIN — Medication 81 MILLIGRAM(S): at 10:30

## 2024-12-30 RX ADMIN — ACETAMINOPHEN 650 MILLIGRAM(S): 80 SOLUTION/ DROPS ORAL at 05:59

## 2024-12-30 RX ADMIN — HEPARIN SODIUM 5000 UNIT(S): 1000 INJECTION, SOLUTION INTRAVENOUS; SUBCUTANEOUS at 17:58

## 2024-12-30 RX ADMIN — DRONABINOL 5 MILLIGRAM(S): 5 SOLUTION ORAL at 10:29

## 2024-12-30 RX ADMIN — DIVALPROEX SODIUM 250 MILLIGRAM(S): 500 TABLET, DELAYED RELEASE ORAL at 06:00

## 2024-12-30 RX ADMIN — ACETAMINOPHEN 650 MILLIGRAM(S): 80 SOLUTION/ DROPS ORAL at 06:59

## 2024-12-30 RX ADMIN — LOSARTAN POTASSIUM 25 MILLIGRAM(S): 100 TABLET, FILM COATED ORAL at 05:59

## 2024-12-30 RX ADMIN — TICAGRELOR 90 MILLIGRAM(S): 60 TABLET ORAL at 17:58

## 2024-12-30 RX ADMIN — DIVALPROEX SODIUM 250 MILLIGRAM(S): 500 TABLET, DELAYED RELEASE ORAL at 17:58

## 2024-12-30 RX ADMIN — ACETAMINOPHEN 650 MILLIGRAM(S): 80 SOLUTION/ DROPS ORAL at 19:27

## 2024-12-30 NOTE — PROGRESS NOTE ADULT - ASSESSMENT
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 from June 14th to July 17th, 2024) presents from [facility] after a mechanical fall. He had a recent admission was for mildly elevated troponin levels, failure to thrive, and altered mental status suspected to be due to worsening vascular dementia. He presented with reportedly developed new left-sided weakness, which has since improved. He has a right ocular implant (non-MR compatible). Head imaging demonstrates a possible enlarging right cerebellar subacute infarction compared to imaging from November. He was subsequently admitted to the Stroke service for rule out CVA. MRI was unable to be obtained due to ocular implant. Palliative was consulted for assistance with goals of care. SLP evaluated patient with recommendations for pureed diet. CTH was repeated today which showed "moderate to severe chronic microvascular changes without evidence of an acute transcortical infarction or hemorrhage." EEG without evidence of seizure like activity. Patient now being transferred from Neurology to Medicine. Patient with poor oral intake. Patients son aware of prognosis and want to ensure medical optimization before discharge to a new [facility] (they are not happy with previous [facility] care). From medical standpoint patient is ready for discharge to [facility].    #L sided weakness, hx of CVA w/ R sided weakness  - Repeat CTH without any new CVA  - Unable to obtain MRI due to occular implant  - EEG w/o seizure like activity  - Neuro checks  - Continue ASA,Statin, Brillinta  - Neurology recs appreciated        #Acute metabolic encephelopathy /agitation  # dysphagia  - likely in setting of Vascular dementia with behavioral changes with recent FTT  -cth neg  -cw depakote 250mg q12hrs - check level on 12/28  - SLP following,cleared for pureed diet with moderately thick liquid.  - cw Dronabinol  -Hold Seroquel, Sertraline, Mirtazapine as mental status waxing and weaning and some somnolance  - EEG suggestive of focal and generalized slowing.no seizure   - family does not want any use of benzos or opioids, ok w low dose zyprex for agitation   -Risk of dehydration/electrolytes imbalance from poor po intake .  -prn zyprexa for agitation.tachy for agitation  -Monitor lytes      Elevated troponin likely demand ischemia  - appears in no distress  - Trop: 36 -> 40 -> 52 -> 55, continue to trend  - Repeat EKG  - Telemetry  - Troponin was elevated to the 50s on prior admission, he was seen by Cardiology and TTE was unremarkable. Patient was deemed not a candidate for stress testing or invasive procedure.     Bradycardia  -Hold metoprolol  -EKG shows sinus bradycardia    Advance Directives: Full code, Palliative following    DVT ppx: Heparin SQ  Diet: pureed diet  Dispo:  will need placement to [facility] (family still choosing place-still pending )  High risk of electrolytes imbalance from poor po intake. Family understood risk.  SW for safe discharge

## 2024-12-30 NOTE — PROGRESS NOTE ADULT - SUBJECTIVE AND OBJECTIVE BOX
Patient is a 69y old  Male who presents with a chief complaint of Aphasia     (20 Dec 2024 13:50)      pt is awake. confuse. Trying to get up from bed  REVIEW OF SYSTEMS: Ams-unable    MEDICATIONS  (STANDING):  aspirin  chewable 81 milliGRAM(s) Oral daily  chlorhexidine 2% Cloths 1 Application(s) Topical <User Schedule>  divalproex Sprinkle 250 milliGRAM(s) Oral two times a day  dronabinol 5 milliGRAM(s) Oral daily  heparin   Injectable 5000 Unit(s) SubCutaneous every 12 hours  losartan 25 milliGRAM(s) Oral daily  simethicone 80 milliGRAM(s) Chew daily  ticagrelor 90 milliGRAM(s) Oral two times a day    MEDICATIONS  (PRN):  acetaminophen     Tablet .. 650 milliGRAM(s) Oral every 6 hours PRN Temp greater or equal to 38C (100.4F), Mild Pain (1 - 3)  hydrALAZINE Injectable 10 milliGRAM(s) IV Push every 6 hours PRN SBP >160  OLANZapine Injectable 2.5 milliGRAM(s) IntraMuscular every 6 hours PRN acute agitation, if needed, hold for sedation  ondansetron Injectable 4 milliGRAM(s) IV Push every 8 hours PRN Nausea and/or Vomiting      CAPILLARY BLOOD GLUCOSE        I&O's Summary    29 Dec 2024 07:01  -  30 Dec 2024 07:00  --------------------------------------------------------  IN: 834 mL / OUT: 0 mL / NET: 834 mL        PHYSICAL EXAM:  Vital Signs Last 24 Hrs  T(C): 36.5 (30 Dec 2024 13:34), Max: 36.8 (29 Dec 2024 21:00)  T(F): 97.7 (30 Dec 2024 13:34), Max: 98.2 (29 Dec 2024 21:00)  HR: 103 (30 Dec 2024 13:38) (68 - 150)  BP: 156/92 (30 Dec 2024 13:34) (114/76 - 156/92)  BP(mean): 88 (30 Dec 2024 10:44) (88 - 96)  RR: 18 (30 Dec 2024 13:34) (16 - 18)  SpO2: 98% (30 Dec 2024 13:34) (95% - 98%)    Parameters below as of 30 Dec 2024 13:34  Patient On (Oxygen Delivery Method): room air        CONSTITUTIONAL: NAD,  EYES: conjunctiva and sclera clear  ENMT:unable  RESPIRATORY: Normal respiratory effort; lungs are clear to auscultation bilaterally  CARDIOVASCULAR: Regular rate and rhythm, normal S1 and S2, no murmur   EXTS: No lower extremity edema; Peripheral pulses are 2+ bilaterally  ABDOMEN: Nontender to palpation, normoactive bowel sounds, no rebound/guarding;   MUSCLOSKELETAL: no joint swelling or tenderness to palpation  PSYCH: restless  NEUROLOGY: Awake,confuse,moving all exts      LABS:    12-30    142  |  104  |  12.7  ----------------------------<  83  4.2   |  28.0  |  0.55    Ca    9.3      30 Dec 2024 04:58            Urinalysis Basic - ( 30 Dec 2024 04:58 )    Color: x / Appearance: x / SG: x / pH: x  Gluc: 83 mg/dL / Ketone: x  / Bili: x / Urobili: x   Blood: x / Protein: x / Nitrite: x   Leuk Esterase: x / RBC: x / WBC x   Sq Epi: x / Non Sq Epi: x / Bacteria: x          RADIOLOGY & ADDITIONAL TESTS:  Results Reviewed:

## 2024-12-31 VITALS
RESPIRATION RATE: 16 BRPM | HEART RATE: 82 BPM | OXYGEN SATURATION: 99 % | DIASTOLIC BLOOD PRESSURE: 85 MMHG | SYSTOLIC BLOOD PRESSURE: 148 MMHG | TEMPERATURE: 98 F

## 2024-12-31 LAB
ANION GAP SERPL CALC-SCNC: 10 MMOL/L — SIGNIFICANT CHANGE UP (ref 5–17)
BUN SERPL-MCNC: 12.2 MG/DL — SIGNIFICANT CHANGE UP (ref 8–20)
CALCIUM SERPL-MCNC: 8.9 MG/DL — SIGNIFICANT CHANGE UP (ref 8.4–10.5)
CHLORIDE SERPL-SCNC: 102 MMOL/L — SIGNIFICANT CHANGE UP (ref 96–108)
CO2 SERPL-SCNC: 26 MMOL/L — SIGNIFICANT CHANGE UP (ref 22–29)
CREAT SERPL-MCNC: 0.49 MG/DL — LOW (ref 0.5–1.3)
EGFR: 112 ML/MIN/1.73M2 — SIGNIFICANT CHANGE UP
GLUCOSE SERPL-MCNC: 93 MG/DL — SIGNIFICANT CHANGE UP (ref 70–99)
POTASSIUM SERPL-MCNC: 4.5 MMOL/L — SIGNIFICANT CHANGE UP (ref 3.5–5.3)
POTASSIUM SERPL-SCNC: 4.5 MMOL/L — SIGNIFICANT CHANGE UP (ref 3.5–5.3)
SODIUM SERPL-SCNC: 138 MMOL/L — SIGNIFICANT CHANGE UP (ref 135–145)

## 2024-12-31 PROCEDURE — 86900 BLOOD TYPING SEROLOGIC ABO: CPT

## 2024-12-31 PROCEDURE — 84484 ASSAY OF TROPONIN QUANT: CPT

## 2024-12-31 PROCEDURE — 80048 BASIC METABOLIC PNL TOTAL CA: CPT

## 2024-12-31 PROCEDURE — 85027 COMPLETE CBC AUTOMATED: CPT

## 2024-12-31 PROCEDURE — 83036 HEMOGLOBIN GLYCOSYLATED A1C: CPT

## 2024-12-31 PROCEDURE — 86850 RBC ANTIBODY SCREEN: CPT

## 2024-12-31 PROCEDURE — 87641 MR-STAPH DNA AMP PROBE: CPT

## 2024-12-31 PROCEDURE — 99239 HOSP IP/OBS DSCHRG MGMT >30: CPT

## 2024-12-31 PROCEDURE — 86901 BLOOD TYPING SEROLOGIC RH(D): CPT

## 2024-12-31 PROCEDURE — 87640 STAPH A DNA AMP PROBE: CPT

## 2024-12-31 PROCEDURE — 80307 DRUG TEST PRSMV CHEM ANLYZR: CPT

## 2024-12-31 PROCEDURE — 82962 GLUCOSE BLOOD TEST: CPT

## 2024-12-31 PROCEDURE — 70450 CT HEAD/BRAIN W/O DYE: CPT | Mod: MC

## 2024-12-31 PROCEDURE — 95819 EEG AWAKE AND ASLEEP: CPT

## 2024-12-31 PROCEDURE — 80061 LIPID PANEL: CPT

## 2024-12-31 PROCEDURE — 83735 ASSAY OF MAGNESIUM: CPT

## 2024-12-31 PROCEDURE — 70496 CT ANGIOGRAPHY HEAD: CPT | Mod: MC

## 2024-12-31 PROCEDURE — 73130 X-RAY EXAM OF HAND: CPT

## 2024-12-31 PROCEDURE — 85610 PROTHROMBIN TIME: CPT

## 2024-12-31 PROCEDURE — 0042T: CPT | Mod: MC

## 2024-12-31 PROCEDURE — 93005 ELECTROCARDIOGRAM TRACING: CPT

## 2024-12-31 PROCEDURE — 71045 X-RAY EXAM CHEST 1 VIEW: CPT

## 2024-12-31 PROCEDURE — 84100 ASSAY OF PHOSPHORUS: CPT

## 2024-12-31 PROCEDURE — 85025 COMPLETE CBC W/AUTO DIFF WBC: CPT

## 2024-12-31 PROCEDURE — 99285 EMERGENCY DEPT VISIT HI MDM: CPT

## 2024-12-31 PROCEDURE — 97530 THERAPEUTIC ACTIVITIES: CPT

## 2024-12-31 PROCEDURE — 85730 THROMBOPLASTIN TIME PARTIAL: CPT

## 2024-12-31 PROCEDURE — 82803 BLOOD GASES ANY COMBINATION: CPT

## 2024-12-31 PROCEDURE — 92526 ORAL FUNCTION THERAPY: CPT

## 2024-12-31 PROCEDURE — 93970 EXTREMITY STUDY: CPT

## 2024-12-31 PROCEDURE — 36415 COLL VENOUS BLD VENIPUNCTURE: CPT

## 2024-12-31 PROCEDURE — 70498 CT ANGIOGRAPHY NECK: CPT | Mod: MC

## 2024-12-31 PROCEDURE — 80164 ASSAY DIPROPYLACETIC ACD TOT: CPT

## 2024-12-31 PROCEDURE — 80053 COMPREHEN METABOLIC PANEL: CPT

## 2024-12-31 RX ORDER — PANTOPRAZOLE SODIUM 40 MG/1
1 TABLET, DELAYED RELEASE ORAL
Refills: 0 | DISCHARGE

## 2024-12-31 RX ORDER — LOSARTAN POTASSIUM 100 MG/1
1 TABLET, FILM COATED ORAL
Refills: 0 | DISCHARGE

## 2024-12-31 RX ORDER — DIVALPROEX SODIUM 500 MG/1
2 TABLET, DELAYED RELEASE ORAL
Qty: 0 | Refills: 0 | DISCHARGE
Start: 2024-12-31

## 2024-12-31 RX ORDER — HEPARIN SODIUM 1000 [USP'U]/ML
5000 INJECTION, SOLUTION INTRAVENOUS; SUBCUTANEOUS
Qty: 6 | Refills: 0
Start: 2024-12-31 | End: 2025-01-02

## 2024-12-31 RX ORDER — FAMOTIDINE 20 MG/1
1 TABLET, FILM COATED ORAL
Refills: 0 | DISCHARGE

## 2024-12-31 RX ORDER — TICAGRELOR 60 MG/1
1 TABLET ORAL
Qty: 0 | Refills: 0 | DISCHARGE
Start: 2024-12-31

## 2024-12-31 RX ORDER — DRONABINOL 5 MG/ML
1 SOLUTION ORAL
Qty: 0 | Refills: 0 | DISCHARGE
Start: 2024-12-31

## 2024-12-31 RX ORDER — ACETAMINOPHEN 80 MG/.8ML
2 SOLUTION/ DROPS ORAL
Qty: 0 | Refills: 0 | DISCHARGE
Start: 2024-12-31

## 2024-12-31 RX ORDER — ATORVASTATIN CALCIUM 40 MG/1
1 TABLET, FILM COATED ORAL
Qty: 30 | Refills: 0
Start: 2024-12-31 | End: 2025-01-29

## 2024-12-31 RX ORDER — LOSARTAN POTASSIUM 100 MG/1
1 TABLET, FILM COATED ORAL
Qty: 0 | Refills: 0 | DISCHARGE
Start: 2024-12-31

## 2024-12-31 RX ORDER — OLANZAPINE 15 MG/1
1 TABLET ORAL
Qty: 8 | Refills: 0
Start: 2024-12-31 | End: 2025-01-03

## 2024-12-31 RX ORDER — BACLOFEN 100 %
1 POWDER (GRAM) MISCELLANEOUS
Refills: 0 | DISCHARGE

## 2024-12-31 RX ORDER — ASPIRIN 81 MG
1 TABLET, DELAYED RELEASE (ENTERIC COATED) ORAL
Qty: 0 | Refills: 0 | DISCHARGE
Start: 2024-12-31

## 2024-12-31 RX ORDER — SIMETHICONE 125 MG/1
1 CAPSULE, LIQUID FILLED ORAL
Qty: 0 | Refills: 0 | DISCHARGE
Start: 2024-12-31

## 2024-12-31 RX ORDER — TICAGRELOR 90 MG/1
1 TABLET ORAL
Refills: 0 | DISCHARGE

## 2024-12-31 RX ADMIN — SIMETHICONE 80 MILLIGRAM(S): 125 CAPSULE, LIQUID FILLED ORAL at 13:39

## 2024-12-31 RX ADMIN — Medication 81 MILLIGRAM(S): at 13:39

## 2024-12-31 RX ADMIN — ACETAMINOPHEN 650 MILLIGRAM(S): 80 SOLUTION/ DROPS ORAL at 07:23

## 2024-12-31 RX ADMIN — HEPARIN SODIUM 5000 UNIT(S): 1000 INJECTION, SOLUTION INTRAVENOUS; SUBCUTANEOUS at 06:23

## 2024-12-31 RX ADMIN — TICAGRELOR 90 MILLIGRAM(S): 60 TABLET ORAL at 06:24

## 2024-12-31 RX ADMIN — DIVALPROEX SODIUM 250 MILLIGRAM(S): 500 TABLET, DELAYED RELEASE ORAL at 06:24

## 2024-12-31 RX ADMIN — DRONABINOL 5 MILLIGRAM(S): 5 SOLUTION ORAL at 13:38

## 2024-12-31 RX ADMIN — LOSARTAN POTASSIUM 25 MILLIGRAM(S): 100 TABLET, FILM COATED ORAL at 06:23

## 2024-12-31 RX ADMIN — ACETAMINOPHEN 650 MILLIGRAM(S): 80 SOLUTION/ DROPS ORAL at 06:23

## 2024-12-31 RX ADMIN — CHLORHEXIDINE GLUCONATE 1 APPLICATION(S): 1.2 RINSE ORAL at 06:24

## 2024-12-31 NOTE — PROGRESS NOTE ADULT - ASSESSMENT
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 He had a recent admission was for mildly elevated troponin levels, failure to thrive, and altered mental status suspected to be due to worsening vascular dementia. He presented with reportedly developed new left-sided weakness, which has since improved. He has a right ocular implant (non-MR compatible). Head imaging demonstrates a possible enlarging right cerebellar subacute infarction compared to imaging from November. He was subsequently admitted to the Stroke service for rule out CVA. MRI was unable to be obtained due to ocular implant. Palliative was consulted for assistance with goals of care. SLP evaluated patient with recommendations for pureed diet. CTH was repeated today which showed "moderate to severe chronic microvascular changes without evidence of an acute transcortical infarction or hemorrhage." EEG without evidence of seizure like activity. Patient now being transferred from Neurology to Medicine. Patient with poor oral intake. Patients son aware of prognosis and want to ensure medical optimization before discharge to a new [facility] (they are not happy with previous [facility] care). From medical standpoint patient is ready for discharge to [facility].    #L sided weakness, hx of CVA w/ R sided weakness  - Repeat CTH without any new CVA  - Unable to obtain MRI due to occular implant  - EEG w/o seizure like activity  - Neuro checks  - Continue ASA,Statin, Brillinta  - Neurology recs appreciated        #Acute metabolic encephelopathy /agitation  # dysphagia  - likely in setting of Vascular dementia with behavioral changes with recent FTT  -cth neg  -cw depakote 250mg q12hrs - check level on 12/28  - SLP following,cleared for pureed diet with moderately thick liquid.  - cw Dronabinol  -Hold Seroquel, Sertraline, Mirtazapine as mental status waxing and weaning and some somnolance  - EEG suggestive of focal and generalized slowing.no seizure   - family does not want any use of benzos or opioids, ok w low dose zyprex for agitation   -Risk of dehydration/electrolytes imbalance from poor po intake .  -prn zyprexa for agitation.tachy for agitation  -Monitor lytes      Elevated troponin likely demand ischemia  - appears in no distress  - Trop: 36 -> 40 -> 52 -> 55, continue to trend  - Repeat EKG  - Telemetry  - Troponin was elevated to the 50s on prior admission, he was seen by Cardiology and TTE was unremarkable. Patient was deemed not a candidate for stress testing or invasive procedure.     Bradycardia  -Hold metoprolol  -EKG shows sinus bradycardia    Advance Directives: Full code, Palliative following    DVT ppx: Heparin SQ  Diet: pureed diet  Dispo:  [facility] placement (family is requesting RUE doopler)  High risk of electrolytes imbalance from poor po intake. Family understood risk.    Pt did not coop for doppler-unable to perform the test.  Spoke with Son Dannie.He does not want pt to be discharge w/o doppler.He wants me to talk to Odell Nash(son),cleed him..unable to reach-no voice mail.  SW for safe discharge

## 2024-12-31 NOTE — CHART NOTE - NSCHARTNOTEFT_GEN_A_CORE
Pt refused doppler-Pt was send down but unable to do the doopler.  DW Son Odell- understood risk of TE as pt has very limited activites, high risk of dehydration renal failure, electrolytes imbalance from poor po intake. son understood the risk.pt can have dopller RUE at [facility] if he coop. Son wants to discharge the pt to [facility].

## 2024-12-31 NOTE — PROGRESS NOTE ADULT - NUTRITIONAL ASSESSMENT
This patient has been assessed with a concern for Malnutrition and has been determined to have a diagnosis/diagnoses of Moderate protein-calorie malnutrition.    This patient is being managed with:   Diet Pureed-  Moderately Thick Liquids (MODTHICKLIQS)  Supplement Feeding Modality:  Oral  Ensure Pudding Cans or Servings Per Day:  3       Frequency:  Three Times a day  Entered: Dec 20 2024  3:38PM  
This patient has been assessed with a concern for Malnutrition and has been determined to have a diagnosis/diagnoses of Moderate protein-calorie malnutrition.    This patient is being managed with:   Diet Pureed-  Moderately Thick Liquids (MODTHICKLIQS)  Supplement Feeding Modality:  Oral  Ensure Enlive Cans or Servings Per Day:  1       Frequency:  Three Times a day  Entered: Dec 27 2024 11:54AM  
This patient has been assessed with a concern for Malnutrition and has been determined to have a diagnosis/diagnoses of Moderate protein-calorie malnutrition.    This patient is being managed with:   Diet Pureed-  Moderately Thick Liquids (MODTHICKLIQS)  Supplement Feeding Modality:  Oral  Ensure Enlive Cans or Servings Per Day:  1       Frequency:  Three Times a day  Entered: Dec 27 2024 11:54AM  
This patient has been assessed with a concern for Malnutrition and has been determined to have a diagnosis/diagnoses of Moderate protein-calorie malnutrition.    This patient is being managed with:   Diet Pureed-  Moderately Thick Liquids (MODTHICKLIQS)  Entered: Dec 20 2024 10:20AM  
This patient has been assessed with a concern for Malnutrition and has been determined to have a diagnosis/diagnoses of Moderate protein-calorie malnutrition.    This patient is being managed with:   Diet Pureed-  Moderately Thick Liquids (MODTHICKLIQS)  Supplement Feeding Modality:  Oral  Ensure Enlive Cans or Servings Per Day:  1       Frequency:  Three Times a day  Entered: Dec 27 2024 11:54AM  
This patient has been assessed with a concern for Malnutrition and has been determined to have a diagnosis/diagnoses of Moderate protein-calorie malnutrition.    This patient is being managed with:   Diet Pureed-  Moderately Thick Liquids (MODTHICKLIQS)  Supplement Feeding Modality:  Oral  Ensure Enlive Cans or Servings Per Day:  1       Frequency:  Three Times a day  Entered: Dec 27 2024 11:54AM  
This patient has been assessed with a concern for Malnutrition and has been determined to have a diagnosis/diagnoses of Moderate protein-calorie malnutrition.    This patient is being managed with:   Diet Pureed-  Moderately Thick Liquids (MODTHICKLIQS)  Supplement Feeding Modality:  Oral  Ensure Pudding Cans or Servings Per Day:  3       Frequency:  Three Times a day  Entered: Dec 20 2024  3:38PM  

## 2024-12-31 NOTE — CHART NOTE - NSCHARTNOTESELECT_GEN_ALL_CORE
Event Note
Nutrition Services
significant event
Event Note

## 2024-12-31 NOTE — PROGRESS NOTE ADULT - SUBJECTIVE AND OBJECTIVE BOX
Patient is a 69y old  Male who presents with a chief complaint of Aphasia     (20 Dec 2024 13:50)      Pt is awake ,confuse  REVIEW OF SYSTEMS: AMS-unable    MEDICATIONS  (STANDING):  aspirin  chewable 81 milliGRAM(s) Oral daily  chlorhexidine 2% Cloths 1 Application(s) Topical <User Schedule>  divalproex Sprinkle 250 milliGRAM(s) Oral two times a day  dronabinol 5 milliGRAM(s) Oral daily  heparin   Injectable 5000 Unit(s) SubCutaneous every 12 hours  losartan 25 milliGRAM(s) Oral daily  simethicone 80 milliGRAM(s) Chew daily  ticagrelor 90 milliGRAM(s) Oral two times a day    MEDICATIONS  (PRN):  acetaminophen     Tablet .. 650 milliGRAM(s) Oral every 6 hours PRN Temp greater or equal to 38C (100.4F), Mild Pain (1 - 3)  hydrALAZINE Injectable 10 milliGRAM(s) IV Push every 6 hours PRN SBP >160  OLANZapine Injectable 2.5 milliGRAM(s) IntraMuscular every 6 hours PRN acute agitation, if needed, hold for sedation  ondansetron Injectable 4 milliGRAM(s) IV Push every 8 hours PRN Nausea and/or Vomiting      CAPILLARY BLOOD GLUCOSE        I&O's Summary    30 Dec 2024 07:01  -  31 Dec 2024 07:00  --------------------------------------------------------  IN: 650 mL / OUT: 0 mL / NET: 650 mL        PHYSICAL EXAM:  Vital Signs Last 24 Hrs  T(C): 36.7 (31 Dec 2024 11:29), Max: 36.7 (30 Dec 2024 22:13)  T(F): 98 (31 Dec 2024 11:29), Max: 98.1 (31 Dec 2024 08:02)  HR: 91 (31 Dec 2024 11:29) (80 - 150)  BP: 143/76 (31 Dec 2024 11:29) (127/80 - 162/96)  BP(mean): 98 (31 Dec 2024 11:29) (98 - 98)  RR: 16 (31 Dec 2024 11:29) (16 - 18)  SpO2: 95% (31 Dec 2024 11:29) (95% - 98%)    Parameters below as of 31 Dec 2024 11:29  Patient On (Oxygen Delivery Method): room air        CONSTITUTIONAL: NAD,  EYES:  conjunctiva and sclera clear  ENMT: unable  RESPIRATORY: Normal respiratory effort; lungs are clear to auscultation bilaterally  CARDIOVASCULAR: Regular rate and rhythm, normal S1 and S2, no murmur   EXTS: No lower extremity edema; Peripheral pulses are 2+ bilaterally  ABDOMEN: Nontender to palpation, normoactive bowel sounds, no rebound/guarding;   MUSCLOSKELETAL:   no joint swelling or tenderness to palpation. RUE: no erythema, tender  PSYCH: non coop  NEUROLOGY: Awake ,resistance to exam moving exts.      LABS:    12-31    138  |  102  |  12.2  ----------------------------<  93  4.5   |  26.0  |  0.49[L]    Ca    8.9      31 Dec 2024 06:57            Urinalysis Basic - ( 31 Dec 2024 06:57 )    Color: x / Appearance: x / SG: x / pH: x  Gluc: 93 mg/dL / Ketone: x  / Bili: x / Urobili: x   Blood: x / Protein: x / Nitrite: x   Leuk Esterase: x / RBC: x / WBC x   Sq Epi: x / Non Sq Epi: x / Bacteria: x          RADIOLOGY & ADDITIONAL TESTS:  Results Reviewed:      difficulty breathing

## 2025-01-01 ENCOUNTER — EMERGENCY (EMERGENCY)
Facility: HOSPITAL | Age: 70
LOS: 1 days | Discharge: HOME | End: 2025-01-01
Attending: EMERGENCY MEDICINE
Payer: SELF-PAY

## 2025-01-01 VITALS
RESPIRATION RATE: 17 BRPM | DIASTOLIC BLOOD PRESSURE: 68 MMHG | HEART RATE: 79 BPM | OXYGEN SATURATION: 97 % | SYSTOLIC BLOOD PRESSURE: 121 MMHG | TEMPERATURE: 98 F

## 2025-01-01 VITALS
TEMPERATURE: 98 F | RESPIRATION RATE: 16 BRPM | SYSTOLIC BLOOD PRESSURE: 147 MMHG | DIASTOLIC BLOOD PRESSURE: 82 MMHG | WEIGHT: 169.98 LBS | HEART RATE: 88 BPM | OXYGEN SATURATION: 99 % | HEIGHT: 70 IN

## 2025-01-01 DIAGNOSIS — Z98.890 OTHER SPECIFIED POSTPROCEDURAL STATES: Chronic | ICD-10-CM

## 2025-01-01 LAB
ALBUMIN SERPL ELPH-MCNC: 3.7 G/DL — SIGNIFICANT CHANGE UP (ref 3.3–5.2)
ALP SERPL-CCNC: 84 U/L — SIGNIFICANT CHANGE UP (ref 40–120)
ALT FLD-CCNC: 16 U/L — SIGNIFICANT CHANGE UP
AMORPH CRY # UR COMP ASSIST: PRESENT
ANION GAP SERPL CALC-SCNC: 11 MMOL/L — SIGNIFICANT CHANGE UP (ref 5–17)
APPEARANCE UR: ABNORMAL
AST SERPL-CCNC: 27 U/L — SIGNIFICANT CHANGE UP
BACTERIA # UR AUTO: ABNORMAL /HPF
BASOPHILS # BLD AUTO: 0.05 K/UL — SIGNIFICANT CHANGE UP (ref 0–0.2)
BASOPHILS NFR BLD AUTO: 0.9 % — SIGNIFICANT CHANGE UP (ref 0–2)
BILIRUB SERPL-MCNC: 0.4 MG/DL — SIGNIFICANT CHANGE UP (ref 0.4–2)
BILIRUB UR-MCNC: NEGATIVE — SIGNIFICANT CHANGE UP
BUN SERPL-MCNC: 19.4 MG/DL — SIGNIFICANT CHANGE UP (ref 8–20)
CALCIUM SERPL-MCNC: 9.6 MG/DL — SIGNIFICANT CHANGE UP (ref 8.4–10.5)
CAST: 1 /LPF — SIGNIFICANT CHANGE UP (ref 0–4)
CHLORIDE SERPL-SCNC: 104 MMOL/L — SIGNIFICANT CHANGE UP (ref 96–108)
CO2 SERPL-SCNC: 26 MMOL/L — SIGNIFICANT CHANGE UP (ref 22–29)
COLOR SPEC: YELLOW — SIGNIFICANT CHANGE UP
CREAT SERPL-MCNC: 0.54 MG/DL — SIGNIFICANT CHANGE UP (ref 0.5–1.3)
DIFF PNL FLD: NEGATIVE — SIGNIFICANT CHANGE UP
EGFR: 109 ML/MIN/1.73M2 — SIGNIFICANT CHANGE UP
EOSINOPHIL # BLD AUTO: 0.02 K/UL — SIGNIFICANT CHANGE UP (ref 0–0.5)
EOSINOPHIL NFR BLD AUTO: 0.4 % — SIGNIFICANT CHANGE UP (ref 0–6)
GLUCOSE SERPL-MCNC: 88 MG/DL — SIGNIFICANT CHANGE UP (ref 70–99)
GLUCOSE UR QL: NEGATIVE MG/DL — SIGNIFICANT CHANGE UP
HCT VFR BLD CALC: 36.6 % — LOW (ref 39–50)
HGB BLD-MCNC: 12 G/DL — LOW (ref 13–17)
IMM GRANULOCYTES NFR BLD AUTO: 0.4 % — SIGNIFICANT CHANGE UP (ref 0–0.9)
KETONES UR-MCNC: 15 MG/DL
LEUKOCYTE ESTERASE UR-ACNC: NEGATIVE — SIGNIFICANT CHANGE UP
LYMPHOCYTES # BLD AUTO: 1.81 K/UL — SIGNIFICANT CHANGE UP (ref 1–3.3)
LYMPHOCYTES # BLD AUTO: 33.6 % — SIGNIFICANT CHANGE UP (ref 13–44)
MCHC RBC-ENTMCNC: 29.9 PG — SIGNIFICANT CHANGE UP (ref 27–34)
MCHC RBC-ENTMCNC: 32.8 G/DL — SIGNIFICANT CHANGE UP (ref 32–36)
MCV RBC AUTO: 91 FL — SIGNIFICANT CHANGE UP (ref 80–100)
MONOCYTES # BLD AUTO: 0.64 K/UL — SIGNIFICANT CHANGE UP (ref 0–0.9)
MONOCYTES NFR BLD AUTO: 11.9 % — SIGNIFICANT CHANGE UP (ref 2–14)
NEUTROPHILS # BLD AUTO: 2.84 K/UL — SIGNIFICANT CHANGE UP (ref 1.8–7.4)
NEUTROPHILS NFR BLD AUTO: 52.8 % — SIGNIFICANT CHANGE UP (ref 43–77)
NITRITE UR-MCNC: NEGATIVE — SIGNIFICANT CHANGE UP
PH UR: 7.5 — SIGNIFICANT CHANGE UP (ref 5–8)
PLATELET # BLD AUTO: 304 K/UL — SIGNIFICANT CHANGE UP (ref 150–400)
POTASSIUM SERPL-MCNC: 4 MMOL/L — SIGNIFICANT CHANGE UP (ref 3.5–5.3)
POTASSIUM SERPL-MCNC: 6.3 MMOL/L — CRITICAL HIGH (ref 3.5–5.3)
POTASSIUM SERPL-SCNC: 4 MMOL/L — SIGNIFICANT CHANGE UP (ref 3.5–5.3)
POTASSIUM SERPL-SCNC: 6.3 MMOL/L — CRITICAL HIGH (ref 3.5–5.3)
PROT SERPL-MCNC: 6.3 G/DL — LOW (ref 6.6–8.7)
PROT UR-MCNC: 30 MG/DL
RBC # BLD: 4.02 M/UL — LOW (ref 4.2–5.8)
RBC # FLD: 15.3 % — HIGH (ref 10.3–14.5)
RBC CASTS # UR COMP ASSIST: 2 /HPF — SIGNIFICANT CHANGE UP (ref 0–4)
SODIUM SERPL-SCNC: 141 MMOL/L — SIGNIFICANT CHANGE UP (ref 135–145)
SP GR SPEC: 1.02 — SIGNIFICANT CHANGE UP (ref 1–1.03)
SQUAMOUS # UR AUTO: 1 /HPF — SIGNIFICANT CHANGE UP (ref 0–5)
UROBILINOGEN FLD QL: 1 MG/DL — SIGNIFICANT CHANGE UP (ref 0.2–1)
VALPROATE SERPL-MCNC: 22.2 UG/ML — LOW (ref 50–100)
WBC # BLD: 5.38 K/UL — SIGNIFICANT CHANGE UP (ref 3.8–10.5)
WBC # FLD AUTO: 5.38 K/UL — SIGNIFICANT CHANGE UP (ref 3.8–10.5)
WBC UR QL: 5 /HPF — SIGNIFICANT CHANGE UP (ref 0–5)

## 2025-01-01 PROCEDURE — 85025 COMPLETE CBC W/AUTO DIFF WBC: CPT

## 2025-01-01 PROCEDURE — 96360 HYDRATION IV INFUSION INIT: CPT

## 2025-01-01 PROCEDURE — 70450 CT HEAD/BRAIN W/O DYE: CPT | Mod: MC

## 2025-01-01 PROCEDURE — 70450 CT HEAD/BRAIN W/O DYE: CPT | Mod: 26

## 2025-01-01 PROCEDURE — 80053 COMPREHEN METABOLIC PANEL: CPT

## 2025-01-01 PROCEDURE — 93005 ELECTROCARDIOGRAM TRACING: CPT

## 2025-01-01 PROCEDURE — 72125 CT NECK SPINE W/O DYE: CPT | Mod: MC

## 2025-01-01 PROCEDURE — 80164 ASSAY DIPROPYLACETIC ACD TOT: CPT

## 2025-01-01 PROCEDURE — 72125 CT NECK SPINE W/O DYE: CPT | Mod: 26

## 2025-01-01 PROCEDURE — 81001 URINALYSIS AUTO W/SCOPE: CPT

## 2025-01-01 PROCEDURE — 99284 EMERGENCY DEPT VISIT MOD MDM: CPT

## 2025-01-01 PROCEDURE — 84132 ASSAY OF SERUM POTASSIUM: CPT

## 2025-01-01 PROCEDURE — 99285 EMERGENCY DEPT VISIT HI MDM: CPT | Mod: 25

## 2025-01-01 PROCEDURE — 73030 X-RAY EXAM OF SHOULDER: CPT

## 2025-01-01 PROCEDURE — 93010 ELECTROCARDIOGRAM REPORT: CPT

## 2025-01-01 PROCEDURE — 73030 X-RAY EXAM OF SHOULDER: CPT | Mod: 26,LT

## 2025-01-01 PROCEDURE — 36415 COLL VENOUS BLD VENIPUNCTURE: CPT

## 2025-01-01 RX ORDER — SODIUM CHLORIDE 9 MG/ML
1000 INJECTION, SOLUTION INTRAVENOUS ONCE
Refills: 0 | Status: COMPLETED | OUTPATIENT
Start: 2025-01-01 | End: 2025-01-01

## 2025-01-01 RX ADMIN — SODIUM CHLORIDE 1000 MILLILITER(S): 9 INJECTION, SOLUTION INTRAVENOUS at 19:41

## 2025-01-01 RX ADMIN — SODIUM CHLORIDE 1000 MILLILITER(S): 9 INJECTION, SOLUTION INTRAVENOUS at 19:00

## 2025-01-01 RX ADMIN — SODIUM CHLORIDE 1000 MILLILITER(S): 9 INJECTION, SOLUTION INTRAVENOUS at 17:43

## 2025-01-01 NOTE — ED ADULT NURSE NOTE - OBJECTIVE STATEMENT
70 YO male brought in by ambulance from [facility], A&Ox2 disoriented to time and situation. s/p multiple falls this morning. patient c/o pain in left shoulder. Patient denies any other complaints. Patient minimally verbal. 70 YO male brought in by ambulance from [facility], A&Ox1 disoriented to place,  time and situation. s/p multiple falls this morning. patient c/o pain in left shoulder. Patient denies any other complaints. Patient minimally verbal,

## 2025-01-01 NOTE — ED ADULT TRIAGE NOTE - BMI (KG/M2)
[facility] Initial Evaluation  Subjective:                                       Pertinent medical history includes:  Anemia, asthma, high blood pressure and menopausal (Herniated disc L4-L5).      Current medications:  High blood pressure medication (Lipitor).    Employment status: Lab Science Tech.  Primary job tasks include:  Prolonged standing and repetitive tasks (Computer work).                                Objective:  System    Physical Exam    General     ROS    Assessment/Plan:           24.4

## 2025-01-01 NOTE — ED ADULT NURSE NOTE - NSFALLRISKINTERV_ED_ALL_ED
Assistance OOB with selected safe patient handling equipment if applicable/Communicate fall risk and risk factors to all staff, patient, and family/Monitor gait and stability/Monitor for mental status changes and reorient to person, place, and time, as needed/Move patient closer to nursing station/within visual sight of ED staff/Provide patient with walking aids/Provide visual cue: yellow wristband, yellow gown, etc/Reinforce activity limits and safety measures with patient and family/Toileting schedule using arm’s reach rule for commode and bathroom/Use of alarms - bed, stretcher, chair and/or video monitoring/Call bell, personal items and telephone in reach/Instruct patient to call for assistance before getting out of bed/chair/stretcher/Non-slip footwear applied when patient is off stretcher/[place] to call system/Physically safe environment - no spills, clutter or unnecessary equipment/Purposeful Proactive Rounding/Room/bathroom lighting operational, light cord in reach

## 2025-01-01 NOTE — ED PROVIDER NOTE - PATIENT PORTAL LINK FT
You can access the FollowMyHealth Patient Portal offered by [facility] by registering at the following website: [email or web address]. By joining [employer]’s FollowMyHealth portal, you will also be able to view your health information using other applications (apps) compatible with our system.

## 2025-01-01 NOTE — ED ADULT NURSE REASSESSMENT NOTE - NS ED NURSE REASSESS COMMENT FT1
Patient A&Ox2 disoriented to time and situation placed constantin yellow gown, blood work sent, safety sitter at bedside. Awaiting lab results

## 2025-01-01 NOTE — ED PROVIDER NOTE - CLINICAL SUMMARY MEDICAL DECISION MAKING FREE TEXT BOX
Patient without acute traumatic injury.  CT negative as well as x-ray.  No UTI.  Labs reviewed.  I thoroughly reviewed the chart of the last admission with the descriptions of the patient's behavior and mental status, and had extensive discussion with the patient's son Kisha.  It appears that the patient's disorientation, confusion, mild intermittent agitation grabbing at the handrails is how he has been  behaving throughout his prior stay here as well as upon discharge to the [facility].  He appears at the same baseline mental status he has been for the last several weeks.  He is nontoxic-appearing.  Son is agreeable with discharge back to the subacute rehab facility and Leanna nursing supervisor at the facility agrees with taking patient back

## 2025-01-01 NOTE — ED PROVIDER NOTE - CARE PLAN
1 Principal Discharge DX:	Closed head injury  Secondary Diagnosis:	Fall  Secondary Diagnosis:	Vascular dementia

## 2025-01-01 NOTE — ED ADULT NURSE REASSESSMENT NOTE - NS ED NURSE REASSESS COMMENT FT1
Patient A&Ox1 resting in bed, nodding to questions, biting on the side rails and intermittently tries to hang out of bed. safety sitter initiated at bedside, anti slip sock provided to the patient, safety measures in place, awaiting CT scan.

## 2025-01-01 NOTE — ED PROVIDER NOTE - OBJECTIVE STATEMENT
61-year-old male patient with history of vascular dementia and [id number] year old male with PMH vascular dementia, nonverbal at baseline with intermittent behavioral disturbances, CVa with residual R sided weakness and recent admission for new subacute cerebellar CVA presents with fall. pt had fall at [facility], unwitnessed, unknown if LOC. Pt unable to provide any elements of Hx  Spoke with Son, who reports that pt has waxing and waning mental status, however he is usually "aware of what is going on" and cooperative. Pt appears more confused from baseline. 61-year-old male patient with history of vascular dementia and nonverbal at baseline with intermittent behavioral disturbances, CVa with residual R sided weakness and recent admission for new subacute cerebellar CVA presents with fall. pt had fall at [facility], unwitnessed, unknown if LOC. Pt unable to provide any elements of Hx  Spoke with Son, who reports that pt has waxing and waning mental status, at time he is cooperative, but has episodes of agitation and severe confusion.

## 2025-01-01 NOTE — ED ADULT TRIAGE NOTE - CHIEF COMPLAINT QUOTE
Pt BIBA from [facility] for multiple falls this morning. Pt c/o pain to left shoulder.

## 2025-01-30 ENCOUNTER — APPOINTMENT (OUTPATIENT)
Dept: NEUROLOGY | Facility: CLINIC | Age: 70
End: 2025-01-30

## 2025-02-18 ENCOUNTER — APPOINTMENT (OUTPATIENT)
Dept: NEUROLOGY | Facility: CLINIC | Age: 70
End: 2025-02-18